# Patient Record
Sex: FEMALE | Race: WHITE | Employment: OTHER | URBAN - METROPOLITAN AREA
[De-identification: names, ages, dates, MRNs, and addresses within clinical notes are randomized per-mention and may not be internally consistent; named-entity substitution may affect disease eponyms.]

---

## 2017-03-30 ENCOUNTER — HOSPITAL ENCOUNTER (OUTPATIENT)
Dept: CT IMAGING | Age: 77
Discharge: HOME | End: 2017-03-30
Attending: INTERNAL MEDICINE
Payer: SELF-PAY

## 2017-03-30 DIAGNOSIS — Z13.6 ENCOUNTER FOR SCREENING FOR CARDIOVASCULAR DISORDERS: ICD-10-CM

## 2017-03-30 PROCEDURE — 75571 CT HRT W/O DYE W/CA TEST: CPT

## 2017-04-10 ENCOUNTER — TELEPHONE (OUTPATIENT)
Dept: CARDIAC REHAB | Age: 77
End: 2017-04-10

## 2017-04-10 NOTE — TELEPHONE ENCOUNTER
Called patient to review her CT scan results. Patient was unavailable. Left a voice mail message for patient to return my call. Return phone number given in message. Awaiting her return call.

## 2017-04-18 ENCOUNTER — TELEPHONE (OUTPATIENT)
Dept: CARDIAC REHAB | Age: 77
End: 2017-04-18

## 2017-06-26 ENCOUNTER — APPOINTMENT (OUTPATIENT)
Dept: GENERAL RADIOLOGY | Age: 77
End: 2017-06-26
Attending: EMERGENCY MEDICINE
Payer: MEDICARE

## 2017-06-26 ENCOUNTER — HOSPITAL ENCOUNTER (EMERGENCY)
Age: 77
Discharge: HOME | End: 2017-06-26
Attending: EMERGENCY MEDICINE
Payer: MEDICARE

## 2017-06-26 VITALS
OXYGEN SATURATION: 98 % | HEART RATE: 64 BPM | DIASTOLIC BLOOD PRESSURE: 78 MMHG | SYSTOLIC BLOOD PRESSURE: 141 MMHG | RESPIRATION RATE: 20 BRPM | HEIGHT: 61 IN | WEIGHT: 164 LBS | TEMPERATURE: 98.7 F | BODY MASS INDEX: 30.96 KG/M2

## 2017-06-26 DIAGNOSIS — R07.9 ACUTE CHEST PAIN: Primary | ICD-10-CM

## 2017-06-26 LAB
ALBUMIN SERPL BCP-MCNC: 3.6 G/DL (ref 3.4–5)
ALBUMIN/GLOB SERPL: 1.1 {RATIO} (ref 0.8–1.7)
ALP SERPL-CCNC: 81 U/L (ref 45–117)
ALT SERPL-CCNC: 21 U/L (ref 13–56)
ANION GAP BLD CALC-SCNC: 8 MMOL/L (ref 3–18)
APTT PPP: 35.3 SEC (ref 23–36.4)
AST SERPL W P-5'-P-CCNC: 18 U/L (ref 15–37)
BASOPHILS # BLD AUTO: 0 K/UL (ref 0–0.06)
BASOPHILS # BLD: 0 % (ref 0–2)
BILIRUB SERPL-MCNC: 0.4 MG/DL (ref 0.2–1)
BUN SERPL-MCNC: 15 MG/DL (ref 7–18)
BUN/CREAT SERPL: 13 (ref 12–20)
CALCIUM SERPL-MCNC: 8.7 MG/DL (ref 8.5–10.1)
CHLORIDE SERPL-SCNC: 103 MMOL/L (ref 100–108)
CK MB CFR SERPL CALC: NORMAL % (ref 0–4)
CK MB SERPL-MCNC: <1 NG/ML (ref 5–25)
CK SERPL-CCNC: 79 U/L (ref 26–192)
CO2 SERPL-SCNC: 26 MMOL/L (ref 21–32)
CREAT SERPL-MCNC: 1.13 MG/DL (ref 0.6–1.3)
D DIMER PPP FEU-MCNC: 0.57 UG/ML(FEU)
DIFFERENTIAL METHOD BLD: ABNORMAL
EOSINOPHIL # BLD: 0.1 K/UL (ref 0–0.4)
EOSINOPHIL NFR BLD: 2 % (ref 0–5)
ERYTHROCYTE [DISTWIDTH] IN BLOOD BY AUTOMATED COUNT: 14.5 % (ref 11.6–14.5)
GLOBULIN SER CALC-MCNC: 3.2 G/DL (ref 2–4)
GLUCOSE SERPL-MCNC: 179 MG/DL (ref 74–99)
HCT VFR BLD AUTO: 35.8 % (ref 35–45)
HGB BLD-MCNC: 11.4 G/DL (ref 12–16)
INR PPP: 1.1 (ref 0.8–1.2)
LYMPHOCYTES # BLD AUTO: 28 % (ref 21–52)
LYMPHOCYTES # BLD: 1.2 K/UL (ref 0.9–3.6)
MCH RBC QN AUTO: 29.3 PG (ref 24–34)
MCHC RBC AUTO-ENTMCNC: 31.8 G/DL (ref 31–37)
MCV RBC AUTO: 92 FL (ref 74–97)
MONOCYTES # BLD: 0.5 K/UL (ref 0.05–1.2)
MONOCYTES NFR BLD AUTO: 11 % (ref 3–10)
NEUTS SEG # BLD: 2.5 K/UL (ref 1.8–8)
NEUTS SEG NFR BLD AUTO: 59 % (ref 40–73)
PLATELET # BLD AUTO: 158 K/UL (ref 135–420)
PMV BLD AUTO: 9.8 FL (ref 9.2–11.8)
POTASSIUM SERPL-SCNC: 4.3 MMOL/L (ref 3.5–5.5)
PROT SERPL-MCNC: 6.8 G/DL (ref 6.4–8.2)
PROTHROMBIN TIME: 13.7 SEC (ref 11.5–15.2)
RBC # BLD AUTO: 3.89 M/UL (ref 4.2–5.3)
SODIUM SERPL-SCNC: 137 MMOL/L (ref 136–145)
TROPONIN I BLD-MCNC: <0.04 NG/ML (ref 0–0.08)
TROPONIN I SERPL-MCNC: <0.02 NG/ML (ref 0–0.04)
WBC # BLD AUTO: 4.3 K/UL (ref 4.6–13.2)

## 2017-06-26 PROCEDURE — 85379 FIBRIN DEGRADATION QUANT: CPT | Performed by: EMERGENCY MEDICINE

## 2017-06-26 PROCEDURE — 84484 ASSAY OF TROPONIN QUANT: CPT | Performed by: EMERGENCY MEDICINE

## 2017-06-26 PROCEDURE — 93005 ELECTROCARDIOGRAM TRACING: CPT

## 2017-06-26 PROCEDURE — 74011250637 HC RX REV CODE- 250/637: Performed by: EMERGENCY MEDICINE

## 2017-06-26 PROCEDURE — 82550 ASSAY OF CK (CPK): CPT | Performed by: EMERGENCY MEDICINE

## 2017-06-26 PROCEDURE — 80053 COMPREHEN METABOLIC PANEL: CPT | Performed by: EMERGENCY MEDICINE

## 2017-06-26 PROCEDURE — 85610 PROTHROMBIN TIME: CPT | Performed by: EMERGENCY MEDICINE

## 2017-06-26 PROCEDURE — 71020 XR CHEST PA LAT: CPT

## 2017-06-26 PROCEDURE — 85730 THROMBOPLASTIN TIME PARTIAL: CPT | Performed by: EMERGENCY MEDICINE

## 2017-06-26 PROCEDURE — 85025 COMPLETE CBC W/AUTO DIFF WBC: CPT | Performed by: EMERGENCY MEDICINE

## 2017-06-26 PROCEDURE — 99285 EMERGENCY DEPT VISIT HI MDM: CPT

## 2017-06-26 RX ORDER — ACETAMINOPHEN 500 MG
1000 TABLET ORAL
Qty: 40 TAB | Refills: 0 | Status: SHIPPED | OUTPATIENT
Start: 2017-06-26

## 2017-06-26 RX ORDER — ACETAMINOPHEN 500 MG
1000 TABLET ORAL
Status: COMPLETED | OUTPATIENT
Start: 2017-06-26 | End: 2017-06-26

## 2017-06-26 RX ORDER — IBUPROFEN 400 MG/1
400 TABLET ORAL
Qty: 20 TAB | Refills: 0 | Status: SHIPPED | OUTPATIENT
Start: 2017-06-26 | End: 2020-10-12

## 2017-06-26 RX ORDER — IBUPROFEN 400 MG/1
400 TABLET ORAL
Status: COMPLETED | OUTPATIENT
Start: 2017-06-26 | End: 2017-06-26

## 2017-06-26 RX ADMIN — IBUPROFEN 400 MG: 400 TABLET ORAL at 20:32

## 2017-06-26 RX ADMIN — PHENOBARBITAL 35 ML: 16.2; .1037; .0065; .0194 ELIXIR ORAL at 20:32

## 2017-06-26 RX ADMIN — ACETAMINOPHEN 1000 MG: 500 TABLET ORAL at 20:32

## 2017-06-26 NOTE — ED PROVIDER NOTES
HPI Comments: Eryn Pool is a 78 y.o. Female with c/o substernal pressure that woke her up about 2am, then was able to go to sleep, and woke back up about 9am with cont pain. nonradiating with no sob, vomiting, sob, sweats. Now has come and gone throughout the day, better when she lays on right side, worse with left side. Nonexertional with no recent hwang, exertional pain, fcs, edema, abd pain. Remote h/o stress in 2008. H/o htn, dm, chol, smoking in the past. No active cancer, recent immobilization, h/o vte. Took aspirin earlier without relief      The history is provided by the patient. Past Medical History:   Diagnosis Date    Breast cancer ([facility] [address] 2002    Stage 1, right    Depression     Diabetes ([facility] [address]     non insulin dependent    Hypercholesterolemia     Hypertension     Hypothyroidism     Lyme disease        Past Surgical History:   Procedure Laterality Date    HX APPENDECTOMY      HX BREAST LUMPECTOMY  2002    RIGHT BREAST    HX HYSTERECTOMY  2008    PRECANCEROUS CELLS ON RIGHT OVARY    HX TONSILLECTOMY  AGE 15         Family History:   Problem Relation Age of Onset    Heart Disease Mother     Heart Disease Father     Hypertension Sister     Heart Disease Brother        Social History     Social History    Marital status: SINGLE     Spouse name: N/A    Number of children: N/A    Years of education: N/A     Occupational History    Not on file. Social History Main Topics    Smoking status: Never Smoker    Smokeless tobacco: Never Used    Alcohol use No    Drug use: No    Sexual activity: Not on file     Other Topics Concern    Not on file     Social History Narrative         ALLERGIES: Codeine and Pcn [penicillins]    Review of Systems   Constitutional: Negative for fever. HENT: Negative for sore throat. Eyes: Negative for visual disturbance. Respiratory: Negative for cough and shortness of breath. Cardiovascular: Positive for chest pain.  Negative for leg swelling. Gastrointestinal: Negative for abdominal pain and blood in stool. Endocrine: Negative for polyuria. Genitourinary: Negative for difficulty urinating. Musculoskeletal: Negative for gait problem. Skin: Negative for rash. Neurological: Negative for speech difficulty. Hematological: Does not bruise/bleed easily. Psychiatric/Behavioral: Positive for sleep disturbance. Vitals:    06/26/17 1744 06/26/17 2042   BP: 151/82 141/78   Pulse: 75 64   Resp: 14 20   Temp: 98.7 °F (37.1 °C)    SpO2: 99% 98%   Weight: 74.4 kg (164 lb)    Height: 5' 1\" (1.549 m)             Physical Exam   Constitutional: She is oriented to person, place, and time. She appears well-developed and well-nourished. No distress. HENT:   Head: Normocephalic and atraumatic. Right Ear: External ear normal.   Left Ear: External ear normal.   Nose: Nose normal.   Mouth/Throat: Uvula is midline, oropharynx is clear and moist and mucous membranes are normal.   Eyes: Conjunctivae are normal. No scleral icterus. Neck: Neck supple. Cardiovascular: Normal rate, regular rhythm, normal heart sounds and intact distal pulses. Pulmonary/Chest: Effort normal and breath sounds normal. She exhibits tenderness and bony tenderness. She exhibits no edema, no deformity and no swelling. Abdominal: Soft. There is no tenderness. Musculoskeletal: She exhibits no edema. Neurological: She is alert and oriented to person, place, and time. Gait normal.   Skin: Skin is warm and dry. She is not diaphoretic. Psychiatric: Her behavior is normal.   Nursing note and vitals reviewed.        [facility]  ED Course       Procedures    Vitals:  Patient Vitals for the past 12 hrs:   Temp Pulse Resp BP SpO2   06/26/17 2042 - 64 20 141/78 98 %   06/26/17 1744 98.7 °F (37.1 °C) 75 14 151/82 99 %         Medications ordered:   Medications   mylanta/donnatal(GI COCKTAIL) (35 mL Oral Given 6/26/17 2032)   acetaminophen (TYLENOL) tablet 1,000 mg (1,000 mg Oral Given 6/26/17 2032)   ibuprofen (MOTRIN) tablet 400 mg (400 mg Oral Given 6/26/17 2032)         Lab findings:  Recent Results (from the past 12 hour(s))   EKG, 12 LEAD, INITIAL    Collection Time: 06/26/17  5:26 PM   Result Value Ref Range    Ventricular Rate 75 BPM    Atrial Rate 75 BPM    P-R Interval 194 ms    QRS Duration 74 ms    Q-T Interval 412 ms    QTC Calculation (Bezet) 460 ms    Calculated P Axis 21 degrees    Calculated R Axis 21 degrees    Calculated T Axis 53 degrees    Diagnosis       Normal sinus rhythm  Normal ECG  When compared with ECG of 14-MAY-2015 12:41,  No significant change was found     CBC WITH AUTOMATED DIFF    Collection Time: 06/26/17  5:35 PM   Result Value Ref Range    WBC 4.3 (L) 4.6 - 13.2 K/uL    RBC 3.89 (L) 4.20 - 5.30 M/uL    HGB 11.4 (L) 12.0 - 16.0 g/dL    HCT 35.8 35.0 - 45.0 %    MCV 92.0 74.0 - 97.0 FL    MCH 29.3 24.0 - 34.0 PG    MCHC 31.8 31.0 - 37.0 g/dL    RDW 14.5 11.6 - 14.5 %    PLATELET 658 652 - 508 K/uL    MPV 9.8 9.2 - 11.8 FL    NEUTROPHILS 59 40 - 73 %    LYMPHOCYTES 28 21 - 52 %    MONOCYTES 11 (H) 3 - 10 %    EOSINOPHILS 2 0 - 5 %    BASOPHILS 0 0 - 2 %    ABS. NEUTROPHILS 2.5 1.8 - 8.0 K/UL    ABS. LYMPHOCYTES 1.2 0.9 - 3.6 K/UL    ABS. MONOCYTES 0.5 0.05 - 1.2 K/UL    ABS. EOSINOPHILS 0.1 0.0 - 0.4 K/UL    ABS. BASOPHILS 0.0 0.0 - 0.06 K/UL    DF AUTOMATED     METABOLIC PANEL, COMPREHENSIVE    Collection Time: 06/26/17  5:35 PM   Result Value Ref Range    Sodium 137 136 - 145 mmol/L    Potassium 4.3 3.5 - 5.5 mmol/L    Chloride 103 100 - 108 mmol/L    CO2 26 21 - 32 mmol/L    Anion gap 8 3.0 - 18 mmol/L    Glucose 179 (H) 74 - 99 mg/dL    BUN 15 7.0 - 18 MG/DL    Creatinine 1.13 0.6 - 1.3 MG/DL    BUN/Creatinine ratio 13 12 - 20      GFR est AA 57 (L) >60 ml/min/1.73m2    GFR est non-AA 47 (L) >60 ml/min/1.73m2    Calcium 8.7 8.5 - 10.1 MG/DL    Bilirubin, total 0.4 0.2 - 1.0 MG/DL    ALT (SGPT) 21 13 - 56 U/L    AST (SGOT) 18 15 - 37 U/L    Alk. phosphatase 81 45 - 117 U/L    Protein, total 6.8 6.4 - 8.2 g/dL    Albumin 3.6 3.4 - 5.0 g/dL    Globulin 3.2 2.0 - 4.0 g/dL    A-G Ratio 1.1 0.8 - 1.7     PTT    Collection Time: 06/26/17  5:35 PM   Result Value Ref Range    aPTT 35.3 23.0 - 36.4 SEC   PROTHROMBIN TIME + INR    Collection Time: 06/26/17  5:35 PM   Result Value Ref Range    Prothrombin time 13.7 11.5 - 15.2 sec    INR 1.1 0.8 - 1.2     CARDIAC PANEL,(CK, CKMB & TROPONIN)    Collection Time: 06/26/17  5:35 PM   Result Value Ref Range    CK 79 26 - 192 U/L    CK - MB <1.0 <3.6 ng/ml    CK-MB Index Cannot be calulated 0.0 - 4.0 %    Troponin-I, Qt. <0.02 0.0 - 0.045 NG/ML   D DIMER    Collection Time: 06/26/17  5:35 PM   Result Value Ref Range    D DIMER 0.57 (H) <0.46 ug/ml(FEU)   EKG, 12 LEAD, SUBSEQUENT    Collection Time: 06/26/17  8:27 PM   Result Value Ref Range    Ventricular Rate 64 BPM    Atrial Rate 64 BPM    P-R Interval 192 ms    QRS Duration 78 ms    Q-T Interval 442 ms    QTC Calculation (Bezet) 455 ms    Calculated P Axis 49 degrees    Calculated R Axis 26 degrees    Calculated T Axis 52 degrees    Diagnosis       Normal sinus rhythm  Normal ECG  When compared with ECG of 26-JUN-2017 17:26,  No significant change was found         EKG interpretation by ED Physician:  raet 75, qtc 460  nsr with no acute st tw changes  No change from previous    Repeat rate 64, qtc 455  nsr with no acute st tw changes  Nl ecg. No changes    X-Ray, CT or other radiology findings or impressions:  XR CHEST PA LAT    (Results Pending)     Nl appearance to me  Progress notes, Consult notes or additional Procedure notes:   Have applied age adjusted dimer rule and pt is very low risk for vte per my assessment  Repeat trop 0.00. Doubt cardiac related, pe, other vascular issue.  Suspect more muscular related  I have discussed with patient and/or family/sig other the results, interpretation of any imaging if performed, suspected diagnosis and treatment plan to include instructions regarding the diagnoses listed to which understanding was expressed with all questions answered    Reevaluation of patient:   Stable for dc    Disposition:  Diagnosis:   1. Acute chest pain        Disposition: home      Follow-up Information     Follow up With Details Comments Contact Kenzie Pedroza MD Schedule an appointment as soon as possible for a visit  [facility] [address] EMERGENCY DEPT  If symptoms worsen [address] [facility] [address].  [phone]            Patient's Medications   Start Taking    ACETAMINOPHEN (TYLENOL EXTRA STRENGTH) 500 MG TABLET    Take 2 Tabs by mouth every six (6) hours as needed for Pain. IBUPROFEN (MOTRIN) 400 MG TABLET    Take 1 Tab by mouth every six (6) hours as needed for Pain. Continue Taking    AMLODIPINE (NORVASC) 2.5 MG TABLET    Take  by mouth daily. ASCORBIC ACID (VITAMIN C) 250 MG TABLET    Take  by mouth. ASPIRIN 81 MG CHEWABLE TABLET    Take 81 mg by mouth daily. ATENOLOL (TENORMIN) 25 MG TABLET    Take 25 mg by mouth daily. CALCIUM CARBONATE-VIT D3- MG CALCIUM- 400 UNIT TAB    Take  by mouth. CHOLECALCIFEROL, VITAMIN D3, (VITAMIN D3) 1,000 UNIT CAP    Take  by mouth. CRANBERRY EXTRACT 450 MG TAB    Take  by mouth. CYANOCOBALAMIN (VITAMIN B12) 500 MCG TABLET    Take 500 mcg by mouth daily. GABAPENTIN (NEURONTIN) 300 MG TABLET    Take  by mouth two (2) times a day. LEVOTHYROXINE (SYNTHROID) 50 MCG TABLET    Take  by mouth Daily (before breakfast). METFORMIN (GLUCOPHAGE) 500 MG TABLET    Take  by mouth daily. MULTIVITAMIN (ONE A DAY) TABLET    Take 1 Tab by mouth daily. OLANZAPINE (ZYPREXA) 7.5 MG TABLET    Take 7.5 mg by mouth nightly. OMEGA-3 FATTY ACIDS-VITAMIN E 1,000 MG CAP    Take 1 Cap by mouth. OMEPRAZOLE (PRILOSEC) 40 MG CAPSULE    Take 40 mg by mouth daily.       OXYBUTYNIN (DITROPAN) 5 MG TABLET    Take 5 mg by mouth three (3) times daily. SIMVASTATIN (ZOCOR) 5 MG TABLET    Take  by mouth nightly. SITAGLIPTIN-METFORMIN (JANUMET)  MG PER TABLET    Take 1 Tab by mouth two (2) times daily (with meals). VENLAFAXINE-SR (EFFEXOR-XR) 150 MG CAPSULE    Take  by mouth two (2) times a day. These Medications have changed    No medications on file   Stop Taking    METHOCARBAMOL (ROBAXIN) 500 MG TABLET    Take 1 Tab by mouth three (3) times daily.

## 2017-06-27 LAB
ATRIAL RATE: 64 BPM
ATRIAL RATE: 75 BPM
CALCULATED P AXIS, ECG09: 21 DEGREES
CALCULATED P AXIS, ECG09: 49 DEGREES
CALCULATED R AXIS, ECG10: 21 DEGREES
CALCULATED R AXIS, ECG10: 26 DEGREES
CALCULATED T AXIS, ECG11: 52 DEGREES
CALCULATED T AXIS, ECG11: 53 DEGREES
DIAGNOSIS, 93000: NORMAL
DIAGNOSIS, 93000: NORMAL
P-R INTERVAL, ECG05: 192 MS
P-R INTERVAL, ECG05: 194 MS
Q-T INTERVAL, ECG07: 412 MS
Q-T INTERVAL, ECG07: 442 MS
QRS DURATION, ECG06: 74 MS
QRS DURATION, ECG06: 78 MS
QTC CALCULATION (BEZET), ECG08: 455 MS
QTC CALCULATION (BEZET), ECG08: 460 MS
VENTRICULAR RATE, ECG03: 64 BPM
VENTRICULAR RATE, ECG03: 75 BPM

## 2018-01-24 ENCOUNTER — HOSPITAL ENCOUNTER (OUTPATIENT)
Dept: MRI IMAGING | Age: 78
Discharge: HOME | End: 2018-01-24
Attending: INTERNAL MEDICINE
Payer: MEDICARE

## 2018-01-24 DIAGNOSIS — M54.50 LUMBAGO: ICD-10-CM

## 2018-01-24 PROCEDURE — 72148 MRI LUMBAR SPINE W/O DYE: CPT

## 2018-08-07 ENCOUNTER — HOSPITAL ENCOUNTER (OUTPATIENT)
Dept: GENERAL RADIOLOGY | Age: 78
Discharge: HOME | End: 2018-08-07
Payer: MEDICARE

## 2018-08-07 DIAGNOSIS — M19.90 OSTEOARTHRITIS: ICD-10-CM

## 2018-08-07 PROCEDURE — 73130 X-RAY EXAM OF HAND: CPT

## 2018-08-29 ENCOUNTER — HOSPITAL ENCOUNTER (OUTPATIENT)
Dept: GENERAL RADIOLOGY | Age: 78
Discharge: HOME | End: 2018-08-29
Attending: INTERNAL MEDICINE
Payer: MEDICARE

## 2018-08-29 DIAGNOSIS — Z78.0 POSTMENOPAUSAL: ICD-10-CM

## 2018-08-29 PROCEDURE — 77080 DXA BONE DENSITY AXIAL: CPT

## 2019-04-22 ENCOUNTER — HOSPITAL ENCOUNTER (OUTPATIENT)
Dept: MRI IMAGING | Age: 79
Discharge: HOME | End: 2019-04-22
Attending: INTERNAL MEDICINE
Payer: MEDICARE

## 2019-04-22 DIAGNOSIS — R15.9 FECAL INCONTINENCE: ICD-10-CM

## 2019-04-22 PROCEDURE — 72148 MRI LUMBAR SPINE W/O DYE: CPT

## 2020-10-05 ENCOUNTER — VIRTUAL VISIT (OUTPATIENT)
Dept: FAMILY MEDICINE CLINIC | Age: 80
End: 2020-10-05
Payer: MEDICARE

## 2020-10-05 DIAGNOSIS — E03.9 ACQUIRED HYPOTHYROIDISM: ICD-10-CM

## 2020-10-05 DIAGNOSIS — F32.A ANXIETY AND DEPRESSION: ICD-10-CM

## 2020-10-05 DIAGNOSIS — E11.9 TYPE 2 DIABETES MELLITUS WITHOUT COMPLICATION, WITHOUT LONG-TERM CURRENT USE OF INSULIN (HCC): ICD-10-CM

## 2020-10-05 DIAGNOSIS — I10 ESSENTIAL HYPERTENSION: ICD-10-CM

## 2020-10-05 DIAGNOSIS — E78.00 HYPERCHOLESTEROLEMIA: ICD-10-CM

## 2020-10-05 DIAGNOSIS — R22.9 LUMP OF SKIN: Primary | ICD-10-CM

## 2020-10-05 DIAGNOSIS — F41.9 ANXIETY AND DEPRESSION: ICD-10-CM

## 2020-10-05 PROCEDURE — G8427 DOCREV CUR MEDS BY ELIG CLIN: HCPCS | Performed by: INTERNAL MEDICINE

## 2020-10-05 PROCEDURE — 1101F PT FALLS ASSESS-DOCD LE1/YR: CPT | Performed by: INTERNAL MEDICINE

## 2020-10-05 PROCEDURE — G8421 BMI NOT CALCULATED: HCPCS | Performed by: INTERNAL MEDICINE

## 2020-10-05 PROCEDURE — 1090F PRES/ABSN URINE INCON ASSESS: CPT | Performed by: INTERNAL MEDICINE

## 2020-10-05 PROCEDURE — 99203 OFFICE O/P NEW LOW 30 MIN: CPT | Performed by: INTERNAL MEDICINE

## 2020-10-05 PROCEDURE — G8399 PT W/DXA RESULTS DOCUMENT: HCPCS | Performed by: INTERNAL MEDICINE

## 2020-10-05 PROCEDURE — G8536 NO DOC ELDER MAL SCRN: HCPCS | Performed by: INTERNAL MEDICINE

## 2020-10-05 PROCEDURE — G8432 DEP SCR NOT DOC, RNG: HCPCS | Performed by: INTERNAL MEDICINE

## 2020-10-05 PROCEDURE — G8756 NO BP MEASURE DOC: HCPCS | Performed by: INTERNAL MEDICINE

## 2020-10-05 RX ORDER — OSPEMIFENE 60 MG/1
TABLET, FILM COATED ORAL
COMMUNITY
Start: 2020-09-21

## 2020-10-05 RX ORDER — LOSARTAN POTASSIUM 100 MG/1
TABLET ORAL
COMMUNITY
Start: 2020-08-25 | End: 2021-01-22 | Stop reason: SDUPTHER

## 2020-10-05 RX ORDER — SIMVASTATIN 10 MG/1
TABLET, FILM COATED ORAL
COMMUNITY
Start: 2020-08-25 | End: 2021-01-22 | Stop reason: SDUPTHER

## 2020-10-05 RX ORDER — METHYLPHENIDATE HYDROCHLORIDE 10 MG/1
10 TABLET ORAL
COMMUNITY
End: 2021-05-03 | Stop reason: DRUGHIGH

## 2020-10-05 RX ORDER — OLANZAPINE 5 MG/1
TABLET ORAL
COMMUNITY
Start: 2020-09-23

## 2020-10-05 RX ORDER — CALCIUM CARBONATE/VITAMIN D3 600MG-5MCG
2 TABLET ORAL 2 TIMES DAILY
COMMUNITY

## 2020-10-05 RX ORDER — CALCIUM CITRATE/VITAMIN D3 200MG-6.25
TABLET ORAL
COMMUNITY
Start: 2020-09-16 | End: 2022-04-19 | Stop reason: SDUPTHER

## 2020-10-05 RX ORDER — UREA 10 %
0.8 LOTION (ML) TOPICAL DAILY
COMMUNITY

## 2020-10-05 RX ORDER — GLUCOSAM/CHON-MSM1/C/MANG/BOSW 500-416.6
TABLET ORAL
COMMUNITY
Start: 2020-09-16 | End: 2022-07-27 | Stop reason: SDUPTHER

## 2020-10-05 RX ORDER — ALENDRONATE SODIUM 70 MG/1
TABLET ORAL
COMMUNITY
Start: 2020-06-30 | End: 2021-05-03 | Stop reason: SDUPTHER

## 2020-10-05 RX ORDER — METFORMIN HYDROCHLORIDE 500 MG/1
500 TABLET ORAL 2 TIMES DAILY WITH MEALS
COMMUNITY
End: 2021-06-16 | Stop reason: SDUPTHER

## 2020-10-05 RX ORDER — ASCORBIC ACID 500 MG
500 TABLET ORAL DAILY
COMMUNITY

## 2020-10-05 RX ORDER — CLONAZEPAM 0.5 MG/1
TABLET ORAL
COMMUNITY
Start: 2020-09-17

## 2020-10-05 RX ORDER — PANTOPRAZOLE SODIUM 40 MG/1
40 TABLET, DELAYED RELEASE ORAL DAILY
COMMUNITY
End: 2021-05-07 | Stop reason: SDUPTHER

## 2020-10-05 RX ORDER — SITAGLIPTIN AND METFORMIN HYDROCHLORIDE 500; 50 MG/1; MG/1
50-500 TABLET, FILM COATED ORAL DAILY
COMMUNITY
Start: 2018-10-21 | End: 2021-05-17 | Stop reason: SDUPTHER

## 2020-10-05 RX ORDER — OXYBUTYNIN CHLORIDE 10 MG/1
TABLET, EXTENDED RELEASE ORAL
COMMUNITY
Start: 2020-09-22

## 2020-10-05 RX ORDER — ATENOLOL 50 MG/1
TABLET ORAL
COMMUNITY
Start: 2020-09-15 | End: 2021-03-26 | Stop reason: SDUPTHER

## 2020-10-05 NOTE — PROGRESS NOTES
Ramón Collazo is a 70 y.o. female who was seen by synchronous (real-time) audio-video technology using doxy. me on 10/5/2020. Location of the patient: Home    Location of the provider: [facility]    Consent:  She and/or health care decision maker is aware that that she may receive a bill for this telehealth service, depending on her insurance coverage, and has provided verbal consent to proceed: Yes    Subjective:   Ramón Collazo is a 70 y.o. female who presents today for management of    Chief Complaint   Patient presents with    Establish Care    Hypertension    Cholesterol Problem    Diabetes       Patient is here to establish care. Previous PCP: Bill Sickle    Patient complains of a bulge on her abdomen. Onset was 2 weeks ago. The lump is about 1 inch in diameter. No associated pain or skin changes. Diabetes Mellitus:  The patient has diabetes, hypertension and hyperlipidemia. Diabetic ROS - medication compliance: compliant all of the time, diabetic diet compliance: compliant most of the time, further diabetic ROS: no polyuria or polydipsia, no chest pain, dyspnea or TIA's, no numbness, tingling or pain in extremities. Lab review: orders written for new lab studies as appropriate; see orders. Cardiovascular Review:  Diet and Lifestyle: generally follows a low fat low cholesterol diet, generally follows a low sodium diet, sedentary, nonsmoker  Home BP Monitoring: is not measured at home. Pertinent ROS: taking medications as instructed, no medication side effects noted, no TIA's, no chest pain on exertion, no dyspnea on exertion, no swelling of ankles.      Past Medical History  Past Medical History:   Diagnosis Date    Breast cancer ([facility] [address] 2002    Stage 1, right    Depression     Diabetes ([facility] [address]     non insulin dependent    Hypercholesterolemia     Hypertension     Hypothyroidism     Lyme disease         Surgical History  Past Surgical History:   Procedure Laterality Date    HX APPENDECTOMY      HX BREAST LUMPECTOMY  2002    RIGHT BREAST    HX HYSTERECTOMY  2008    PRECANCEROUS CELLS ON RIGHT OVARY    HX TONSILLECTOMY  AGE 15        Current Medications  Current Outpatient Medications   Medication Sig    pantoprazole (PROTONIX) 40 mg tablet Take 40 mg by mouth daily.  methylphenidate HCl (Ritalin) 10 mg tablet Take 10 mg by mouth.  SITagliptin-metFORMIN (Janumet)  mg per tablet  mg daily.  venlafaxine-SR (EFFEXOR-XR) 150 mg capsule Take  by mouth two (2) times a day.  levothyroxine (SYNTHROID) 50 mcg tablet Take  by mouth Daily (before breakfast).  atenoloL (TENORMIN) 50 mg tablet     simvastatin (ZOCOR) 10 mg tablet     ascorbic acid, vitamin C, (VITAMIN C) 500 mg tablet Take 500 mg by mouth daily.  oxybutynin chloride XL (DITROPAN XL) 10 mg CR tablet     alendronate (FOSAMAX) 70 mg tablet     True Metrix Glucose Test Strip strip     Osphena 60 mg tab tablet TAKE ONE TABLET BY MOUTH EVERY DAY WITH FOOD    metFORMIN (GLUCOPHAGE) 500 mg tablet Take 500 mg by mouth two (2) times daily (with meals).  calcium-vitamin D 600 mg(1,500mg) -200 unit tab Take 2 Tabs by mouth two (2) times a day.  TRUEplus Lancets 28 gauge misc     losartan (COZAAR) 100 mg tablet     OLANZapine (ZyPREXA) 5 mg tablet     folic acid (FOLVITE) 185 mcg tablet Take 0.8 mg by mouth daily.  clonazePAM (KlonoPIN) 0.5 mg tablet     ibuprofen (MOTRIN) 400 mg tablet Take 1 Tab by mouth every six (6) hours as needed for Pain.  acetaminophen (TYLENOL EXTRA STRENGTH) 500 mg tablet Take 2 Tabs by mouth every six (6) hours as needed for Pain.  omega-3 fatty acids-vitamin e 1,000 mg cap Take 1 Cap by mouth.  amLODIPine (NORVASC) 2.5 mg tablet Take  by mouth daily.  multivitamin (ONE A DAY) tablet Take 1 Tab by mouth daily.  gabapentin (NEURONTIN) 300 mg tablet Take  by mouth two (2) times a day.       aspirin 81 mg chewable tablet Take 81 mg by mouth daily.      cranberry extract 450 mg Tab Take  by mouth.  cyanocobalamin (VITAMIN B12) 500 mcg tablet Take 500 mcg by mouth daily. No current facility-administered medications for this visit. Allergies/Drug Reactions  Allergies   Allergen Reactions    Codeine Itching    Pcn [Penicillins] Hives and Swelling    Meperidine Itching        Social History  Social History     Tobacco Use    Smoking status: Never Smoker    Smokeless tobacco: Never Used   Substance Use Topics    Alcohol use: No    Drug use: No        Review of Systems  Review of Systems   Constitutional: Negative for chills, fever, malaise/fatigue and weight loss. Respiratory: Negative for cough, shortness of breath and wheezing. Cardiovascular: Negative for chest pain, palpitations and leg swelling. Gastrointestinal: Negative. Genitourinary: Negative. Musculoskeletal: Negative. Neurological: Negative for dizziness and headaches. Psychiatric/Behavioral: Negative for depression and memory loss. The patient is not nervous/anxious and does not have insomnia. Objective:     General: alert, cooperative, no distress   Mental  status: mental status: alert, oriented to person, place, and time, normal mood, behavior, speech, dress, motor activity, and thought processes   Resp: resp: normal effort and no respiratory distress   Neuro: neuro: no gross deficits   Skin: skin: no discoloration or lesions of concern on visible areas     Due to this being a TeleHealth evaluation, many elements of the physical examination are unable to be assessed. Assessment & Plan:   1. Lump of skin  - scheduled for a face-to-face visit next week    2. Type 2 diabetes mellitus without complication, without long-term current use of insulin ([address]  - control uncertain  - CBC WITH AUTOMATED DIFF; Future  - HEMOGLOBIN A1C WITH EAG; Future  - MICROALBUMIN, UR, RAND W/ MICROALB/CREAT RATIO; Future    3.  Essential hypertension  - control uncertain  - METABOLIC PANEL, COMPREHENSIVE; Future  - URINALYSIS W/ RFLX MICROSCOPIC; Future    4. Hypercholesterolemia  - control uncertain  - LIPID PANEL; Future  - METABOLIC PANEL, COMPREHENSIVE; Future    5. Acquired hypothyroidism  - TSH 3RD GENERATION; Future  - T4, FREE; Future    6. Anxiety and depression  - defer to psychiatry      Follow-up and Dispositions    · Return in about 1 week (around 10/12/2020). We discussed the expected course, resolution and complications of the diagnosis(es) in detail. Medication risks, benefits, costs, interactions, and alternatives were discussed as indicated. I advised her to contact the office if her condition worsens, changes or fails to improve as anticipated. She expressed understanding with the diagnosis(es) and plan. Pursuant to the emergency declaration under the [facility], [facility] waiver authority and the [employer] Act, this Virtual  Visit was conducted, with patient's consent, to reduce the patient's risk of exposure to COVID-19 and provide continuity of care for an established patient. Services were provided through a video synchronous discussion virtually to substitute for in-person clinic visit.     Blair Cm MD

## 2020-10-12 ENCOUNTER — HOSPITAL ENCOUNTER (OUTPATIENT)
Dept: LAB | Age: 80
Discharge: HOME | End: 2020-10-12
Payer: MEDICARE

## 2020-10-12 ENCOUNTER — OFFICE VISIT (OUTPATIENT)
Dept: FAMILY MEDICINE CLINIC | Age: 80
End: 2020-10-12
Payer: MEDICARE

## 2020-10-12 VITALS
SYSTOLIC BLOOD PRESSURE: 134 MMHG | DIASTOLIC BLOOD PRESSURE: 69 MMHG | HEART RATE: 72 BPM | OXYGEN SATURATION: 99 % | RESPIRATION RATE: 15 BRPM | WEIGHT: 160 LBS | TEMPERATURE: 97.7 F | BODY MASS INDEX: 30.21 KG/M2 | HEIGHT: 61 IN

## 2020-10-12 DIAGNOSIS — R19.00 MASS OF SOFT TISSUE OF ABDOMEN: ICD-10-CM

## 2020-10-12 DIAGNOSIS — M81.0 AGE-RELATED OSTEOPOROSIS WITHOUT CURRENT PATHOLOGICAL FRACTURE: ICD-10-CM

## 2020-10-12 DIAGNOSIS — Z13.39 SCREENING FOR ALCOHOLISM: ICD-10-CM

## 2020-10-12 DIAGNOSIS — Z78.0 POSTMENOPAUSAL: ICD-10-CM

## 2020-10-12 DIAGNOSIS — F41.9 ANXIETY AND DEPRESSION: ICD-10-CM

## 2020-10-12 DIAGNOSIS — F32.A ANXIETY AND DEPRESSION: ICD-10-CM

## 2020-10-12 DIAGNOSIS — M54.41 CHRONIC RIGHT-SIDED LOW BACK PAIN WITH RIGHT-SIDED SCIATICA: ICD-10-CM

## 2020-10-12 DIAGNOSIS — K21.9 GASTROESOPHAGEAL REFLUX DISEASE, UNSPECIFIED WHETHER ESOPHAGITIS PRESENT: ICD-10-CM

## 2020-10-12 DIAGNOSIS — I10 ESSENTIAL HYPERTENSION: ICD-10-CM

## 2020-10-12 DIAGNOSIS — G89.29 CHRONIC RIGHT-SIDED LOW BACK PAIN WITH RIGHT-SIDED SCIATICA: ICD-10-CM

## 2020-10-12 DIAGNOSIS — Z74.09 IMPAIRED FUNCTIONAL MOBILITY, BALANCE, GAIT, AND ENDURANCE: ICD-10-CM

## 2020-10-12 DIAGNOSIS — E78.00 HYPERCHOLESTEROLEMIA: ICD-10-CM

## 2020-10-12 DIAGNOSIS — E03.9 ACQUIRED HYPOTHYROIDISM: ICD-10-CM

## 2020-10-12 DIAGNOSIS — E11.42 TYPE 2 DIABETES MELLITUS WITH DIABETIC POLYNEUROPATHY, WITHOUT LONG-TERM CURRENT USE OF INSULIN (HCC): Primary | ICD-10-CM

## 2020-10-12 DIAGNOSIS — E11.9 TYPE 2 DIABETES MELLITUS WITHOUT COMPLICATION, WITHOUT LONG-TERM CURRENT USE OF INSULIN (HCC): ICD-10-CM

## 2020-10-12 DIAGNOSIS — Z00.00 MEDICARE ANNUAL WELLNESS VISIT, SUBSEQUENT: ICD-10-CM

## 2020-10-12 LAB
25(OH)D3 SERPL-MCNC: 32.5 NG/ML (ref 30–100)
ALBUMIN SERPL-MCNC: 4.2 G/DL (ref 3.4–5)
ALBUMIN/GLOB SERPL: 1.4 {RATIO} (ref 0.8–1.7)
ALP SERPL-CCNC: 69 U/L (ref 45–117)
ALT SERPL-CCNC: 26 U/L (ref 13–56)
ANION GAP SERPL CALC-SCNC: 5 MMOL/L (ref 3–18)
APPEARANCE UR: CLEAR
AST SERPL-CCNC: 21 U/L (ref 10–38)
BACTERIA URNS QL MICRO: NEGATIVE /HPF
BASOPHILS # BLD: 0 K/UL (ref 0–0.1)
BASOPHILS NFR BLD: 0 % (ref 0–2)
BILIRUB SERPL-MCNC: 0.3 MG/DL (ref 0.2–1)
BILIRUB UR QL: NEGATIVE
BUN SERPL-MCNC: 9 MG/DL (ref 7–18)
BUN/CREAT SERPL: 10 (ref 12–20)
CALCIUM SERPL-MCNC: 9.2 MG/DL (ref 8.5–10.1)
CHLORIDE SERPL-SCNC: 98 MMOL/L (ref 100–111)
CHOLEST SERPL-MCNC: 139 MG/DL
CO2 SERPL-SCNC: 30 MMOL/L (ref 21–32)
COLOR UR: YELLOW
CREAT SERPL-MCNC: 0.86 MG/DL (ref 0.6–1.3)
DIFFERENTIAL METHOD BLD: ABNORMAL
EOSINOPHIL # BLD: 0 K/UL (ref 0–0.4)
EOSINOPHIL NFR BLD: 1 % (ref 0–5)
EPITH CASTS URNS QL MICRO: NORMAL /LPF (ref 0–5)
ERYTHROCYTE [DISTWIDTH] IN BLOOD BY AUTOMATED COUNT: 14.1 % (ref 11.6–14.5)
EST. AVERAGE GLUCOSE BLD GHB EST-MCNC: 134 MG/DL
GLOBULIN SER CALC-MCNC: 3 G/DL (ref 2–4)
GLUCOSE SERPL-MCNC: 99 MG/DL (ref 74–99)
GLUCOSE UR STRIP.AUTO-MCNC: NEGATIVE MG/DL
HBA1C MFR BLD: 6.3 % (ref 4.2–5.6)
HCT VFR BLD AUTO: 36.5 % (ref 35–45)
HDLC SERPL-MCNC: 63 MG/DL (ref 40–60)
HDLC SERPL: 2.2 {RATIO} (ref 0–5)
HGB BLD-MCNC: 11.8 G/DL (ref 12–16)
HGB UR QL STRIP: NEGATIVE
KETONES UR QL STRIP.AUTO: NEGATIVE MG/DL
LDLC SERPL CALC-MCNC: 44.6 MG/DL (ref 0–100)
LEUKOCYTE ESTERASE UR QL STRIP.AUTO: ABNORMAL
LIPID PROFILE,FLP: ABNORMAL
LYMPHOCYTES # BLD: 1.3 K/UL (ref 0.9–3.6)
LYMPHOCYTES NFR BLD: 27 % (ref 21–52)
MCH RBC QN AUTO: 29.3 PG (ref 24–34)
MCHC RBC AUTO-ENTMCNC: 32.3 G/DL (ref 31–37)
MCV RBC AUTO: 90.6 FL (ref 74–97)
MONOCYTES # BLD: 0.5 K/UL (ref 0.05–1.2)
MONOCYTES NFR BLD: 11 % (ref 3–10)
NEUTS SEG # BLD: 3 K/UL (ref 1.8–8)
NEUTS SEG NFR BLD: 61 % (ref 40–73)
NITRITE UR QL STRIP.AUTO: NEGATIVE
PH UR STRIP: 8.5 [PH] (ref 5–8)
PLATELET # BLD AUTO: 209 K/UL (ref 135–420)
PMV BLD AUTO: 10 FL (ref 9.2–11.8)
POTASSIUM SERPL-SCNC: 4.8 MMOL/L (ref 3.5–5.5)
PROT SERPL-MCNC: 7.2 G/DL (ref 6.4–8.2)
PROT UR STRIP-MCNC: NEGATIVE MG/DL
RBC # BLD AUTO: 4.03 M/UL (ref 4.2–5.3)
RBC #/AREA URNS HPF: NEGATIVE /HPF (ref 0–5)
SODIUM SERPL-SCNC: 133 MMOL/L (ref 136–145)
SP GR UR REFRACTOMETRY: 1 (ref 1–1.03)
T4 FREE SERPL-MCNC: 1 NG/DL (ref 0.7–1.5)
TRIGL SERPL-MCNC: 157 MG/DL (ref ?–150)
TSH SERPL DL<=0.05 MIU/L-ACNC: 1.76 UIU/ML (ref 0.36–3.74)
UROBILINOGEN UR QL STRIP.AUTO: 0.2 EU/DL (ref 0.2–1)
VLDLC SERPL CALC-MCNC: 31.4 MG/DL
WBC # BLD AUTO: 4.8 K/UL (ref 4.6–13.2)
WBC URNS QL MICRO: NORMAL /HPF (ref 0–4)

## 2020-10-12 PROCEDURE — 81001 URINALYSIS AUTO W/SCOPE: CPT

## 2020-10-12 PROCEDURE — G8752 SYS BP LESS 140: HCPCS | Performed by: INTERNAL MEDICINE

## 2020-10-12 PROCEDURE — 84443 ASSAY THYROID STIM HORMONE: CPT

## 2020-10-12 PROCEDURE — 36415 COLL VENOUS BLD VENIPUNCTURE: CPT

## 2020-10-12 PROCEDURE — 3288F FALL RISK ASSESSMENT DOCD: CPT | Performed by: INTERNAL MEDICINE

## 2020-10-12 PROCEDURE — 82306 VITAMIN D 25 HYDROXY: CPT

## 2020-10-12 PROCEDURE — 80053 COMPREHEN METABOLIC PANEL: CPT

## 2020-10-12 PROCEDURE — G0439 PPPS, SUBSEQ VISIT: HCPCS | Performed by: INTERNAL MEDICINE

## 2020-10-12 PROCEDURE — 85025 COMPLETE CBC W/AUTO DIFF WBC: CPT

## 2020-10-12 PROCEDURE — 1100F PTFALLS ASSESS-DOCD GE2>/YR: CPT | Performed by: INTERNAL MEDICINE

## 2020-10-12 PROCEDURE — 80061 LIPID PANEL: CPT

## 2020-10-12 PROCEDURE — 84439 ASSAY OF FREE THYROXINE: CPT

## 2020-10-12 PROCEDURE — 82043 UR ALBUMIN QUANTITATIVE: CPT

## 2020-10-12 PROCEDURE — G9717 DOC PT DX DEP/BP F/U NT REQ: HCPCS | Performed by: INTERNAL MEDICINE

## 2020-10-12 PROCEDURE — G8754 DIAS BP LESS 90: HCPCS | Performed by: INTERNAL MEDICINE

## 2020-10-12 PROCEDURE — G8427 DOCREV CUR MEDS BY ELIG CLIN: HCPCS | Performed by: INTERNAL MEDICINE

## 2020-10-12 PROCEDURE — G8536 NO DOC ELDER MAL SCRN: HCPCS | Performed by: INTERNAL MEDICINE

## 2020-10-12 PROCEDURE — G8417 CALC BMI ABV UP PARAM F/U: HCPCS | Performed by: INTERNAL MEDICINE

## 2020-10-12 PROCEDURE — 1090F PRES/ABSN URINE INCON ASSESS: CPT | Performed by: INTERNAL MEDICINE

## 2020-10-12 PROCEDURE — 99214 OFFICE O/P EST MOD 30 MIN: CPT | Performed by: INTERNAL MEDICINE

## 2020-10-12 PROCEDURE — 83036 HEMOGLOBIN GLYCOSYLATED A1C: CPT

## 2020-10-12 RX ORDER — PREGABALIN 75 MG/1
CAPSULE ORAL
COMMUNITY
End: 2021-05-03 | Stop reason: SDUPTHER

## 2020-10-12 NOTE — PROGRESS NOTES
History of Present Illness  Jaz Pham is a 70 y.o. female who presents today for management of    Chief Complaint   Patient presents with    Mass     above groin area for about 2 weeks       Groin Mass  Patient comes into the office today for evaluation of a mass on left groin. she initially identified the mass approximately 1 week ago. Since that time it has increased in size. The mass is described as: size 1.5cmcm, consistency soft. she does deny skin changes or other systemic symptoms. Back Pain  Patient presents for presents evaluation of low back problems. Symptoms have been present for a few years and include pain in lower back and radiates to the right posterior leg. Previous lower back problems: yes. Previous workup: MRI: lumbar disc bulges. Previous treatments: none. Diabetes Mellitus:  The patient has diabetes, hypertension and hyperlipidemia. Diabetic ROS - medication compliance: compliant all of the time, diabetic diet compliance: compliant most of the time, further diabetic ROS: no polyuria or polydipsia, no chest pain, dyspnea or TIA's, no numbness, tingling or pain in extremities. Lab review: orders written for new lab studies as appropriate; see orders. Cardiovascular Review:  Diet and Lifestyle: generally follows a low fat low cholesterol diet, generally follows a low sodium diet, sedentary, nonsmoker  Home BP Monitoring: is not measured at home. Pertinent ROS: taking medications as instructed, no medication side effects noted, no TIA's, no chest pain on exertion, no dyspnea on exertion, no swelling of ankles.      Problem List  Patient Active Problem List    Diagnosis Date Noted    Type 2 diabetes mellitus with diabetic polyneuropathy, without long-term current use of insulin ([address] 10/05/2020    Acquired hypothyroidism 10/05/2020    Anxiety and depression 10/05/2020    Hypercholesterolemia     Hypertension 07/31/2012       Current Medications  Current Outpatient Medications   Medication Sig    pregabalin (Lyrica) 75 mg capsule Take  by mouth.  Walker (Ultra-Light Rollator) misc Use as needed    pantoprazole (PROTONIX) 40 mg tablet Take 40 mg by mouth daily.  methylphenidate HCl (Ritalin) 10 mg tablet Take 10 mg by mouth.  atenoloL (TENORMIN) 50 mg tablet     simvastatin (ZOCOR) 10 mg tablet     SITagliptin-metFORMIN (Janumet)  mg per tablet  mg daily.  ascorbic acid, vitamin C, (VITAMIN C) 500 mg tablet Take 500 mg by mouth daily.  oxybutynin chloride XL (DITROPAN XL) 10 mg CR tablet     alendronate (FOSAMAX) 70 mg tablet     True Metrix Glucose Test Strip strip     Osphena 60 mg tab tablet TAKE ONE TABLET BY MOUTH EVERY DAY WITH FOOD    metFORMIN (GLUCOPHAGE) 500 mg tablet Take 500 mg by mouth two (2) times daily (with meals).  calcium-vitamin D 600 mg(1,500mg) -200 unit tab Take 2 Tabs by mouth two (2) times a day.  TRUEplus Lancets 28 gauge misc     losartan (COZAAR) 100 mg tablet     OLANZapine (ZyPREXA) 5 mg tablet     folic acid (FOLVITE) 400 mcg tablet Take 0.8 mg by mouth daily.  clonazePAM (KlonoPIN) 0.5 mg tablet     omega-3 fatty acids-vitamin e 1,000 mg cap Take 1 Cap by mouth.  amLODIPine (NORVASC) 2.5 mg tablet Take  by mouth daily.  multivitamin (ONE A DAY) tablet Take 1 Tab by mouth daily.  venlafaxine-SR (EFFEXOR-XR) 150 mg capsule Take  by mouth two (2) times a day.  levothyroxine (SYNTHROID) 50 mcg tablet Take  by mouth Daily (before breakfast).  aspirin 81 mg chewable tablet Take 81 mg by mouth daily.  cranberry extract 450 mg Tab Take  by mouth.  cyanocobalamin (VITAMIN B12) 500 mcg tablet Take 500 mcg by mouth daily.  acetaminophen (TYLENOL EXTRA STRENGTH) 500 mg tablet Take 2 Tabs by mouth every six (6) hours as needed for Pain. No current facility-administered medications for this visit.         Allergies/Drug Reactions  Allergies   Allergen Reactions    Codeine Itching    Pcn [Penicillins] Hives and Swelling    Meperidine Itching        Review of Systems  Review of Systems   Constitutional: Negative for chills, fever, malaise/fatigue and weight loss. Respiratory: Negative. Cardiovascular: Negative. Gastrointestinal: Negative. Genitourinary: Negative. Musculoskeletal: Negative. Neurological: Positive for tingling. Psychiatric/Behavioral: Positive for depression. Negative for memory loss and substance abuse. The patient is not nervous/anxious and does not have insomnia. Physical Exam  Vital signs:   Vitals:    10/12/20 1405 10/12/20 1428   BP: (!) 141/70 134/69   Pulse: 72    Resp: 15    Temp: 97.7 °F (36.5 °C)    TempSrc: Temporal    SpO2: 99%    Weight: 160 lb (72.6 kg)    Height: 5' 1\" (1.549 m)        General: alert, oriented, not in distress  Head: scalp normal, atraumatic  Eyes: pupils are equal and reactive, full and intact EOM's  Neck: supple, no JVD, no lymphadenopathy, non-palpable thyroid  Chest/Lungs: clear breath sounds, no wheezing or crackles  Heart: normal rate, regular rhythm, no murmur  Abdomen: soft, non-distended, non-tender, normal bowel sounds, no organomegaly, palpable soft mass on the left suprapubic area  Extremities: no focal deformities, no edema  Skin: no active skin lesions    Assessment/Plan:      1. Type 2 diabetes mellitus with diabetic polyneuropathy, without long-term current use of insulin (HCC)  - control uncertain  -check FOX8Q  - continue Janumet and metformin for now    2. Essential hypertension  - controlled    3. Hypercholesterolemia  - control uncertain  - check lipid panel    4. Acquired hypothyroidism  - check TSH    5. Anxiety and depression  - stable  - psych follow-up    6. Age-related osteoporosis without current pathological fracture  - continue Fosamax  - VITAMIN D, 25 HYDROXY; Future    7. Chronic right-sided low back pain with right-sided sciatica  - REFERRAL TO PHYSICAL THERAPY    8.  Impaired functional mobility, balance, gait, and endurance  - Walker (Ultra-Light Rollator) misc; Use as needed  Dispense: 1 Each; Refill: 0    9. Mass of soft tissue of abdomensu  - suspect lipoma or epidermal cyst  - [facility]; Future      Follow-up and Dispositions    · Return in about 3 months (around 1/12/2021) for HTN, HLD (DOXY), EOV. I have discussed the diagnosis with the patient and the intended plan as seen in the above orders. The patient has received an after-visit summary and questions were answered concerning future plans. I have discussed medication side effects and warnings with the patient as well. I have reviewed the plan of care with the patient, accepted their input and they are in agreement with the treatment goals. Ramón Rodrigez MD  October 12, 2020       This is the Subsequent Medicare Annual Wellness Exam, performed 12 months or more after the Initial AWV or the last Subsequent AWV    I have reviewed the patient's medical history in detail and updated the computerized patient record. History     Patient Active Problem List   Diagnosis Code    Hypertension I10    Hypercholesterolemia E78.00    Type 2 diabetes mellitus with diabetic polyneuropathy, without long-term current use of insulin (HCC) E11.42    Acquired hypothyroidism E03.9    Anxiety and depression F41.9, F32.9     Past Medical History:   Diagnosis Date    Breast cancer ([facility] 2002    Stage 1, right    Depression     Diabetes ([facility]     non insulin dependent    Hypercholesterolemia     Hypertension     Hypothyroidism     Lyme disease       Past Surgical History:   Procedure Laterality Date    HX APPENDECTOMY      HX BREAST LUMPECTOMY  2002    RIGHT BREAST    HX HYSTERECTOMY  2008    PRECANCEROUS CELLS ON RIGHT OVARY    HX SHOULDER ARTHROSCOPY      HX TONSILLECTOMY  AGE 15     Current Outpatient Medications   Medication Sig Dispense Refill    pregabalin (Lyrica) 75 mg capsule Take  by mouth.       Walker (Ultra-Light Rollator) misc Use as needed 1 Each 0    pantoprazole (PROTONIX) 40 mg tablet Take 40 mg by mouth daily.  methylphenidate HCl (Ritalin) 10 mg tablet Take 10 mg by mouth.  atenoloL (TENORMIN) 50 mg tablet       simvastatin (ZOCOR) 10 mg tablet       SITagliptin-metFORMIN (Janumet)  mg per tablet  mg daily.  ascorbic acid, vitamin C, (VITAMIN C) 500 mg tablet Take 500 mg by mouth daily.  oxybutynin chloride XL (DITROPAN XL) 10 mg CR tablet       alendronate (FOSAMAX) 70 mg tablet       True Metrix Glucose Test Strip strip       Osphena 60 mg tab tablet TAKE ONE TABLET BY MOUTH EVERY DAY WITH FOOD      metFORMIN (GLUCOPHAGE) 500 mg tablet Take 500 mg by mouth two (2) times daily (with meals).  calcium-vitamin D 600 mg(1,500mg) -200 unit tab Take 2 Tabs by mouth two (2) times a day.  TRUEplus Lancets 28 gauge misc       losartan (COZAAR) 100 mg tablet       OLANZapine (ZyPREXA) 5 mg tablet       folic acid (FOLVITE) 092 mcg tablet Take 0.8 mg by mouth daily.  clonazePAM (KlonoPIN) 0.5 mg tablet       omega-3 fatty acids-vitamin e 1,000 mg cap Take 1 Cap by mouth.  amLODIPine (NORVASC) 2.5 mg tablet Take  by mouth daily.  multivitamin (ONE A DAY) tablet Take 1 Tab by mouth daily.  venlafaxine-SR (EFFEXOR-XR) 150 mg capsule Take  by mouth two (2) times a day.  levothyroxine (SYNTHROID) 50 mcg tablet Take  by mouth Daily (before breakfast).  aspirin 81 mg chewable tablet Take 81 mg by mouth daily.  cranberry extract 450 mg Tab Take  by mouth.  cyanocobalamin (VITAMIN B12) 500 mcg tablet Take 500 mcg by mouth daily.  acetaminophen (TYLENOL EXTRA STRENGTH) 500 mg tablet Take 2 Tabs by mouth every six (6) hours as needed for Pain.  40 Tab 0     Allergies   Allergen Reactions    Codeine Itching    Pcn [Penicillins] Hives and Swelling    Meperidine Itching       Family History   Problem Relation Age of Onset    Heart Disease Mother     Heart Disease Father     Hypertension Sister     Heart Disease Brother      Social History     Tobacco Use    Smoking status: Never Smoker    Smokeless tobacco: Never Used   Substance Use Topics    Alcohol use: No       Depression Risk Factor Screening:     3 most recent PHQ Screens 10/5/2020   Little interest or pleasure in doing things Several days   Feeling down, depressed, irritable, or hopeless Several days   Total Score PHQ 2 2       Alcohol Risk Screen   Do you average more than 1 drink per night or more than 7 drinks a week:  No    On any one occasion in the past three months have you have had more than 3 drinks containing alcohol:  No    Functional Ability and Level of Safety:   Hearing: Hearing is good. Activities of Daily Living: The home contains: no safety equipment. Patient does total self care     Ambulation: with mild difficulty     Fall Risk:  Fall Risk Assessment, last 12 mths 10/12/2020   Able to walk? Yes   Fall in past 12 months? Yes   Fall with injury? No   Number of falls in past 12 months 2   Fall Risk Score 2     Abuse Screen:  Patient is not abused       Cognitive Screening   Has your family/caregiver stated any concerns about your memory: no     Cognitive Screening: Normal - Verbal Fluency Test    Patient Care Team   Patient Care Team:  Alexey Son MD as PCP - General (Internal Medicine)  Alexey Son MD as PCP - [address] [place] [address] Dr Aguila Provider  Brisa Archer MD (General Surgery)  Nicolette Leiva MD (Cardiology)  Lanna Felty, MD (Psychiatry)    Assessment/Plan   Education and counseling provided:  Are appropriate based on today's review and evaluation    Diagnoses and all orders for this visit:    1. Type 2 diabetes mellitus with diabetic polyneuropathy, without long-term current use of insulin ([address]    2. Essential hypertension    3. Hypercholesterolemia    4. Acquired hypothyroidism    5. Anxiety and depression    6. Age-related osteoporosis without current pathological fracture  -     VITAMIN D, 25 HYDROXY; Future    7. Chronic right-sided low back pain with right-sided sciatica  -     REFERRAL TO PHYSICAL THERAPY    8. Impaired functional mobility, balance, gait, and endurance  -     Walker (Ultra-Light Rollator) misc; Use as needed    9. Mass of soft tissue of abdomen  -      ABD LTD; Future    10. Gastroesophageal reflux disease, unspecified whether esophagitis present    11. Medicare annual wellness visit, subsequent    12. Screening for alcoholism  -     ID ANNUAL ALCOHOL SCREEN 15 MIN    13. Postmenopausal  -     DEXA BONE DENSITY STUDY AXIAL;  Future        Health Maintenance Due   Topic Date Due    Foot Exam Q1  11/07/1952    A1C test (Diabetic or Prediabetic)  11/07/1952    MICROALBUMIN Q1  11/07/1952    Eye Exam Retinal or Dilated  11/07/1952    Lipid Screen  11/07/1952    DTaP/Tdap/Td series (1 - Tdap) 11/07/1963    Shingrix Vaccine Age 50> (1 of 2) 11/07/1992    GLAUCOMA SCREENING Q2Y  11/07/2007    Pneumococcal 65+ years (1 of 1 - PPSV23) 11/07/2007    Medicare Yearly Exam  03/20/2018

## 2020-10-12 NOTE — PATIENT INSTRUCTIONS
Medicare Wellness Visit, Female The best way to live healthy is to have a lifestyle where you eat a well-balanced diet, exercise regularly, limit alcohol use, and quit all forms of tobacco/nicotine, if applicable. Regular preventive services are another way to keep healthy. Preventive services (vaccines, screening tests, monitoring & exams) can help personalize your care plan, which helps you manage your own care. Screening tests can find health problems at the earliest stages, when they are easiest to treat. [facility] follows the current, evidence-based guidelines published by the [facility] Rousseau ([facility]) when recommending preventive services for our patients. Because we follow these guidelines, sometimes recommendations change over time as research supports it. (For example, mammograms used to be recommended annually. Even though Medicare will still pay for an annual mammogram, the newer guidelines recommend a mammogram every two years for women of average risk). Of course, you and your doctor may decide to screen more often for some diseases, based on your risk and your co-morbidities (chronic disease you are already diagnosed with). Preventive services for you include: - Medicare offers their members a free annual wellness visit, which is time for you and your primary care provider to discuss and plan for your preventive service needs. Take advantage of this benefit every year! 
-All adults over the age of 72 should receive the recommended pneumonia vaccines. Current USPSTF guidelines recommend a series of two vaccines for the best pneumonia protection.  
-All adults should have a flu vaccine yearly and a tetanus vaccine every 10 years.  
-All adults age 48 and older should receive the shingles vaccines (series of two vaccines). -All adults age 38-68 who are overweight should have a diabetes screening test once every three years. -All adults born between [birth year] and 1965 should be screened once for Hepatitis C. 
-Other screening tests and preventive services for persons with diabetes include: an eye exam to screen for diabetic retinopathy, a kidney function test, a foot exam, and stricter control over your cholesterol.  
-Cardiovascular screening for adults with routine risk involves an electrocardiogram (ECG) at intervals determined by your doctor.  
-Colorectal cancer screenings should be done for adults age 54-65 with no increased risk factors for colorectal cancer. There are a number of acceptable methods of screening for this type of cancer. Each test has its own benefits and drawbacks. Discuss with your doctor what is most appropriate for you during your annual wellness visit. The different tests include: colonoscopy (considered the best screening method), a fecal occult blood test, a fecal DNA test, and sigmoidoscopy. 
 
-A bone mass density test is recommended when a woman turns 65 to screen for osteoporosis. This test is only recommended one time, as a screening. Some providers will use this same test as a disease monitoring tool if you already have osteoporosis. -Breast cancer screenings are recommended every other year for women of normal risk, age 54-69. 
-Cervical cancer screenings for women over age 72 are only recommended with certain risk factors. Here is a list of your current Health Maintenance items (your personalized list of preventive services) with a due date: 
Health Maintenance Due Topic Date Due  
 Diabetic Foot Care  11/07/1952  Hemoglobin A1C    11/07/1952  Albumin Urine Test  11/07/1952 [employer] Eye Exam  11/07/1952  Cholesterol Test   11/07/1952  DTaP/Tdap/Td  (1 - Tdap) 11/07/1963  Shingles Vaccine (1 of 2) 11/07/1992  Glaucoma Screening   11/07/2007  Pneumococcal Vaccine (1 of 1 - PPSV23) 11/07/2007 [employer] Annual Well Visit  03/20/2018

## 2020-10-13 LAB
CREAT UR-MCNC: 17 MG/DL (ref 30–125)
MICROALBUMIN UR-MCNC: <0.5 MG/DL (ref 0–3)
MICROALBUMIN/CREAT UR-RTO: ABNORMAL MG/G (ref 0–30)

## 2020-10-26 ENCOUNTER — APPOINTMENT (OUTPATIENT)
Dept: PHYSICAL THERAPY | Age: 80
End: 2020-10-26

## 2020-11-06 ENCOUNTER — APPOINTMENT (OUTPATIENT)
Dept: PHYSICAL THERAPY | Age: 80
End: 2020-11-06

## 2020-11-11 ENCOUNTER — TELEPHONE (OUTPATIENT)
Dept: FAMILY MEDICINE CLINIC | Age: 80
End: 2020-11-11

## 2020-11-11 NOTE — TELEPHONE ENCOUNTER
Called patient to go over her list of concerns but no answer. LVM. Will attempt to reach again tomorrow.

## 2020-11-13 NOTE — TELEPHONE ENCOUNTER
Spoke to patient and went through her list of concerns. 1. D/c vitamin B12 - not a current medication  2. Patient will call [facility] to schedule bone density scan and ultrasound  3. Discussed lab results  4.  Wrote Rx for regular walker with front wheels

## 2020-11-19 ENCOUNTER — APPOINTMENT (OUTPATIENT)
Dept: PHYSICAL THERAPY | Age: 80
End: 2020-11-19

## 2020-12-01 ENCOUNTER — HOSPITAL ENCOUNTER (OUTPATIENT)
Dept: GENERAL RADIOLOGY | Age: 80
Discharge: HOME | End: 2020-12-01
Attending: INTERNAL MEDICINE
Payer: MEDICARE

## 2020-12-01 ENCOUNTER — HOSPITAL ENCOUNTER (OUTPATIENT)
Dept: ULTRASOUND IMAGING | Age: 80
Discharge: HOME | End: 2020-12-01
Attending: INTERNAL MEDICINE
Payer: MEDICARE

## 2020-12-01 PROCEDURE — 76705 ECHO EXAM OF ABDOMEN: CPT

## 2020-12-01 PROCEDURE — 77080 DXA BONE DENSITY AXIAL: CPT

## 2020-12-03 ENCOUNTER — TELEPHONE (OUTPATIENT)
Dept: FAMILY MEDICINE CLINIC | Age: 80
End: 2020-12-03

## 2020-12-03 NOTE — TELEPHONE ENCOUNTER
----- Message from Ely De MD sent at 12/2/2020 12:10 PM EST -----  Please advise patient that bone density scan showed osteoporosis of the left wrist and normal on both hips. Continue fosamax. Encourage patient to take calcium and vitamin D daily. Soft tissue ultrasound di not reveal any abnormality. I believe the palpable mass is a collection of fatty tissue. No intervention needed at this time.

## 2020-12-09 NOTE — TELEPHONE ENCOUNTER
Called and spoke with patient and informed her about bone denisty scan and to continue fosamax. And she will continue to take her calcium and vit d.  Also informed her that mass may be just fatty tissue- she verbalized understanding

## 2020-12-21 ENCOUNTER — HOSPITAL ENCOUNTER (OUTPATIENT)
Dept: PHYSICAL THERAPY | Age: 80
Discharge: HOME | End: 2020-12-21
Payer: MEDICARE

## 2020-12-21 PROCEDURE — 97530 THERAPEUTIC ACTIVITIES: CPT

## 2020-12-21 PROCEDURE — 97162 PT EVAL MOD COMPLEX 30 MIN: CPT

## 2020-12-21 PROCEDURE — 97112 NEUROMUSCULAR REEDUCATION: CPT

## 2020-12-21 NOTE — PROGRESS NOTES
[facility] PHYSICAL THERAPY  [address] [facility] [place], [address] - Phone: [phone]  Fax: [phone] / [address] [facility]  Patient Name: Eduard Moctezuma : 1940   Medical   Diagnosis: Low back pain [M54.5] Treatment Diagnosis: LBP with right LE sciatica   Onset Date: Chronic     Referral Source: Eunice Turner MD Start of Care [facility]): 2020   Prior Hospitalization: See medical history Provider #: [id number]   Prior Level of Function: Chronic pain, independent with ADL's   Comorbidities: DM, Osteoporosis, Depression, OA, Thyroid, HTN, CA, Scoliosis   Medications: Verified on Patient Summary List   The Plan of Care and following information is based on the information from the initial evaluation.   ===========================================================================================  Assessment / key information: Patient is a 68 y.o. female presenting to clinic with CC chronic LBP x 25 years, progressively worsened over the past 5 years. She reports symptoms as 7/10 on VAS with BLE sensations radiating through her buttocks down to her posterior thigh. Patient reports intolerance towards prolonged static sitting, standing, and walking. She finds it difficult to perform ADL's. No apparent loss in dermatomes, myotomes, or reflex. MDT suggestive of flexion based protocol. The patient will benefit from physical therapist management to address her impairments (listed below),  educate her, and improve her level of function.  Thanks for your referral.   ===========================================================================================  Eval Complexity: History: HIGH Complexity :3+ comorbidities / personal factors will impact the outcome/ POC Exam:HIGH Complexity : 4+ Standardized tests and measures addressing body structure, function, activity limitation and / or participation in recreation  Presentation: MEDIUM Complexity : Evolving with changing characteristics  Clinical Decision Making:MEDIUM Complexity : FOTO score of 26-74Overall Complexity:MEDIUM  Problem List: pain affecting function, decrease ROM, decrease strength, impaired gait/ balance, decrease ADL/ functional abilitiies, decrease activity tolerance, decrease flexibility/ joint mobility and decrease transfer abilities  FOTO score: 43 indicating 57% functional disability   Treatment Plan may include any combination of the following: Therapeutic exercise, Therapeutic activities, Neuromuscular re-education, Physical agent/modality, Gait/balance training, Manual therapy, Patient education, Self Care training, Functional mobility training, Home safety training and Stair training  Patient / Family readiness to learn indicated by: asking questions, trying to perform skills and interest  Persons(s) to be included in education: patient (P)  Barriers to Learning/Limitations: yes;  financial  Measures taken: Establish independence in HEP   Patient Goal (s): \"Manage the pain better\"   Patient self reported health status: fair  Rehabilitation Potential: good   Short Term Goals: To be accomplished in  2  weeks:  1. Patient to be adherent to HEP to facilitate pain control with ADL's  2. Patient to centralize right LE sx to level of L/S to demonstrate effectiveness of directional preference exercises and decrease risk of LE dysfunction  3. Patient to improve 5 times sit to stand score to < 16 seconds to indicate reduced risk for falls.  Long Term Goals: To be accomplished in  4  weeks:  1. Patient to be Safe and Independent with HEP to self-manage/prevent symptoms after DC. 2. Patient to increase FS FOTO score to > 55 to indicate increased functional independence. 3. Patient to demonstrate safe stair negotiation with minimal complaints of LBP.    Frequency / Duration:   Patient to be seen  2  times per week for 4  weeks:  Patient / Caregiver education and instruction: activity modification and exercises. We reviewed our facility's Patient Personal Responsibilities (PPR) form, particularly in regards to compliance towards her appointment time, our attendance policy, and her home exercise program. Patient was informed of possible discharge for non-compliance to our attendance policy per PPR form. We also discussed her POC as deemed appropriate by the treating therapist and physician. Patient verbalized understanding that she must show objective and functional improvement in an appropriate time frame. Patient verbalized understanding that should progress or compliance be lacking, we will contact the referring physician for further consultation to address and attempt to establish alternate treatment strategies as necessary and/or possibly discharge. Therapist Signature: Valeria Thompson \"BJ\" Abdulaziz Michael DPT, Cert. MDT, CertBar DN, Cert. SMT, Dip. Osteopractic Date: 03/96/0486   Certification Period: 12/21/2020-2/20/21 Time: 3:44 PM   ===========================================================================================  I certify that the above Physical Therapy Services are being furnished while the patient is under my care. I agree with the treatment plan and certify that this therapy is necessary. Physician Signature:        Date:       Time:     Please sign and return to [facility] or you may fax the signed copy to [phone]. Thank you.
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
## 2020-12-22 ENCOUNTER — VIRTUAL VISIT (OUTPATIENT)
Dept: FAMILY MEDICINE CLINIC | Age: 80
End: 2020-12-22
Payer: MEDICARE

## 2020-12-22 DIAGNOSIS — M81.0 SENILE OSTEOPOROSIS: Primary | ICD-10-CM

## 2020-12-22 DIAGNOSIS — E11.42 TYPE 2 DIABETES MELLITUS WITH DIABETIC POLYNEUROPATHY, WITHOUT LONG-TERM CURRENT USE OF INSULIN (HCC): ICD-10-CM

## 2020-12-22 DIAGNOSIS — K59.04 CHRONIC IDIOPATHIC CONSTIPATION: ICD-10-CM

## 2020-12-22 DIAGNOSIS — R07.89 CHEST WALL PAIN: ICD-10-CM

## 2020-12-22 PROBLEM — I51.89 GRADE I DIASTOLIC DYSFUNCTION: Status: ACTIVE | Noted: 2020-12-22

## 2020-12-22 PROCEDURE — G8427 DOCREV CUR MEDS BY ELIG CLIN: HCPCS | Performed by: INTERNAL MEDICINE

## 2020-12-22 PROCEDURE — G8536 NO DOC ELDER MAL SCRN: HCPCS | Performed by: INTERNAL MEDICINE

## 2020-12-22 PROCEDURE — 1101F PT FALLS ASSESS-DOCD LE1/YR: CPT | Performed by: INTERNAL MEDICINE

## 2020-12-22 PROCEDURE — G9717 DOC PT DX DEP/BP F/U NT REQ: HCPCS | Performed by: INTERNAL MEDICINE

## 2020-12-22 PROCEDURE — 1090F PRES/ABSN URINE INCON ASSESS: CPT | Performed by: INTERNAL MEDICINE

## 2020-12-22 PROCEDURE — G8417 CALC BMI ABV UP PARAM F/U: HCPCS | Performed by: INTERNAL MEDICINE

## 2020-12-22 PROCEDURE — 99214 OFFICE O/P EST MOD 30 MIN: CPT | Performed by: INTERNAL MEDICINE

## 2020-12-22 PROCEDURE — G8756 NO BP MEASURE DOC: HCPCS | Performed by: INTERNAL MEDICINE

## 2020-12-22 NOTE — PROGRESS NOTES
Shira Willis is a 68 y.o. female who was seen by synchronous (real-time) audio-video technology using doxy. me on 12/22/2020. Location of the patient: Home    Location of the provider: [facility]    Consent:  She and/or health care decision maker is aware that that she may receive a bill for this telehealth service, depending on her insurance coverage, and has provided verbal consent to proceed: Yes    Subjective:   Shira Willis is a 68 y.o. female who presents today for management of    Chief Complaint   Patient presents with    GERD       Gas  Patient complains of gastroesophageal reflux with abdominal bloating and belching. Symptoms have been present for 3 weeks. She denies dysphagia. She  has not lost weight. She denies melena, hematochezia, hematemesis, and coffee ground emesis. She also has constipation. She denies chest pain, heartburn, midepigastric pain and nausea. Medical therapy in the past has included antacids. Osteoporosis / Osteopenia  She is an 68 y.o. female seen for Osteoporosis. She has not had history of fracture. Bone density is up to date and showed osteoporosis. Patient does not exercise daily. Patient does not smoke. Previous treatment calcium with vitamin D 1200mg daily and fosamax 70 mg weekly. Chest Wall Pain  Patient presents for presents evaluation of chest wall pain. Onset was 2 weeks ago. Pain description: character of chest pain: sharp  location: right costal margin  severity = mild  denies shortness of breath, hemoptysis and anginal type chest pain. Mechanism of injury: none known. Previous visits for this problem: none.   Evaluation to date: none  Treatment to date: none    Problem List  Patient Active Problem List    Diagnosis Date Noted    Senile osteoporosis 12/22/2020    Grade I diastolic dysfunction 69/57/1524    Type 2 diabetes mellitus with diabetic polyneuropathy, without long-term current use of insulin ([facility] [address] 10/05/2020    Acquired hypothyroidism 10/05/2020    Anxiety and depression 10/05/2020    Hypercholesterolemia     Hypertension 07/31/2012       Current Medications  Current Outpatient Medications   Medication Sig    pregabalin (Lyrica) 75 mg capsule Take  by mouth.  Walker (Ultra-Light Rollator) misc Use as needed    pantoprazole (PROTONIX) 40 mg tablet Take 40 mg by mouth daily.  methylphenidate HCl (Ritalin) 10 mg tablet Take 10 mg by mouth.  atenoloL (TENORMIN) 50 mg tablet     simvastatin (ZOCOR) 10 mg tablet     SITagliptin-metFORMIN (Janumet)  mg per tablet  mg daily.  ascorbic acid, vitamin C, (VITAMIN C) 500 mg tablet Take 500 mg by mouth daily.  oxybutynin chloride XL (DITROPAN XL) 10 mg CR tablet     alendronate (FOSAMAX) 70 mg tablet     True Metrix Glucose Test Strip strip     Osphena 60 mg tab tablet TAKE ONE TABLET BY MOUTH EVERY DAY WITH FOOD    metFORMIN (GLUCOPHAGE) 500 mg tablet Take 500 mg by mouth two (2) times daily (with meals).  calcium-vitamin D 600 mg(1,500mg) -200 unit tab Take 2 Tabs by mouth two (2) times a day.  TRUEplus Lancets 28 gauge misc     losartan (COZAAR) 100 mg tablet     OLANZapine (ZyPREXA) 5 mg tablet     folic acid (FOLVITE) 619 mcg tablet Take 0.8 mg by mouth daily.  clonazePAM (KlonoPIN) 0.5 mg tablet     acetaminophen (TYLENOL EXTRA STRENGTH) 500 mg tablet Take 2 Tabs by mouth every six (6) hours as needed for Pain.  omega-3 fatty acids-vitamin e 1,000 mg cap Take 1 Cap by mouth.  amLODIPine (NORVASC) 2.5 mg tablet Take  by mouth daily.  multivitamin (ONE A DAY) tablet Take 1 Tab by mouth daily.  venlafaxine-SR (EFFEXOR-XR) 150 mg capsule Take  by mouth two (2) times a day.  levothyroxine (SYNTHROID) 50 mcg tablet Take  by mouth Daily (before breakfast).  aspirin 81 mg chewable tablet Take 81 mg by mouth daily.  cranberry extract 450 mg Tab Take  by mouth.        No current facility-administered medications for this visit. Allergies/Drug Reactions  Allergies   Allergen Reactions    Codeine Itching    Pcn [Penicillins] Hives and Swelling    Meperidine Itching        Social History  Social History     Tobacco Use    Smoking status: Never Smoker    Smokeless tobacco: Never Used   Substance Use Topics    Alcohol use: No    Drug use: No        Review of Systems  Review of Systems   Constitutional: Negative for chills, fever, malaise/fatigue and weight loss. Respiratory: Negative. Cardiovascular: Negative for chest pain and palpitations. Gastrointestinal: Positive for constipation. Negative for abdominal pain, heartburn, nausea and vomiting. Neurological: Negative for dizziness and headaches. Objective:     General: alert, cooperative, no distress   Mental  status: mental status: alert, oriented to person, place, and time, normal mood, behavior, speech, dress, motor activity, and thought processes   Resp: resp: normal effort and no respiratory distress   Neuro: neuro: no gross deficits   Skin: skin: no discoloration or lesions of concern on visible areas     Due to this being a TeleHealth evaluation, many elements of the physical examination are unable to be assessed. Assessment & Plan:   1. Senile osteoporosis  - continue Fosamax, calcium and vitamin D    2. Chronic Constipation  - advised to increase activity level  - increase fiber intake  - limit gas-producing foods    3. Chest wall pain  - suspect right-sided costochondritis  - NSAID as needed    4. Chronic low back pain  - Patient will start physical therapy. 5. Diabetes mellitus  - REFERRAL TO NUTRITION    Follow-up and Dispositions    · Return in about 1 month (around 1/22/2021) for ROV DOXY. We discussed the expected course, resolution and complications of the diagnosis(es) in detail. Medication risks, benefits, costs, interactions, and alternatives were discussed as indicated.   I advised her to contact the office if her condition worsens, changes or fails to improve as anticipated. She expressed understanding with the diagnosis(es) and plan. Pursuant to the emergency declaration under the [facility], [facility] waiver authority and the [employer] Act, this Virtual  Visit was conducted, with patient's consent, to reduce the patient's risk of exposure to COVID-19 and provide continuity of care for an established patient. Services were provided through a video synchronous discussion virtually to substitute for in-person clinic visit.     Dee Galeana MD

## 2020-12-29 ENCOUNTER — HOSPITAL ENCOUNTER (OUTPATIENT)
Dept: PHYSICAL THERAPY | Age: 80
Discharge: HOME | End: 2020-12-29
Payer: MEDICARE

## 2020-12-29 PROCEDURE — 97530 THERAPEUTIC ACTIVITIES: CPT

## 2020-12-29 PROCEDURE — 97110 THERAPEUTIC EXERCISES: CPT

## 2020-12-29 NOTE — PROGRESS NOTES
PHYSICAL THERAPY - DAILY TREATMENT NOTE    Patient Name: Benita Ly        Date: 2020  : 1940   yes Patient  Verified  Visit #:   2   of   8  Insurance: Payor: Miri Shelton / Plan: [address] [place] [address] HMO / Product Type: Managed Care Medicare /      In time: 245 Out time: 340   Total Treatment Time: 55     Medicare/BCBS Time Tracking (below)   Total Timed Codes (min):  45 1:1 Treatment Time:  45     TREATMENT AREA =  Low back pain [M54.5]    SUBJECTIVE  Pain Level (on 0 to 10 scale):  5  / 10   Medication Changes/New allergies or changes in medical history, any new surgeries or procedures?    no  If yes, update Summary List   Subjective Functional Status/Changes:  []  No changes reported     \"I am weak in the legs.  I get tired quickly on this (nu-step)\"         OBJECTIVE  Modalities Rationale:     decrease pain to improve patient's ability to safely perform ADLs/ bending/stooping/ lifting/prolong sitting, stding and amb/ stairs with minimal to no pain     min [] Estim, type/location:                                      []  att     []  unatt     []  w/US     []  w/ice    []  w/heat    min []  Mechanical Traction: type/lbs                   []  pro   []  sup   []  int   []  cont    []  before manual    []  after manual    min []  Ultrasound, settings/location:      min []  Iontophoresis w/ dexamethasone, location:                                               []  take home patch       []  in clinic   10 min []  Ice     [x]  Heat    location/position: Supine with wedge under B LEs    min []  Vasopneumatic Device, press/temp:     min []  Other:    [] Skin assessment post-treatment (if applicable):    []  intact    []  redness- no adverse reaction     []redness  adverse reaction:        35 min Therapeutic Exercise:  [x]  See flow sheet   Rationale:      increase ROM, increase strength and improve coordination to improve the patients ability to safely perform ADLs, bending/stooping/ lifting; prolong sitting, standing and ambulation; and negotiate stairs with minimal to no pain     10 min Therapeutic Activity: [x]  See flow sheet  postural/bed mobility training   Rationale:    increase ROM, increase strength and improve coordination to improve the patients ability to safely perform ADLs, bending/stooping/ lifting;prolong sitting, standing and ambulation; and negotiate stairs with minimal to no pain      Billed With/As:   [x] TE   [x] TA   [] Neuro   [] Self Care Patient Education: [x] Review HEP    [] Progressed/Changed HEP based on:   [x] positioning   [x] body mechanics   [x] transfers   [x] heat/ice application    [x] other: Pt ed on importance and benefits of compliance with HEP, core strength/stability and proper posture; pt verbalized understanding  issued OTB     Other Objective/Functional Measures:    VCs + demo to perform proper technique for TE  Initiated TE per flowsheet without c/o p!  c/o fatigue on Nu-step and pulling in L/s with sitting upright for seated TE    Reviewed proper bed mobility, sleeping positions, lifting techniques and importance and benefits of a neutral spine       Post Treatment Pain Level (on 0 to 10) scale:   5  / 10     ASSESSMENT  Assessment/Changes in Function:     Progressed there-ex without c/o increase p!  demos proper bed mobility with instruction   demos decrease core/L/s strength in sitting    []  See Progress Note/Recertification   Patient will continue to benefit from skilled PT services to modify and progress therapeutic interventions, address functional mobility deficits, address ROM deficits, address strength deficits, analyze and address soft tissue restrictions, analyze and cue movement patterns, analyze and modify body mechanics/ergonomics, assess and modify postural abnormalities and instruct in home and community integration to attain remaining goals.    Progress toward goals / Updated goals:  Pt's first visit since IE, no noted progress PLAN  [x]  Upgrade activities as tolerated yes Continue plan of care   []  Discharge due to :    []  Other:      Therapist: Joel Gross PTA    Date: 12/29/2020 Time: 3:24 PM     Future Appointments   Date Time Provider Alex Monte   1/5/2021  2:45 PM Jared Ashby, PT [facility]   1/7/2021  2:45 PM Jared Ashby, PT [facility]   1/12/2021  2:45 PM Jared Ashby, PT [facility]   1/14/2021  2:45 PM Jared Ashby, PT [facility]   1/19/2021  2:45 PM Randytimothy Woodard [facility]   1/21/2021  2:45 PM Jared Ashby PT [facility]   1/22/2021  1:00 PM Andreia Kraft MD [facility] AM[facility]   1/26/2021  2:45 PM Randy Traceya [facility]   1/28/2021  2:45 PM Jared Ashby PT [facility]

## 2021-01-05 ENCOUNTER — HOSPITAL ENCOUNTER (OUTPATIENT)
Dept: PHYSICAL THERAPY | Age: 81
Discharge: HOME | End: 2021-01-05
Payer: MEDICARE

## 2021-01-05 PROCEDURE — 97110 THERAPEUTIC EXERCISES: CPT

## 2021-01-05 NOTE — PROGRESS NOTES
PHYSICAL THERAPY - DAILY TREATMENT NOTE    Patient Name: Roger Strong        Date: 2021  : 1940   YES Patient  Verified  Visit #:   3     Insurance: Payor: Shreyasniurka Gee / Plan: [address] [place] [address] HMO / Product Type: Managed Care Medicare /      In time: 2:38 Out time: 3:25   Total Treatment Time: 38     Medicare/BCBS Time Tracking (below)   Total Timed Codes (min):  38 1:1 Treatment Time:  38     TREATMENT AREA = Low back pain [M54.5]    SUBJECTIVE    Pain Level (on 0 to 10 scale):  5  / 10   Medication Changes/New allergies or changes in medical history, any new surgeries or procedures? NO    If yes, update Summary List   Subjective Functional Status/Changes:  []  No changes reported     \"I was a bit sore after last time. \"          OBJECTIVE     Therapeutic Procedures:  Min Procedure Specifics + Rationale   n/a [x]  Patient Education (performed throughout session) [x] Review HEP    [] Progressed/Changed HEP based on:   [] proper performance and advancement of Therex/TA   [] reduction in pain level    [] increased functional capacity       [] change in directional preference   38(38) [x] Therapeutic Exercise   [x]  See Flowsheet   Rationale: increase ROM and increase strength to improve the patients ability to participate in ADL's      Modality rationale: decrease inflammation, decrease pain, increase tissue extensibility and increase muscle contraction/control to improve the patients ability to perform ADL's with greater ease     Min Type Additional Details   10 [x]  Heat         [] pre-KEO      [x] post-KEO Location:L/S    [x] supine              [] prone     [x] legs elevated    [] legs flat   [] sitting   [x] Skin assessment post-treatment:  [x]intact [x]redness- no adverse reaction       []redness  adverse reaction:     Other Objective/Functional Measures:    Increased reps/sets/resistance per flow sheet.        Post Treatment Pain Level (on 0 to 10) scale:   0  / 10 ASSESSMENT    Assessment/Changes in Function:       Responded well to current POC as patient reports reduced symptoms at end of treatment pre-modality. Patient will continue to benefit from skilled PT services to modify and progress therapeutic interventions, address functional mobility deficits, address ROM deficits, address strength deficits, analyze and address soft tissue restrictions, analyze and cue movement patterns, analyze and modify body mechanics/ergonomics and instruct in home and community integration  to attain remaining goals   Progress toward goals / Updated goals:    Compliant towards HEP     PLAN    [x]  Upgrade activities as tolerated  [x]  Update interventions per flow sheet YES Continue plan of care   []  Discharge due to :    []  Other:      Therapist: Dorothy Resendiz \"BJ\" Luisito, DPT, Cert. MDT, Cert. DN, Cert. SMT, Dip.  Osteopractic    Date: 1/5/2021 Time: 2:42 PM     Future Appointments   Date Time Provider Alex Monte   1/5/2021  2:45 PM Amira Terry PT [facility]   1/7/2021  2:45 PM Amira Terry PT [facility]   1/12/2021  2:45 PM Amira Terry PT [facility]   1/14/2021  2:45 PM Amira Terry PT [facility]   1/19/2021  2:45 PM Lawanda Russara [facility]   1/21/2021  2:45 PM Amira Terry PT [facility]   1/22/2021  1:00 PM Sara Nichols MD [facility]   1/26/2021  2:45 PM Lawanda Russara [facility]   1/28/2021  2:45 PM Amira Terry PT [facility]

## 2021-01-07 ENCOUNTER — HOSPITAL ENCOUNTER (OUTPATIENT)
Dept: PHYSICAL THERAPY | Age: 81
Discharge: HOME | End: 2021-01-07
Payer: MEDICARE

## 2021-01-07 PROCEDURE — 97112 NEUROMUSCULAR REEDUCATION: CPT

## 2021-01-07 PROCEDURE — 97110 THERAPEUTIC EXERCISES: CPT

## 2021-01-07 NOTE — PROGRESS NOTES
PHYSICAL THERAPY - DAILY TREATMENT NOTE    Patient Name: Doroteo Cox        Date: 2021  : 1940   YES Patient  Verified  Visit #:   4     Insurance: Payor: Yulia Clemente / Plan: [address] [place] [address] HMO / Product Type: Managed Care Medicare /      In time: 2:35 Out time: 3:13   Total Treatment Time: 38     Medicare/BCBS Time Tracking (below)   Total Timed Codes (min):  38 1:1 Treatment Time:  38     TREATMENT AREA = Low back pain [M54.5]    SUBJECTIVE    Pain Level (on 0 to 10 scale):  4   10   Medication Changes/New allergies or changes in medical history, any new surgeries or procedures? NO    If yes, update Summary List   Subjective Functional Status/Changes:  []  No changes reported     \"Can you print me the new exercises? Can we also do some balance. I'm really afraid of falling. \"          OBJECTIVE     Therapeutic Procedures:  Min Procedure Specifics + Rationale   n/a [x]  Patient Education (performed throughout session) [x] Review HEP    [] Progressed/Changed HEP based on:   [] proper performance and advancement of Therex/TA   [] reduction in pain level    [] increased functional capacity       [] change in directional preference   30) [x] Therapeutic Exercise   [x]  See Flowsheet   Rationale: increase ROM and increase strength to improve the patients ability to participate in ADL's    8(8) [x] Neuromuscular Re-ed   [x]  See Flowsheet  MSR EO/EC  Rationale: increase ROM, increase strength, improve coordination, improve balance and increase proprioception  to improve the patients ability to safely participate in ADL's         Other Objective/Functional Measures:    Significant difficulty performing MSR with left lead     Post Treatment Pain Level (on 0 to 10) scale:   0  / 10     ASSESSMENT    Assessment/Changes in Function:     Notable lack in balance on left.           Patient will continue to benefit from skilled PT services to modify and progress therapeutic interventions, address functional mobility deficits, address ROM deficits, address strength deficits, analyze and address soft tissue restrictions, analyze and cue movement patterns, analyze and modify body mechanics/ergonomics and instruct in home and community integration  to attain remaining goals   Progress toward goals / Updated goals:    Progressing in ADL's     PLAN    [x]  Upgrade activities as tolerated  [x]  Update interventions per flow sheet YES Continue plan of care   []  Discharge due to :    []  Other:      Therapist: Ruben Spruce \"BJ\" Luisito, EUFEMIAT, Cert. MDT, Cert. DN, Cert. SMT, Dip.  Osteopractic    Date: 1/7/2021 Time: 2:38 PM     Future Appointments   Date Time Provider Alex Monte   1/7/2021  2:45 PM Inell Pac, PT [facility]   1/12/2021  2:45 PM Inell Pac, PT [facility]   1/14/2021  2:45 PM Inell Pac, PT [facility]   1/19/2021  2:45 PM Creasie Memphisas [facility]   1/21/2021  2:45 PM Inell Pac, PT [facility]   1/22/2021  1:00 PM Cristian Funes MD [facility] AMB   1/26/2021  2:45 PM Creasie Nicoleas [facility]   1/28/2021  2:45 PM Inell Pac, PT [facility]

## 2021-01-12 ENCOUNTER — HOSPITAL ENCOUNTER (OUTPATIENT)
Dept: PHYSICAL THERAPY | Age: 81
Discharge: HOME | End: 2021-01-12
Payer: MEDICARE

## 2021-01-12 PROCEDURE — 97110 THERAPEUTIC EXERCISES: CPT

## 2021-01-12 PROCEDURE — 97112 NEUROMUSCULAR REEDUCATION: CPT

## 2021-01-12 NOTE — PROGRESS NOTES
PHYSICAL THERAPY - DAILY TREATMENT NOTE    Patient Name: Stefan Pina        Date: 2021  : 1940   YES Patient  Verified  Visit #:   5     Insurance: Payor: Leann Coy / Plan: [address] [place] [address] HMO / Product Type: Managed Care Medicare /      In time: 2:35early Out time: 3:40   Total Treatment Time: 65     Medicare/BCBS Time Tracking (below)   Total Timed Codes (min):  65 1:1 Treatment Time:  55     TREATMENT AREA = Low back pain [M54.5]    SUBJECTIVE    Pain Level (on 0 to 10 scale):  3  / 10   Medication Changes/New allergies or changes in medical history, any new surgeries or procedures?     NO    If yes, update Summary List   Subjective Functional Status/Changes:  []  No changes reported     \"I'm still getting better \"          OBJECTIVE     Therapeutic Procedures:  Min Procedure Specifics + Rationale   n/a [x]  Patient Education (performed throughout session) [x] Review HEP    [] Progressed/Changed HEP based on:   [] proper performance and advancement of Therex/TA   [] reduction in pain level    [] increased functional capacity       [] change in directional preference   45(45) [x] Therapeutic Exercise   [x]  See Flowsheet   Rationale: increase ROM and increase strength to improve the patients ability to participate in ADL's    10(10) [x] Neuromuscular Re-ed   [x]  See Flowsheet  Repeated movements   Rationale: increase ROM, increase strength, improve coordination, improve balance and increase proprioception  to improve the patients ability to safely participate in ADL's     Modality rationale: decrease inflammation, decrease pain, increase tissue extensibility and increase muscle contraction/control to improve the patients ability to perform ADL's with greater ease     Min Type Additional Details   10 [x]  Heat         [] pre-KEO      [x] post-KEO Location:L/S    [x] supine              [] prone     [x] legs elevated    [] legs flat   [] sitting   [x] Skin assessment post-treatment:  [x]intact [x]redness- no adverse reaction       []redness  adverse reaction:     Other Objective/Functional Measures: Added new therex per flow sheet. Post Treatment Pain Level (on 0 to 10) scale:   0  / 10     ASSESSMENT    Assessment/Changes in Function:       Improving in mobility and overall reduction in pain         Patient will continue to benefit from skilled PT services to modify and progress therapeutic interventions, address functional mobility deficits, address ROM deficits, address strength deficits, analyze and address soft tissue restrictions, analyze and cue movement patterns, analyze and modify body mechanics/ergonomics and instruct in home and community integration  to attain remaining goals   Progress toward goals / Updated goals:    Progressing in relief     PLAN    [x]  Upgrade activities as tolerated  [x]  Update interventions per flow sheet YES Continue plan of care   []  Discharge due to :    []  Other:      Therapist: Danielle Varma \"BJ\" Eduard Lim, DPT, Cert. MDT, Cert. DN, Cert. SMT, Dip.  Osteopractic    Date: 1/12/2021 Time: 2:48 PM     Future Appointments   Date Time Provider Alex Monte   1/14/2021  2:45 PM Wilner Karimi PT [facility]   1/19/2021  2:45 PM Morelia Tourempf [facility]   1/21/2021  2:45 PM Wilner Karimi PT [facility]   1/22/2021  1:00 PM Alaina Walsh MD [facility] BS AMB   1/26/2021  2:45 PM Morelia Hernandez [facility]   1/28/2021  2:45 PM Wilner Karimi PT [facility]

## 2021-01-14 ENCOUNTER — HOSPITAL ENCOUNTER (OUTPATIENT)
Dept: PHYSICAL THERAPY | Age: 81
Discharge: HOME | End: 2021-01-14
Payer: MEDICARE

## 2021-01-14 PROCEDURE — 97110 THERAPEUTIC EXERCISES: CPT

## 2021-01-14 PROCEDURE — 97112 NEUROMUSCULAR REEDUCATION: CPT

## 2021-01-14 NOTE — PROGRESS NOTES
PHYSICAL THERAPY - DAILY TREATMENT NOTE    Patient Name: Dee Mclean        Date: 2021  : 1940   YES Patient  Verified  Visit #:     Insurance: Payor: Alejandra Del Cid / Plan: [address] [place] [address] HMO / Product Type: Managed Care Medicare /      In time: 2:35 Out time: 3:40   Total Treatment Time: 65     Medicare/BCBS Time Tracking (below)   Total Timed Codes (min):  65 1:1 Treatment Time:  55     TREATMENT AREA = Low back pain [M54.5]    SUBJECTIVE    Pain Level (on 0 to 10 scale):  0  / 10   Medication Changes/New allergies or changes in medical history, any new surgeries or procedures? NO    If yes, update Summary List   Subjective Functional Status/Changes:  []  No changes reported     \"I'm actually doing pretty good except for my balance.  \"          OBJECTIVE     Therapeutic Procedures:  Min Procedure Specifics + Rationale   n/a [x]  Patient Education (performed throughout session) [x] Review HEP    [] Progressed/Changed HEP based on:   [] proper performance and advancement of Therex/TA   [] reduction in pain level    [] increased functional capacity       [] change in directional preference   45(45) [x] Therapeutic Exercise   [x]  See Flowsheet   Rationale: increase ROM and increase strength to improve the patients ability to participate in ADL's    10) [x] Neuromuscular Re-ed   [x]  See Flowsheet  RFISitting  Rationale: increase ROM, increase strength, improve coordination, improve balance and increase proprioception  to improve the patients ability to safely participate in ADL's     Modality rationale: decrease inflammation, decrease pain, increase tissue extensibility and increase muscle contraction/control to improve the patients ability to perform ADL's with greater ease     Min Type Additional Details   10 [x]  Heat         [] pre-KEO      [x] post-KEO Location:Ls    [] supine              [] prone     [] legs elevated    [] legs flat   [] sitting   [x] Skin assessment post-treatment:  [x]intact [x]redness- no adverse reaction       []redness  adverse reaction:     Other Objective/Functional Measures:    Increased reps/sets/resistance per flow sheet. Post Treatment Pain Level (on 0 to 10) scale:   0  / 10     ASSESSMENT    Assessment/Changes in Function:       Continues to respond well towards flexion, improved uninterrupted standing tolerance         Patient will continue to benefit from skilled PT services to modify and progress therapeutic interventions, address functional mobility deficits, address ROM deficits, address strength deficits, analyze and address soft tissue restrictions, analyze and cue movement patterns, assess and modify postural abnormalities, address imbalance/dizziness and instruct in home and community integration  to attain remaining goals   Progress toward goals / Updated goals:    Progressing well in standing tolerance     PLAN    [x]  Upgrade activities as tolerated  [x]  Update interventions per flow sheet YES Continue plan of care   []  Discharge due to :    []  Other:      Therapist: Felipe Darling \"BJ\" Cong Bland, DPT, Cert. MDT, Cert. DN, Cert. SMT, Dip.  Osteopractic    Date: 1/14/2021 Time: 3:07 PM     Future Appointments   Date Time Provider Alex Monte   1/19/2021  2:45 PM Chao Pipe [facility]   1/21/2021  2:45 PM Divya Chen PT [facility]   1/22/2021  1:00 PM Santos Reynolds MD [facility] AMB   1/26/2021  2:45 PM Chao Pipe [facility]   1/28/2021  2:45 PM Divya Chen PT [facility]

## 2021-01-19 ENCOUNTER — APPOINTMENT (OUTPATIENT)
Dept: PHYSICAL THERAPY | Age: 81
End: 2021-01-19
Payer: MEDICARE

## 2021-01-21 ENCOUNTER — APPOINTMENT (OUTPATIENT)
Dept: PHYSICAL THERAPY | Age: 81
End: 2021-01-21
Payer: MEDICARE

## 2021-01-22 ENCOUNTER — VIRTUAL VISIT (OUTPATIENT)
Dept: FAMILY MEDICINE CLINIC | Age: 81
End: 2021-01-22
Payer: MEDICARE

## 2021-01-22 DIAGNOSIS — B00.9 RECURRENT HERPES SIMPLEX: ICD-10-CM

## 2021-01-22 DIAGNOSIS — E03.9 ACQUIRED HYPOTHYROIDISM: ICD-10-CM

## 2021-01-22 DIAGNOSIS — I10 ESSENTIAL HYPERTENSION: ICD-10-CM

## 2021-01-22 DIAGNOSIS — F41.9 ANXIETY AND DEPRESSION: ICD-10-CM

## 2021-01-22 DIAGNOSIS — E11.42 TYPE 2 DIABETES MELLITUS WITH DIABETIC POLYNEUROPATHY, WITHOUT LONG-TERM CURRENT USE OF INSULIN (HCC): Primary | ICD-10-CM

## 2021-01-22 DIAGNOSIS — M81.0 SENILE OSTEOPOROSIS: ICD-10-CM

## 2021-01-22 DIAGNOSIS — E78.00 HYPERCHOLESTEROLEMIA: ICD-10-CM

## 2021-01-22 DIAGNOSIS — F32.A ANXIETY AND DEPRESSION: ICD-10-CM

## 2021-01-22 PROCEDURE — G8756 NO BP MEASURE DOC: HCPCS | Performed by: INTERNAL MEDICINE

## 2021-01-22 PROCEDURE — 99214 OFFICE O/P EST MOD 30 MIN: CPT | Performed by: INTERNAL MEDICINE

## 2021-01-22 PROCEDURE — G8427 DOCREV CUR MEDS BY ELIG CLIN: HCPCS | Performed by: INTERNAL MEDICINE

## 2021-01-22 PROCEDURE — 1090F PRES/ABSN URINE INCON ASSESS: CPT | Performed by: INTERNAL MEDICINE

## 2021-01-22 PROCEDURE — G9717 DOC PT DX DEP/BP F/U NT REQ: HCPCS | Performed by: INTERNAL MEDICINE

## 2021-01-22 PROCEDURE — 1101F PT FALLS ASSESS-DOCD LE1/YR: CPT | Performed by: INTERNAL MEDICINE

## 2021-01-22 RX ORDER — VALACYCLOVIR HYDROCHLORIDE 500 MG/1
500 TABLET, FILM COATED ORAL 2 TIMES DAILY
Qty: 6 TAB | Refills: 0 | Status: SHIPPED | OUTPATIENT
Start: 2021-01-22 | End: 2021-01-25

## 2021-01-22 RX ORDER — SIMVASTATIN 10 MG/1
10 TABLET, FILM COATED ORAL
Qty: 90 TAB | Refills: 3 | Status: SHIPPED | OUTPATIENT
Start: 2021-01-22 | End: 2021-09-24 | Stop reason: SDUPTHER

## 2021-01-22 RX ORDER — LOSARTAN POTASSIUM 100 MG/1
100 TABLET ORAL DAILY
Qty: 90 TAB | Refills: 3 | Status: SHIPPED | OUTPATIENT
Start: 2021-01-22 | End: 2021-09-24 | Stop reason: SDUPTHER

## 2021-01-22 NOTE — PROGRESS NOTES
Franky Navarro is a 68 y.o. female who was seen by synchronous (real-time) audio-video technology using doxy. me on 1/22/2021. Location of the patient: Home    Location of the provider: [facility]    Consent:  She and/or health care decision maker is aware that that she may receive a bill for this telehealth service, depending on her insurance coverage, and has provided verbal consent to proceed: Yes    Subjective:   Franky Navarro is a 68 y.o. female who presents today for management of    Chief Complaint   Patient presents with    Diabetes    Cholesterol Problem    Hypertension       Patient complains of blisters on her right medial thigh. She reports of having recurrent lesions for many years. Initial infection was in her vulvar area. She used to take Valtrex during flare-ups. Diabetes Mellitus:  She has diabetes mellitus, and  hypertension and hyperlipidemia. Diabetic ROS - medication compliance: compliant all of the time, diabetic diet compliance: compliant most of the time, home glucose monitoring: values are usually normal.   Lab review: labs are reviewed, up to date and normal.     Problem List  Patient Active Problem List    Diagnosis Date Noted    Senile osteoporosis 12/22/2020    Grade I diastolic dysfunction 18/51/3137    Type 2 diabetes mellitus with diabetic polyneuropathy, without long-term current use of insulin ([facility] [address] 10/05/2020    Acquired hypothyroidism 10/05/2020    Anxiety and depression 10/05/2020    Hypercholesterolemia     Hypertension 07/31/2012       Current Medications  Current Outpatient Medications   Medication Sig    simvastatin (ZOCOR) 10 mg tablet Take 1 Tab by mouth nightly.  losartan (COZAAR) 100 mg tablet Take 1 Tab by mouth daily.  valACYclovir (VALTREX) 500 mg tablet Take 1 Tab by mouth two (2) times a day for 3 days.  pregabalin (Lyrica) 75 mg capsule Take  by mouth.    Jamesetta Medal Walker (Ultra-Light Rollator) misc Use as needed   Jamesetta Medal pantoprazole (PROTONIX) 40 mg tablet Take 40 mg by mouth daily.  methylphenidate HCl (Ritalin) 10 mg tablet Take 10 mg by mouth.  atenoloL (TENORMIN) 50 mg tablet     SITagliptin-metFORMIN (Janumet)  mg per tablet  mg daily.  ascorbic acid, vitamin C, (VITAMIN C) 500 mg tablet Take 500 mg by mouth daily.  oxybutynin chloride XL (DITROPAN XL) 10 mg CR tablet     alendronate (FOSAMAX) 70 mg tablet     True Metrix Glucose Test Strip strip     Osphena 60 mg tab tablet TAKE ONE TABLET BY MOUTH EVERY DAY WITH FOOD    metFORMIN (GLUCOPHAGE) 500 mg tablet Take 500 mg by mouth two (2) times daily (with meals).  calcium-vitamin D 600 mg(1,500mg) -200 unit tab Take 2 Tabs by mouth two (2) times a day.  TRUEplus Lancets 28 gauge misc     OLANZapine (ZyPREXA) 5 mg tablet     folic acid (FOLVITE) 826 mcg tablet Take 0.8 mg by mouth daily.  clonazePAM (KlonoPIN) 0.5 mg tablet     acetaminophen (TYLENOL EXTRA STRENGTH) 500 mg tablet Take 2 Tabs by mouth every six (6) hours as needed for Pain.  omega-3 fatty acids-vitamin e 1,000 mg cap Take 1 Cap by mouth.  amLODIPine (NORVASC) 2.5 mg tablet Take  by mouth daily.  multivitamin (ONE A DAY) tablet Take 1 Tab by mouth daily.  venlafaxine-SR (EFFEXOR-XR) 150 mg capsule Take  by mouth two (2) times a day.  levothyroxine (SYNTHROID) 50 mcg tablet Take  by mouth Daily (before breakfast).  aspirin 81 mg chewable tablet Take 81 mg by mouth daily.  cranberry extract 450 mg Tab Take  by mouth. No current facility-administered medications for this visit.         Allergies/Drug Reactions  Allergies   Allergen Reactions    Codeine Itching    Pcn [Penicillins] Hives and Swelling    Meperidine Itching        Social History  Social History     Tobacco Use    Smoking status: Never Smoker    Smokeless tobacco: Never Used   Substance Use Topics    Alcohol use: No    Drug use: No        Review of Systems  Review of Systems Constitutional: Negative for chills, fever, malaise/fatigue and weight loss. Respiratory: Negative. Cardiovascular: Negative for chest pain, palpitations and leg swelling. Gastrointestinal: Positive for constipation. Negative for abdominal pain, heartburn, nausea and vomiting. Musculoskeletal: Positive for back pain. Skin: Positive for itching and rash. Neurological: Negative for dizziness and headaches. Psychiatric/Behavioral: Negative for depression. The patient is nervous/anxious. Objective:     General: alert, cooperative, no distress   Mental  status: mental status: alert, oriented to person, place, and time, normal mood, behavior, speech, dress, motor activity, and thought processes   Resp: resp: normal effort and no respiratory distress   Neuro: neuro: no gross deficits   Skin: skin: no discoloration or lesions of concern on visible areas     Due to this being a TeleHealth evaluation, many elements of the physical examination are unable to be assessed. Assessment & Plan:   1. Type 2 diabetes mellitus with diabetic polyneuropathy, without long-term current use of insulin ([address]  - controlled  - continue Janumet once a day and metformin 500mg BID  - HEMOGLOBIN A1C WITH EAG; Future  - METABOLIC PANEL, BASIC; Future    2. Essential hypertension  - controlled  - METABOLIC PANEL, BASIC; Future  - simvastatin (ZOCOR) 10 mg tablet; Take 1 Tab by mouth nightly. Dispense: 90 Tab; Refill: 3  - losartan (COZAAR) 100 mg tablet; Take 1 Tab by mouth daily. Dispense: 90 Tab; Refill: 3    3. Hypercholesterolemia  - controlled    4. Anxiety and depression  - stable  - psych follow-up    5. Acquired hypothyroidism  - stable    6. Senile osteoporosis  - continue Fosamax and calcium-vit D    7. 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 Dispense: 6 Tab;  Refill: 0      Follow-up and Dispositions    · Return in about 3 months (around 4/22/2021) for ROV, in-person. We discussed the expected course, resolution and complications of the diagnosis(es) in detail. Medication risks, benefits, costs, interactions, and alternatives were discussed as indicated. I advised her to contact the office if her condition worsens, changes or fails to improve as anticipated. She expressed understanding with the diagnosis(es) and plan. Pursuant to the emergency declaration under the [address] [place] [address], [facility] waiver authority and the [employer] and [employer] Act, this Virtual  Visit was conducted, with patient's consent, to reduce the patient's risk of exposure to COVID-19 and provide continuity of care for an established patient. Services were provided through a video synchronous discussion virtually to substitute for in-person clinic visit.     Viola Croft MD

## 2021-01-25 ENCOUNTER — IMPORTED ENCOUNTER (OUTPATIENT)
Dept: URBAN - METROPOLITAN AREA CLINIC 1 | Facility: CLINIC | Age: 81
End: 2021-01-25

## 2021-01-25 ENCOUNTER — TELEPHONE (OUTPATIENT)
Dept: FAMILY MEDICINE CLINIC | Age: 81
End: 2021-01-25

## 2021-01-25 PROBLEM — Z79.84: Noted: 2021-01-25

## 2021-01-25 PROBLEM — H16.143: Noted: 2021-01-25

## 2021-01-25 PROBLEM — Z96.1: Noted: 2021-01-25

## 2021-01-25 PROBLEM — E11.9: Noted: 2021-01-25

## 2021-01-25 PROBLEM — H40.023: Noted: 2021-01-25

## 2021-01-25 PROBLEM — H04.123: Noted: 2021-01-25

## 2021-01-25 PROCEDURE — 92015 DETERMINE REFRACTIVE STATE: CPT

## 2021-01-25 PROCEDURE — 92250 FUNDUS PHOTOGRAPHY W/I&R: CPT

## 2021-01-25 PROCEDURE — 92004 COMPRE OPH EXAM NEW PT 1/>: CPT

## 2021-01-25 NOTE — PATIENT DISCUSSION
1.  DM Type II (Oral Med) without sign of diabetic retinopathy and no blot heme on dilated retinal examination today OU No Macular Edema:  Discussed the pathophysiology of diabetes and its effect on the eye and risk of blindness. Stressed the importance of strong glucose control. Advised of importance of at least yearly dilated examinations but to contact us immediately for any problems or concerns. 2. Glaucoma Suspect OU (CD 0.8 OU) IOP 12 OU no gtts. Negative family Hx. Disc photos today showing cupping OU. Patient is considered high risk. Condition was discussed with patient and patient understands. Will continue to monitor patient for any progression in condition. Patient was advised to call us with any problems questions or concerns. 3.  JASMINA w/ PEK OU -- Recommend begin the routine use of ATs TID-QID OU. Sample Refresh Relieva and Lake-3 given. 4. Pseudophakia OU -- Toric OU (PM[facility]). Finalized glasses Mrx today. Return for an appointment in 6 months for a 10/OCT/HVF (and k check) with Dr. Messi Adams.

## 2021-01-26 ENCOUNTER — HOSPITAL ENCOUNTER (OUTPATIENT)
Dept: PHYSICAL THERAPY | Age: 81
Discharge: HOME | End: 2021-01-26
Payer: MEDICARE

## 2021-01-26 PROCEDURE — 97530 THERAPEUTIC ACTIVITIES: CPT

## 2021-01-26 PROCEDURE — 97110 THERAPEUTIC EXERCISES: CPT

## 2021-01-26 NOTE — PROGRESS NOTES
PHYSICAL THERAPY - DAILY TREATMENT NOTE    Patient Name: Aaliyah Chun        Date: 2021  : 1940   yes Patient  Verified  Visit #:      8+  Insurance: Payor: Luciano Barnett / Plan: [address] [place] [address] HMO / Product Type: Managed Care Medicare /      In time: 246 Out time: 336   Total Treatment Time: 50     Medicare/BCBS Time Tracking (below)   Total Timed Codes (min):   40 1:1 Treatment Time:  40     TREATMENT AREA =  Low back pain [M54.5]    SUBJECTIVE  Pain Level (on 0 to 10 scale):  5  / 10   Medication Changes/New allergies or changes in medical history, any new surgeries or procedures?    no  If yes, update Summary List   Subjective Functional Status/Changes:  []  No changes reported     \"I reached for something on the floor in a weird position and it made my upper back hurt. Its a 6/10 there.  I am usually a 5/10 in the lower back\"    50% better overall since SOC    3 days out of the week radic sxs are abolished     OBJECTIVE  Modalities Rationale:     decrease pain to improve patient's ability to safely perform ADLs/ bending/stooping/ lifting/prolong sitting, stding and amb/ stairs with minimal to no pain     min [] Estim, type/location:                                      []  att     []  unatt     []  w/US     []  w/ice    []  w/heat    min []  Mechanical Traction: type/lbs                   []  pro   []  sup   []  int   []  cont    []  before manual    []  after manual    min []  Ultrasound, settings/location:      min []  Iontophoresis w/ dexamethasone, location:                                               []  take home patch       []  in clinic   10 min []  Ice     [x]  Heat    location/position: Supine with wedge under B LEs    min []  Vasopneumatic Device, press/temp:     min []  Other:    [] Skin assessment post-treatment (if applicable):    []  intact    []  redness- no adverse reaction     []redness  adverse reaction:         30 min Therapeutic Exercise:  [x]  See flow sheet   Rationale:      increase ROM, increase strength and improve coordination to improve the patients ability to safely perform ADLs, bending/stooping/ lifting; prolong sitting, standing and ambulation; and negotiate stairs with minimal to no pain     10 min Therapeutic Activity: [x]  See flow sheet  FOTO assessment  stair negotiation  sit<>stand test   Rationale:    increase ROM, increase strength and improve coordination to improve the patients ability to safely perform ADLs, bending/stooping/ lifting;prolong sitting, standing and ambulation; and negotiate stairs with minimal to no pain      Billed With/As:   [x] TE   [x] TA   [] Neuro   [] Self Care Patient Education: [x] Review HEP    [] Progressed/Changed HEP based on:   [x] positioning   [x] body mechanics   [x] transfers   [x] heat/ice application    [x] other: Pt ed on importance and benefits of compliance with HEP, core strength/stability and proper posture; pt verbalized understanding  See updated HEP in chart       Other Objective/Functional Measures:    VCs + demo to perform proper technique for TE  Initiated stair neg without c/o p!  (I) negotiating up and down 3 steps x 4  with B HandRails safely with reciprocal pattern  5x sit<>stand test : 11'', 14'', 9''= 11.3\"   Post Treatment Pain Level (on 0 to 10) scale:   5/ 10     ASSESSMENT  Assessment/Changes in Function:   See PN     []  See Progress Note/Recertification   Patient will continue to benefit from skilled PT services to modify and progress therapeutic interventions, address functional mobility deficits, address ROM deficits, address strength deficits, analyze and address soft tissue restrictions, analyze and cue movement patterns, analyze and modify body mechanics/ergonomics, assess and modify postural abnormalities and instruct in home and community integration to attain remaining goals.    Progress toward goals / Updated goals:  See PN       PLAN  [x]  Upgrade activities as tolerated yes Continue plan of care   []  Discharge due to :    []  Other: Pt will benefit from further skilled PT services 2-3x wk x 4 wks to increase strength/stability and decrease sxs to promote functional mobility.      Therapist: Jordy Leon PTA    Date: 1/26/2021 Time: 2:57 PM     Future Appointments   Date Time Provider Alex Monte   1/28/2021  2:45 PM Amira Terry, PT [facility]   4/22/2021 11:00 AM DMA, LAB [facility] BS AMB   4/26/2021 11:30 AM Rivas Barajas MD [facility] BS AMB

## 2021-01-26 NOTE — PROGRESS NOTES
[facility] PHYSICAL THERAPY  [address] [facility] [place], [address] - Phone: [phone]  Fax: [phone]  Amy          Patient Name: Debbie Funes : 1940   Treatment/Medical Diagnosis: Low back pain [M54.5]   Onset Date: chronic    Referral Source: Lin Roman MD Start of Care [facility]): 1940   Prior Hospitalization: See Medical History Provider #: [id number]   Prior Level of Function: Chronic pain, independent with ADL's   Comorbidities: DM, Osteoporosis, Depression, OA, Thyroid, HTN, CA, Scoliosis   Medications: Verified on Patient Summary List   Visits from [facility]: 2020 Missed Visits: 1     Goal/Measure of Progress Goal Met? 1. Patient to be Safe and Independent with HEP to self-manage/prevent symptoms after DC. Status at last Eval: Established HEP Current Status: compliant with HEP progressing   2. Patient to increase FS FOTO score to > 55 to indicate increased functional independence   Status at last Eval: 43 Current Status: 67 yes   3. Patient to demonstrate safe stair negotiation with minimal complaints of LBP   Status at last Eval: unable  Current Status: safe with no p! yes     Key Functional Changes/Progress: Jeanelle Goodell  is progressing towards goals in PT for LBP as evidence by  progressing towards (I) with HEP, and improving functional mobility. Kate Ask reports compliance with HEP, and requires moderate  instructions to perform TE in clinic properly. Pt reports max p! 6/10, min p! 0/10. Pt reports she is limited to carrying \"~ 10# of groceries\". (I) negotiating up and down 3 steps x 4  with [employer] safely with reciprocal pattern; however, reports she feels unstable. pt reports she is limited to 10 mins of walking and 15 mins of standing. Pt reports 50% overall improvement since SOC, and maybe 3 days a week with intermittent Right LE radic sxs.  Pt is (I) managing sxs with repeated/prolong L/s movement (MDT/Belia Method). Pt performed 5 x sit<>stand test at 11.3 secs, indicating decrease fall risk. Pt increased FOTO from 43 to 79;  indicating improved functional mobility  Problem List: pain affecting function, decrease ROM, decrease strength, edema affecting function, impaired gait/ balance, decrease ADL/ functional abilitiies, decrease activity tolerance, decrease flexibility/ joint mobility and decrease transfer abilities   Treatment Plan may include any combination of the following: Therapeutic exercise, Therapeutic activities, Neuromuscular re-education, Physical agent/modality, Gait/balance training, Manual therapy, Patient education, Self Care training, Functional mobility training, Home safety training and Stair training  Patient Goal(s) has been updated and includes:      Goals for this certification period include and are to be achieved in   4  weeks:  1. Patient to be Safe and Independent with HEP to self-manage/prevent symptoms after DC. 2. Patient to demonstrate safe stair negotiation with 1 UE assist.   3. Pt to demo proper lifting tech with no increase in pain to allow pt to carry groceries safely. Frequency / Duration:   Patient to be seen  2-3   times per week for   4    weeks:    Assessments/Recommendations: Pt will benefit from further skilled PT services to increase strength/stability and decrease sxs to promote functional mobility. If you have any questions/comments please contact us directly at [phone]. Thank you for allowing us to assist in the care of your patient. Therapist Signature: RADHA Wakefield \"BJ\" Konrad Templeton, DPCHRISTY, Cert. MDT, Cert. DN, Cert. SMT, Dip. Osteopractic Date: 7/01/1501   Certification Period:  Reporting Period: 12/21/2020-3/20/21  12/21/2020-1/26/21 Time: 2:57 PM   NOTE TO PHYSICIAN:  PLEASE COMPLETE THE ORDERS BELOW AND FAX TO   [facility] Physical Therapy: [phone].   If you are unable to process this request in 24 hours please contact our office: [phone].    ___ I have read the above report and request that my patient continue as recommended.   ___ I have read the above report and request that my patient continue therapy with the following changes/special instructions: ________________________________________________   ___ I have read the above report and request that my patient be discharged from therapy.     Physician Signature:        Date:       Time:

## 2021-01-28 ENCOUNTER — HOSPITAL ENCOUNTER (OUTPATIENT)
Dept: PHYSICAL THERAPY | Age: 81
Discharge: HOME | End: 2021-01-28
Payer: MEDICARE

## 2021-01-28 PROCEDURE — 97140 MANUAL THERAPY 1/> REGIONS: CPT

## 2021-01-28 PROCEDURE — 97110 THERAPEUTIC EXERCISES: CPT

## 2021-01-28 NOTE — PROGRESS NOTES
PHYSICAL THERAPY - DAILY TREATMENT NOTE    Patient Name: Tracy Oconnor        Date: 2021  : 1940   YES Patient  Verified  Visit #:   8   of   8  Insurance: Payor: Gaudencio Guevara / Plan: [address] [place] [address] HMO / Product Type: Managed Care Medicare /      In time: 2:45 Out time: 3:45   Total Treatment Time: 60     Medicare/BCBS [place] Time Tracking (below)   Total Timed Codes (min):  50 1:1 Treatment Time:  50     TREATMENT AREA =  Low back     SUBJECTIVE    Pain Level (on 0 to 10 scale):  4   10   Medication Changes/New allergies or changes in medical history, any new surgeries or procedures? NO    If yes, update Summary List   Subjective Functional Status/Changes:  []  No changes reported     \"I feel good today. I was able to scrape the snow off my car this morning. Lower back pain continues to extend to back of my left leg\".           OBJECTIVE    Modalities Rationale:    decrease pain and increase tissue extensibility to improve patient's ability to decrease pain to improve patient's ability to safely perform ADLs/ bending/stooping/ lifting/prolong sitting, stding and amb/ stairs with minimal to no pain   min [] Estim, type/location:                                      []  att     []  unatt     []  w/US     []  w/ice    []  w/heat    min []  Mechanical Traction: type/lbs                   []  pro   []  sup   []  int   []  cont    []  before manual    []  after manual    min []  Ultrasound, settings/location:      min []  Iontophoresis w/ dexamethasone, location:                                               []  take home patch-6hour remove at        []  in clinic   10 min []  Ice     [x]  Heat    location/position: l spine post tx supine    min []  Vasopneumatic Device, press/temp:     min []  Other:    [x] Skin assessment post-treatment (if applicable):    [x]  intact    []  redness- no adverse reaction     []redness  adverse reaction:      30 min Therapeutic Exercise:  [x]  See flow sheet   Rationale:      increase ROM, increase strength and improve balance improve the patients ability to safely perform ADLs, bending/stooping/ lifting; prolong sitting, standing and ambulation; and negotiate stairs with minimal to no pain      10 min Therapeutic Activity: Common lunge matrix, sit to stands, toe taps on stair   Rationale:   increase ROM, increase strength and improve coordination to improve the patients ability to safely perform ADLs, bending/stooping/ lifting;prolong sitting, standing and ambulation; and negotiate stairs with minimal to no pain  10 min Manual Therapy: Astym to thoracic and lumbar spine to increase blood flow aide in tissue mobility and reduce pt pain levels   Rationale:      decrease pain to improve patient's ability to perform ADLs with less pain. The manual therapy interventions were performed at a separate and distinct time from the therapeutic activities interventions     min Patient Education:  YES  Reviewed HEP   []  Progressed/Changed HEP based on: Other Objective/Functional Measures:    Included common lunge matrix to mimic daily activities and to challenge patient's balance. Tolerated addition of Astym treatment well without c/o     Post Treatment Pain Level (on 0 to 10) scale:   3  / 10     ASSESSMENT    Assessment/Changes in Function:     Pt appears to demonstrate good progress towards goals at this time. Plan to cont with POC      []  See Progress Note/Recertification   Patient will continue to benefit from skilled PT services to analyze, cue, progress, modify,, demonstrate, instruct, and address, movement patterns, therapeutic interventions, postural abnormalities, soft tissue restrictions, ROM, strength, functional mobility, body mechanics/ergonomics, and home and community integration, to attain remaining goals.    Progress toward goals / Updated goals:  Standing tolerance improving      PLAN    []  Upgrade activities as tolerated YES Continue plan of care   []  Discharge due to :    []  Other:      Therapist: Amrit Crocker, PT, DPT    Date: 1/28/2021 Time: 3:14 PM     Future Appointments   Date Time Provider Alex Monte   2/2/2021  2:45 PM Kelly Dennison PTA [facility]   2/4/2021  2:45 PM Paulina Flores, PT [facility]   2/9/2021  2:45 PM Kelly Dennison PTA [facility]   2/11/2021  2:45 PM Paulina Flores, PT [facility]   2/16/2021  2:45 PM Pradeep Oregon [facility]   2/18/2021  2:45 PM Paulina Flores, PT [facility]   2/23/2021  2:45 PM Kelly Dennison PTA [facility]   2/25/2021  2:45 PM Paulina Flores, PT [facility]   4/22/2021 11:00 AM DMA, LAB [facility] BS AMB   4/26/2021 11:30 AM MD SHELLIE Trujillo AMB

## 2021-02-02 ENCOUNTER — HOSPITAL ENCOUNTER (OUTPATIENT)
Dept: PHYSICAL THERAPY | Age: 81
Discharge: HOME | End: 2021-02-02
Payer: MEDICARE

## 2021-02-02 PROCEDURE — 97110 THERAPEUTIC EXERCISES: CPT

## 2021-02-02 PROCEDURE — 97530 THERAPEUTIC ACTIVITIES: CPT

## 2021-02-02 NOTE — PROGRESS NOTES
PHYSICAL THERAPY - DAILY TREATMENT NOTE    Patient Name: Nikki Rodriguez        Date: 2021  : 1940   yes Patient  Verified  Visit #:     Insurance: Payor: Rivas Crocker / Plan: [address] [place] [address] HMO / Product Type: Managed Care Medicare /      In time: 669 Out time: 346   Total Treatment Time: 59     Medicare/BCBS Time Tracking (below)   Total Timed Codes (min):  49 1:1 Treatment Time:  49     TREATMENT AREA =  Low back pain [M54.5]    SUBJECTIVE  Pain Level (on 0 to 10 scale): 4 / 10   Medication Changes/New allergies or changes in medical history, any new surgeries or procedures?    no  If yes, update Summary List   Subjective Functional Status/Changes:  []  No changes reported     \"Its hard tapping up without UE\"     OBJECTIVE  Modalities Rationale:     decrease pain to improve patient's ability to safely perform ADLs/ bending/stooping/ lifting/prolong sitting, stding and amb/ stairs with minimal to no pain     min [] Estim, type/location:                                      []  att     []  unatt     []  w/US     []  w/ice    []  w/heat    min []  Mechanical Traction: type/lbs                   []  pro   []  sup   []  int   []  cont    []  before manual    []  after manual    min []  Ultrasound, settings/location:      min []  Iontophoresis w/ dexamethasone, location:                                               []  take home patch       []  in clinic   10 min []  Ice     [x]  Heat    location/position: Supine with wedge under B LEs    min []  Vasopneumatic Device, press/temp:     min []  Other:    [] Skin assessment post-treatment (if applicable):    []  intact    []  redness- no adverse reaction     []redness  adverse reaction:        34 min Therapeutic Exercise:  [x]  See flow sheet   Rationale:      increase ROM, increase strength and improve coordination to improve the patients ability to safely perform ADLs, bending/stooping/ lifting; prolong sitting, standing and ambulation; and negotiate stairs with minimal to no pain     15 min Therapeutic Activity: [x]  See flow sheet  tap ups  step ups  sit<>stand   lunge   Rationale:    increase ROM, increase strength and improve coordination to improve the patients ability to safely perform ADLs, bending/stooping/ lifting;prolong sitting, standing and ambulation; and negotiate stairs with minimal to no pain      Billed With/As:   [x] TE   [x] TA   [] Neuro   [] Self Care Patient Education: [x] Review HEP    [] Progressed/Changed HEP based on:   [x] positioning   [x] body mechanics   [x] transfers   [x] heat/ice application    [x] other: Pt ed on importance and benefits of compliance with HEP, core strength/stability and proper posture; pt verbalized understanding  See updated HEP in chart       Other Objective/Functional Measures:    VCs + demo to perform proper technique for TE    Initiated step ups without c/o p!     tap ups without UE assist, 1 LOB on SL LLE with (I) to recover     Post Treatment Pain Level (on 0 to 10) scale:   3 / 10     ASSESSMENT  Assessment/Changes in Function:   Progressed there-ex without c/o increase p!  requires 1 UE for lunge   1 UE assist with step ups; progressing towards LTG #2   []  See Progress Note/Recertification   Patient will continue to benefit from skilled PT services to modify and progress therapeutic interventions, address functional mobility deficits, address ROM deficits, address strength deficits, analyze and address soft tissue restrictions, analyze and cue movement patterns, analyze and modify body mechanics/ergonomics, assess and modify postural abnormalities and instruct in home and community integration to attain remaining goals. Progress toward goals / Updated goals:  · Goals for this certification period include and are to be achieved in   4  weeks from 1/28/21:  1. Patient to be Safe and Independent with HEP to self-manage/prevent symptoms after DC.   2. Patient to demonstrate safe stair negotiation with 1 UE assist. progressing performed 6'' step ups with 1 UE assist    3. Pt to demo proper lifting tech with no increase in pain to allow pt to carry groceries safely.        PLAN  [x]  Upgrade activities as tolerated yes Continue plan of care   []  Discharge due to :    []  Other:      Therapist: Alba Hodgson PTA    Date: 2/2/2021 Time: 2:57 PM     Future Appointments   Date Time Provider Alex Monte   2/4/2021  2:45 PM Aidenrolo Jameson, PT [facility]   2/9/2021  2:45 PM Rasheed Osgood, PTA [facility]   2/11/2021  2:45 PM Aiden Gisell, PT [facility]   2/16/2021  2:45 PM Rasheed Osgood, PTA [facility]   2/18/2021  2:45 PM Aiden Gisell, PT [facility]   2/23/2021  2:45 PM Rasheed Osgood, PTA [facility]   2/25/2021  2:45 PM Aiden Gisell, PT [facility]   4/22/2021 11:00 AM DMA, LAB [facility] BS AMB   4/26/2021 11:30 AM MD SHELLIE Lindsey BS AMB

## 2021-02-04 ENCOUNTER — HOSPITAL ENCOUNTER (OUTPATIENT)
Dept: PHYSICAL THERAPY | Age: 81
Discharge: HOME | End: 2021-02-04
Payer: MEDICARE

## 2021-02-04 PROCEDURE — 97110 THERAPEUTIC EXERCISES: CPT

## 2021-02-09 ENCOUNTER — HOSPITAL ENCOUNTER (OUTPATIENT)
Dept: PHYSICAL THERAPY | Age: 81
Discharge: HOME | End: 2021-02-09
Payer: MEDICARE

## 2021-02-09 PROCEDURE — 97110 THERAPEUTIC EXERCISES: CPT

## 2021-02-09 PROCEDURE — 97530 THERAPEUTIC ACTIVITIES: CPT

## 2021-02-09 NOTE — PROGRESS NOTES
PHYSICAL THERAPY - DAILY TREATMENT NOTE    Patient Name: Katlyn Gillespie        Date: 2021  : 1940   yes Patient  Verified  Visit #:     Insurance: Payor: Saud Armas / Plan: [address] [place] [address] HMO / Product Type: Managed Care Medicare /      In time: 073 Out time: 345   Total Treatment Time: 58     Medicare/BCBS Time Tracking (below)   Total Timed Codes (min):  48 1:1 Treatment Time:  48     TREATMENT AREA =  Low back pain [M54.5]    SUBJECTIVE  Pain Level (on 0 to 10 scale): 4 / 10   Medication Changes/New allergies or changes in medical history, any new surgeries or procedures?    no  If yes, update Summary List   Subjective Functional Status/Changes:  []  No changes reported     \"I am ok, I am working on my exercises at home\"     OBJECTIVE  Modalities Rationale:     decrease pain to improve patient's ability to safely perform ADLs/ bending/stooping/ lifting/prolong sitting, stding and amb/ stairs with minimal to no pain     min [] Estim, type/location:                                      []  att     []  unatt     []  w/US     []  w/ice    []  w/heat    min []  Mechanical Traction: type/lbs                   []  pro   []  sup   []  int   []  cont    []  before manual    []  after manual    min []  Ultrasound, settings/location:      min []  Iontophoresis w/ dexamethasone, location:                                               []  take home patch       []  in clinic   10 min []  Ice     [x]  Heat    location/position: Supine with wedge under B LEs    min []  Vasopneumatic Device, press/temp:     min []  Other:    [x] Skin assessment post-treatment (if applicable):    [x]  intact    []  redness- no adverse reaction     []redness  adverse reaction:         22 min Therapeutic Exercise:  [x]  See flow sheet   Rationale:      increase ROM, increase strength and improve coordination to improve the patients ability to safely perform ADLs, bending/stooping/ lifting; prolong sitting, standing and ambulation; and negotiate stairs with minimal to no pain     26 min Therapeutic Activity: [x]  See flow sheet  tap ups  step ups  sit<>stand   seated piriformis str for donning/doffing shoes and socks with ease  bridges for bed mobility   Rationale:    increase ROM, increase strength and improve coordination to improve the patients ability to safely perform ADLs, bending/stooping/ lifting;prolong sitting, standing and ambulation; and negotiate stairs with minimal to no pain      Billed With/As:   [x] TE   [x] TA   [] Neuro   [] Self Care Patient Education: [x] Review HEP    [] Progressed/Changed HEP based on:   [x] positioning   [x] body mechanics   [x] transfers   [x] heat/ice application    [x] other: Pt ed on importance and benefits of compliance with HEP, core strength/stability and proper posture; pt verbalized understanding     Other Objective/Functional Measures:    VCs + demo to perform proper technique for TE    Initiated bridges, and AE to hip 3 way without c/o p!    demos fair bridge form with min discomfort  no LOB with tap ups on 6''   Post Treatment Pain Level (on 0 to 10) scale:   3 / 10     ASSESSMENT  Assessment/Changes in Function:   Progressed there-ex without c/o increase p!  1 UE assist with hip 3 way on AE  demos good quad control with step ups   []  See Progress Note/Recertification   Patient will continue to benefit from skilled PT services to modify and progress therapeutic interventions, address functional mobility deficits, address ROM deficits, address strength deficits, analyze and address soft tissue restrictions, analyze and cue movement patterns, analyze and modify body mechanics/ergonomics, assess and modify postural abnormalities and instruct in home and community integration to attain remaining goals. Progress toward goals / Updated goals:  · Goals for this certification period include and are to be achieved in   4  weeks from 1/28/21:  1.  Patient to be Safe and Independent with HEP to self-manage/prevent symptoms after DC. 2. Patient to demonstrate safe stair negotiation with 1 UE assist. progressing performed 6'' step ups with 1 UE assist    3. Pt to demo proper lifting tech with no increase in pain to allow pt to carry groceries safely.        PLAN  [x]  Upgrade activities as tolerated yes Continue plan of care   []  Discharge due to :    []  Other:      Therapist: Thai Minor PTA    Date: 2/9/2021 Time: 2:57 PM     Future Appointments   Date Time Provider Alex Monte   2/11/2021  2:45 PM Gagan Gutierrez, PT [facility]   2/16/2021  2:45 PM Thelma Urbano PTA [facility]   2/18/2021  2:45 PM Gagan Gutierrez, PT [facility]   2/23/2021  2:45 PM Thelma Urbano PTA [facility]   2/25/2021  2:45 PM Gagan Gutierrez, PT [facility]   4/22/2021 11:00 AM DMA, LAB [facility] BS AMB   4/26/2021 11:30 AM Jack Barajas MD [facility] BS AMB

## 2021-02-11 ENCOUNTER — HOSPITAL ENCOUNTER (OUTPATIENT)
Dept: PHYSICAL THERAPY | Age: 81
Discharge: HOME | End: 2021-02-11
Payer: MEDICARE

## 2021-02-11 PROCEDURE — 97110 THERAPEUTIC EXERCISES: CPT

## 2021-02-11 NOTE — PROGRESS NOTES
PHYSICAL THERAPY - DAILY TREATMENT NOTE    Patient Name: Shaylee Ray        Date: 2021  : 1940   YES Patient  Verified  Visit #:     Insurance: Payor: Hao Ray / Plan: [address] [place] [address] HMO / Product Type: Managed Care Medicare /      In time: 2:32early Out time: 3:25   Total Treatment Time: 53     Medicare/BCBS Time Tracking (below)   Total Timed Codes (min):  53 1:1 Treatment Time:  43     TREATMENT AREA = Low back pain [M54.5]    SUBJECTIVE    Pain Level (on 0 to 10 scale):  4  / 10   Medication Changes/New allergies or changes in medical history, any new surgeries or procedures? NO    If yes, update Summary List   Subjective Functional Status/Changes:  []  No changes reported     \"I'm doing better with my moving around. \"          OBJECTIVE     Therapeutic Procedures:  Min Procedure Specifics + Rationale   n/a [x]  Patient Education (performed throughout session) [x] Review HEP    [] Progressed/Changed HEP based on:   [] proper performance and advancement of Therex/TA   [] reduction in pain level    [] increased functional capacity       [] change in directional preference   43 [x] Therapeutic Exercise   [x]  See Flowsheet   Rationale: increase ROM and increase strength to improve the patients ability to participate in ADL's      Modality rationale: decrease inflammation, decrease pain, increase tissue extensibility and increase muscle contraction/control to improve the patients ability to perform ADL's with greater ease     Min Type Additional Details   10 [x]  Heat         [] pre-KEO      [x] post-KEO Location:L/S    [] supine              [] prone     [] legs elevated    [] legs flat   [] sitting   [x] Skin assessment post-treatment:  [x]intact [x]redness- no adverse reaction       []redness  adverse reaction:     Other Objective/Functional Measures:    Increased reps/sets/resistance per flow sheet.        Post Treatment Pain Level (on 0 to 10) scale:   0 / 10     ASSESSMENT    Assessment/Changes in Function:       Tolerated treatment well without complaints of progression, indicating improved functional capacity for ADL's. Patient will continue to benefit from skilled PT services to modify and progress therapeutic interventions, address functional mobility deficits, address ROM deficits, address strength deficits, analyze and address soft tissue restrictions, analyze and cue movement patterns, analyze and modify body mechanics/ergonomics and instruct in home and community integration  to attain remaining goals   Progress toward goals / Updated goals:    Progressing in activity tolerance towards ADL's     PLAN    [x]  Upgrade activities as tolerated  [x]  Update interventions per flow sheet YES Continue plan of care   []  Discharge due to :    []  Other:      Therapist: Nelda Landon \"BJ\" Luisito, KIMBERLY, Cert. MDT, Cert. DN, Cert. SMT, Dip.  Osteopractic    Date: 2/11/2021 Time: 2:24 PM     Future Appointments   Date Time Provider Alex Monte   2/11/2021  2:45 PM Anna Hyman, PT [facility]   2/16/2021  2:45 PM Shadi Sanchez PTA [facility]   2/18/2021  2:45 PM Anna Hyman, PT [facility]   2/23/2021  2:45 PM Shadi Sanchez PTA [facility]   2/25/2021  2:45 PM Anna Hyman, PT [facility]   4/22/2021 11:00 AM DMA, LAB [facility] BS AMB   4/26/2021 11:30 AM Vineet Barajas MD [facility] BS AMB

## 2021-02-16 ENCOUNTER — HOSPITAL ENCOUNTER (OUTPATIENT)
Dept: PHYSICAL THERAPY | Age: 81
Discharge: HOME | End: 2021-02-16
Payer: MEDICARE

## 2021-02-16 PROCEDURE — 97110 THERAPEUTIC EXERCISES: CPT

## 2021-02-16 PROCEDURE — 97530 THERAPEUTIC ACTIVITIES: CPT

## 2021-02-16 NOTE — PROGRESS NOTES
PHYSICAL THERAPY - DAILY TREATMENT NOTE    Patient Name: Lynn Ji        Date: 2021  : 1940   yes Patient  Verified  Visit #:   15   of   19  Insurance: Payor: Cassandra Catad / Plan: [address] [place] [address] HMO / Product Type: Managed Care Medicare /      In time: 074 Out time: 357   Total Treatment Time: 72     Medicare/BCBS Time Tracking (below)   Total Timed Codes (min):   62 1:1 Treatment Time:  52     TREATMENT AREA =  Low back pain [M54.5]    SUBJECTIVE  Pain Level (on 0 to 10 scale): 5/ 10   Medication Changes/New allergies or changes in medical history, any new surgeries or procedures?    no  If yes, update Summary List   Subjective Functional Status/Changes:  []  No changes reported     \"I woke up with more pain on the left side, up some from the L/s.  I had to reach further the night before\"     OBJECTIVE  Modalities Rationale:     decrease pain to improve patient's ability to safely perform ADLs/ bending/stooping/ lifting/prolong sitting, stding and amb/ stairs with minimal to no pain     min [] Estim, type/location:                                      []  att     []  unatt     []  w/US     []  w/ice    []  w/heat    min []  Mechanical Traction: type/lbs                   []  pro   []  sup   []  int   []  cont    []  before manual    []  after manual    min []  Ultrasound, settings/location:      min []  Iontophoresis w/ dexamethasone, location:                                               []  take home patch       []  in clinic   10 min []  Ice     [x]  Heat    location/position: Supine with wedge under B LEs    min []  Vasopneumatic Device, press/temp:     min []  Other:    [x] Skin assessment post-treatment (if applicable):    [x]  intact    []  redness- no adverse reaction     []redness  adverse reaction:        32 min Therapeutic Exercise:  [x]  See flow sheet   Rationale:      increase ROM, increase strength and improve coordination to improve the patients ability to safely perform ADLs, bending/stooping/ lifting; prolong sitting, standing and ambulation; and negotiate stairs with minimal to no pain     30 min Therapeutic Activity: [x]  See flow sheet  cone tap   star taps  step ups  sit<>stand   seated piriformis str for donning/doffing shoes and socks with ease  bridges for bed mobility   Rationale:    increase ROM, increase strength and improve coordination to improve the patients ability to safely perform ADLs, bending/stooping/ lifting;prolong sitting, standing and ambulation; and negotiate stairs with minimal to no pain      Billed With/As:   [x] TE   [x] TA   [] Neuro   [] Self Care Patient Education: [x] Review HEP    [] Progressed/Changed HEP based on:   [x] positioning   [x] body mechanics   [x] transfers   [x] heat/ice application    [x] other: Pt ed on importance and benefits of compliance with HEP, core strength/stability and proper posture; pt verbalized understanding     Other Objective/Functional Measures:  5/10 p! in.  Instructed to perform RFISitting - B/B reduced pain to 3/10     VCs + demo to perform proper technique for TE    Initiated cone taps without 1 UE assist without LOB, pt able to  cone she knocked over (I) without safety issues    (I) with farmers carry x 60' ea UE with 5# KB/2 #KB   Post Treatment Pain Level (on 0 to 10) scale:   \"sore\" 0 / 10     ASSESSMENT  Assessment/Changes in Function:   Progressed there-ex without c/o increase p!  added 2# to farmers carry with no difficulty   demos trunk ext with bands, TCs for neutral spine   []  See Progress Note/Recertification   Patient will continue to benefit from skilled PT services to modify and progress therapeutic interventions, address functional mobility deficits, address ROM deficits, address strength deficits, analyze and address soft tissue restrictions, analyze and cue movement patterns, analyze and modify body mechanics/ergonomics, assess and modify postural abnormalities and instruct in home and community integration to attain remaining goals. Progress toward goals / Updated goals:  · Goals for this certification period include and are to be achieved in   4  weeks from 1/28/21:  1. Patient to be Safe and Independent with HEP to self-manage/prevent symptoms after DC. 2. Patient to demonstrate safe stair negotiation with 1 UE assist. progressing performed 6'' step ups with 1 UE assist    3. Pt to demo proper lifting tech with no increase in pain to allow pt to carry groceries safely.        PLAN  [x]  Upgrade activities as tolerated yes Continue plan of care   []  Discharge due to :    []  Other:      Therapist: Jordy Leon PTA    Date: 2/16/2021 Time: 12:57 PM     Future Appointments   Date Time Provider Alex Monte   2/16/2021  2:45 PM Lawanda Turcios [facility]   2/18/2021  2:45 PM Amira Terry PT [facility]   2/23/2021  2:45 PM Chris Scott PTA [facility]   2/25/2021  2:45 PM Amira Terry PT [facility]   4/22/2021 11:00 AM DMA, LAB [facility] BS AMB   4/26/2021 11:30 AM Rivas Barajas MD [facility] AMB

## 2021-02-22 ENCOUNTER — TELEPHONE (OUTPATIENT)
Dept: FAMILY MEDICINE CLINIC | Age: 81
End: 2021-02-22

## 2021-02-22 NOTE — TELEPHONE ENCOUNTER
Nurse called back for further clarification.  Calcium Heart Deposit of calcium in the arteries, somewhat knew test. Calcium Scoring test, Cardiac CT calcium scoring test.

## 2021-02-23 ENCOUNTER — APPOINTMENT (OUTPATIENT)
Dept: PHYSICAL THERAPY | Age: 81
End: 2021-02-23
Payer: MEDICARE

## 2021-02-25 ENCOUNTER — HOSPITAL ENCOUNTER (OUTPATIENT)
Dept: PHYSICAL THERAPY | Age: 81
Discharge: HOME | End: 2021-02-25
Payer: MEDICARE

## 2021-02-25 PROCEDURE — 97110 THERAPEUTIC EXERCISES: CPT

## 2021-02-25 PROCEDURE — 97530 THERAPEUTIC ACTIVITIES: CPT

## 2021-02-25 NOTE — PROGRESS NOTES
PHYSICAL THERAPY - DAILY TREATMENT NOTE    Patient Name: Aaliyah Chun        Date: 2021  : 1940   YES Patient  Verified  Visit #:   15   of   19  Insurance: Payor: Luciano Anibal / Plan: [address] [place] [address] HMO / Product Type: Managed Care Medicare /      In time: 2:35 Out time: 3:40   Total Treatment Time: 65     Medicare/BCBS Time Tracking (below)   Total Timed Codes (min):  65 1:1 Treatment Time:  55     TREATMENT AREA = Low back pain [M54.5]    SUBJECTIVE    Pain Level (on 0 to 10 scale):  4  / 10   Medication Changes/New allergies or changes in medical history, any new surgeries or procedures? NO    If yes, update Summary List   Subjective Functional Status/Changes:  []  No changes reported     \"It's been ok the past week. Been about a 4 or 5. \"          OBJECTIVE     Therapeutic Procedures:  Min Procedure Specifics + Rationale   n/a [x]  Patient Education (performed throughout session) [x] Review HEP    [] Progressed/Changed HEP based on:   [] proper performance and advancement of Therex/TA   [] reduction in pain level    [] increased functional capacity       [] change in directional preference   45(43) [x] Therapeutic Exercise   [x]  See Flowsheet   Rationale: increase ROM and increase strength to improve the patients ability to participate in ADL's    10 [x] Therapeutic Activity   [x]  See Flowsheet  Re-assessment  Rationale:  To improve safety, proprioception, coordination, and efficiency with tasks     Modality rationale: decrease inflammation, decrease pain, increase tissue extensibility and increase muscle contraction/control to improve the patients ability to perform ADL's with greater ease     Min Type Additional Details   10 [x]  Heat         [] pre-KEO      [x] post-KEO Location:L/S    [] supine              [] prone     [] legs elevated    [] legs flat   [] sitting   [x] Skin assessment post-treatment:  [x]intact [x]redness- no adverse reaction       []redness  adverse reaction:     Other Objective/Functional Measures:    See DC     Post Treatment Pain Level (on 0 to 10) scale:   0  / 10     ASSESSMENT    Assessment/Changes in Function:       See DC         Patient will continue to benefit from skilled PT services to n/a  to attain remaining goals   Progress toward goals / Updated goals:    See DC     PLAN    [x]  Upgrade activities as tolerated  [x]  Update interventions per flow sheet NO Continue plan of care   [x]  Discharge due to : Pascual Ghosh in treatment   []  Other:      Therapist: Paola Romero \"JAMIE\" KIMBERLY Jorge, Cert. MDT, Cert. DN, Cert. SMT, Dip.  Osteopractic    Date: 2/25/2021 Time: 2:46 PM     Future Appointments   Date Time Provider Alex Monte   3/19/2021  7:45 AM MD SHELLIE Salcedo BS AMB   4/22/2021 11:00 AM DMA, LAB [facility] BS AMB   4/26/2021 11:30 AM [place], Adron Gowers, MD [facility] BS AMB

## 2021-02-25 NOTE — PROGRESS NOTES
[address]  [facility] PHYSICAL THERAPY  [address] [facility] [place], [address] - Phone: [phone]  Fax: [phone] SUMMARY  Patient Name: Dominic Mccain : 1940   Treatment/Medical Diagnosis: Low back pain [M54.5]   Referral Source: Aroldo Botello MD     Date of Initial Visit: 2020 Attended Visits: 14 Missed Visits: 2     SUMMARY OF TREATMENT  Patient's POC has consisted of therex, therapeutic activities, manual therapy prn, modalities prn, pt. education, and a comprehensive HEP. Treatment strategies used to address functional mobility deficits, ROM deficits, strength deficits, analyze and address soft tissue restrictions, analyze and cue movement patterns, analyze and modify body mechanics/ergonomics, assess and modify postural abnormalities and instruct in home and community integration. CURRENT STATUS  Patient has progressed from pain levels being 7/10 on VAS at worst to 4/10 at worst. She is able to alleviate symptoms progressively to 0/10 via her therex/HEP. Patient denies any significant limitations in current ADL/s, and has reached a plateau in what our facility is able to offer. GOALS/MEASURE OF PROGRESS Goal Met? 1. Patient to be Safe and Independent with HEP to self-manage/prevent symptoms after DC. 2. Patient to demonstrate safe stair negotiation with 1 UE assist.   3. Pt to demo proper lifting tech with no increase in pain to allow pt to carry groceries safely MET      MET    MET     RECOMMENDATIONS  Discontinue therapy. Progressing towards or have reached established goals. Patient provided comprehensive HEP for DC     If you have any questions/comments please contact us directly at [phone]. Thank you for allowing us to assist in the care of your patient. Therapist Signature: Mario Dennis \"BJ\" Sylvia Haines DPT, Cert. MDT, Cert. DN, Cert. SMT, Dip.  Osteopractic Date: 2021   Reporting Period: 21-21 Certification Period 12/21/2020-3/20/21 Time: 2:48 PM     NOTE TO PHYSICIAN:  PLEASE COMPLETE THE ORDERS BELOW AND FAX TO   [facility] [place] Physical Therapy: [phone]  If you are unable to process this request in 24 hours please contact our office: [phone]    ___ I have read the above report and request that my patient continue as recommended.   ___ I have read the above report and request that my patient continue therapy with the following changes/special instructions:_________________________________________________________   ___ I have read the above report and request that my patient be discharged from therapy.      Physician Signature:        Date:     Time:

## 2021-02-26 ENCOUNTER — TELEPHONE (OUTPATIENT)
Dept: FAMILY MEDICINE CLINIC | Age: 81
End: 2021-02-26

## 2021-03-19 ENCOUNTER — TELEPHONE (OUTPATIENT)
Dept: FAMILY MEDICINE CLINIC | Age: 81
End: 2021-03-19

## 2021-03-19 DIAGNOSIS — B00.9 HERPES SIMPLEX: Primary | ICD-10-CM

## 2021-03-19 RX ORDER — VALACYCLOVIR HYDROCHLORIDE 500 MG/1
500 TABLET, FILM COATED ORAL 2 TIMES DAILY
Qty: 6 TAB | Refills: 0 | Status: SHIPPED | OUTPATIENT
Start: 2021-03-19 | End: 2021-03-22

## 2021-03-25 DIAGNOSIS — E03.9 ACQUIRED HYPOTHYROIDISM: ICD-10-CM

## 2021-03-25 DIAGNOSIS — I10 ESSENTIAL HYPERTENSION: Primary | ICD-10-CM

## 2021-03-26 RX ORDER — ATENOLOL 50 MG/1
50 TABLET ORAL DAILY
Qty: 90 TAB | Refills: 1 | Status: SHIPPED | OUTPATIENT
Start: 2021-03-26 | End: 2021-05-03 | Stop reason: SDUPTHER

## 2021-03-26 RX ORDER — LEVOTHYROXINE SODIUM 50 UG/1
50 TABLET ORAL
Qty: 90 TAB | Refills: 1 | Status: SHIPPED | OUTPATIENT
Start: 2021-03-26 | End: 2021-08-27

## 2021-03-26 NOTE — TELEPHONE ENCOUNTER
Requested Prescriptions     Pending Prescriptions Disp Refills    levothyroxine (SYNTHROID) 50 mcg tablet       Sig: Take  by mouth Daily (before breakfast).     atenoloL (TENORMIN) 50 mg tablet

## 2021-04-02 DIAGNOSIS — I10 ESSENTIAL HYPERTENSION: Primary | ICD-10-CM

## 2021-04-02 RX ORDER — PANTOPRAZOLE SODIUM 40 MG/1
40 TABLET, DELAYED RELEASE ORAL DAILY
Status: CANCELLED | OUTPATIENT
Start: 2021-04-02

## 2021-04-05 RX ORDER — AMLODIPINE BESYLATE 2.5 MG/1
2.5 TABLET ORAL DAILY
Qty: 90 TAB | Refills: 1 | Status: SHIPPED | OUTPATIENT
Start: 2021-04-05 | End: 2021-08-29

## 2021-04-28 ENCOUNTER — HOSPITAL ENCOUNTER (OUTPATIENT)
Dept: LAB | Age: 81
Discharge: HOME | End: 2021-04-28
Payer: MEDICARE

## 2021-04-28 DIAGNOSIS — I10 ESSENTIAL HYPERTENSION: ICD-10-CM

## 2021-04-28 DIAGNOSIS — E11.42 TYPE 2 DIABETES MELLITUS WITH DIABETIC POLYNEUROPATHY, WITHOUT LONG-TERM CURRENT USE OF INSULIN (HCC): ICD-10-CM

## 2021-04-28 LAB
ANION GAP SERPL CALC-SCNC: 9 MMOL/L (ref 3–18)
BUN SERPL-MCNC: 9 MG/DL (ref 7–18)
BUN/CREAT SERPL: 12 (ref 12–20)
CALCIUM SERPL-MCNC: 8.8 MG/DL (ref 8.5–10.1)
CHLORIDE SERPL-SCNC: 94 MMOL/L (ref 100–111)
CO2 SERPL-SCNC: 25 MMOL/L (ref 21–32)
CREAT SERPL-MCNC: 0.77 MG/DL (ref 0.6–1.3)
EST. AVERAGE GLUCOSE BLD GHB EST-MCNC: 134 MG/DL
GLUCOSE SERPL-MCNC: 131 MG/DL (ref 74–99)
HBA1C MFR BLD: 6.3 % (ref 4.2–5.6)
POTASSIUM SERPL-SCNC: 4.5 MMOL/L (ref 3.5–5.5)
SODIUM SERPL-SCNC: 128 MMOL/L (ref 136–145)

## 2021-04-28 PROCEDURE — 36415 COLL VENOUS BLD VENIPUNCTURE: CPT

## 2021-04-28 PROCEDURE — 83036 HEMOGLOBIN GLYCOSYLATED A1C: CPT

## 2021-04-28 PROCEDURE — 80048 BASIC METABOLIC PNL TOTAL CA: CPT

## 2021-05-03 ENCOUNTER — HOSPITAL ENCOUNTER (OUTPATIENT)
Dept: LAB | Age: 81
Discharge: HOME | End: 2021-05-03
Payer: MEDICARE

## 2021-05-03 ENCOUNTER — OFFICE VISIT (OUTPATIENT)
Dept: FAMILY MEDICINE CLINIC | Age: 81
End: 2021-05-03
Payer: MEDICARE

## 2021-05-03 VITALS
TEMPERATURE: 97.3 F | OXYGEN SATURATION: 98 % | RESPIRATION RATE: 15 BRPM | DIASTOLIC BLOOD PRESSURE: 78 MMHG | HEART RATE: 68 BPM | WEIGHT: 160 LBS | HEIGHT: 61 IN | SYSTOLIC BLOOD PRESSURE: 138 MMHG | BODY MASS INDEX: 30.21 KG/M2

## 2021-05-03 DIAGNOSIS — E87.1 HYPONATREMIA: Primary | ICD-10-CM

## 2021-05-03 DIAGNOSIS — E87.1 HYPONATREMIA: ICD-10-CM

## 2021-05-03 DIAGNOSIS — R14.0 ABDOMINAL BLOATING: ICD-10-CM

## 2021-05-03 DIAGNOSIS — K21.9 GASTROESOPHAGEAL REFLUX DISEASE, UNSPECIFIED WHETHER ESOPHAGITIS PRESENT: ICD-10-CM

## 2021-05-03 DIAGNOSIS — E78.00 HYPERCHOLESTEROLEMIA: ICD-10-CM

## 2021-05-03 DIAGNOSIS — E03.9 ACQUIRED HYPOTHYROIDISM: ICD-10-CM

## 2021-05-03 DIAGNOSIS — R07.9 CHEST PAIN, UNSPECIFIED TYPE: ICD-10-CM

## 2021-05-03 DIAGNOSIS — Z11.59 NEED FOR HEPATITIS C SCREENING TEST: ICD-10-CM

## 2021-05-03 DIAGNOSIS — E11.42 TYPE 2 DIABETES MELLITUS WITH DIABETIC POLYNEUROPATHY, WITHOUT LONG-TERM CURRENT USE OF INSULIN (HCC): ICD-10-CM

## 2021-05-03 DIAGNOSIS — F41.9 ANXIETY AND DEPRESSION: ICD-10-CM

## 2021-05-03 DIAGNOSIS — M81.0 SENILE OSTEOPOROSIS: ICD-10-CM

## 2021-05-03 DIAGNOSIS — F32.A ANXIETY AND DEPRESSION: ICD-10-CM

## 2021-05-03 DIAGNOSIS — I10 ESSENTIAL HYPERTENSION: ICD-10-CM

## 2021-05-03 LAB
ANION GAP SERPL CALC-SCNC: 8 MMOL/L (ref 3–18)
BUN SERPL-MCNC: 11 MG/DL (ref 7–18)
BUN/CREAT SERPL: 12 (ref 12–20)
CALCIUM SERPL-MCNC: 9 MG/DL (ref 8.5–10.1)
CHLORIDE SERPL-SCNC: 96 MMOL/L (ref 100–111)
CO2 SERPL-SCNC: 28 MMOL/L (ref 21–32)
CREAT SERPL-MCNC: 0.92 MG/DL (ref 0.6–1.3)
GLUCOSE SERPL-MCNC: 197 MG/DL (ref 74–99)
POTASSIUM SERPL-SCNC: 4.2 MMOL/L (ref 3.5–5.5)
SODIUM SERPL-SCNC: 132 MMOL/L (ref 136–145)
SODIUM UR-SCNC: 34 MMOL/L (ref 20–110)
TSH SERPL DL<=0.05 MIU/L-ACNC: 2.22 UIU/ML (ref 0.36–3.74)

## 2021-05-03 PROCEDURE — 1090F PRES/ABSN URINE INCON ASSESS: CPT | Performed by: INTERNAL MEDICINE

## 2021-05-03 PROCEDURE — 36415 COLL VENOUS BLD VENIPUNCTURE: CPT

## 2021-05-03 PROCEDURE — G8752 SYS BP LESS 140: HCPCS | Performed by: INTERNAL MEDICINE

## 2021-05-03 PROCEDURE — G8754 DIAS BP LESS 90: HCPCS | Performed by: INTERNAL MEDICINE

## 2021-05-03 PROCEDURE — 86803 HEPATITIS C AB TEST: CPT

## 2021-05-03 PROCEDURE — G8536 NO DOC ELDER MAL SCRN: HCPCS | Performed by: INTERNAL MEDICINE

## 2021-05-03 PROCEDURE — 84443 ASSAY THYROID STIM HORMONE: CPT

## 2021-05-03 PROCEDURE — G9717 DOC PT DX DEP/BP F/U NT REQ: HCPCS | Performed by: INTERNAL MEDICINE

## 2021-05-03 PROCEDURE — 1101F PT FALLS ASSESS-DOCD LE1/YR: CPT | Performed by: INTERNAL MEDICINE

## 2021-05-03 PROCEDURE — 83935 ASSAY OF URINE OSMOLALITY: CPT

## 2021-05-03 PROCEDURE — 84300 ASSAY OF URINE SODIUM: CPT

## 2021-05-03 PROCEDURE — G8417 CALC BMI ABV UP PARAM F/U: HCPCS | Performed by: INTERNAL MEDICINE

## 2021-05-03 PROCEDURE — G8427 DOCREV CUR MEDS BY ELIG CLIN: HCPCS | Performed by: INTERNAL MEDICINE

## 2021-05-03 PROCEDURE — 80048 BASIC METABOLIC PNL TOTAL CA: CPT

## 2021-05-03 PROCEDURE — 99214 OFFICE O/P EST MOD 30 MIN: CPT | Performed by: INTERNAL MEDICINE

## 2021-05-03 RX ORDER — ATENOLOL 50 MG/1
50 TABLET ORAL DAILY
Qty: 90 TAB | Refills: 1 | Status: SHIPPED | OUTPATIENT
Start: 2021-05-03 | End: 2021-06-16 | Stop reason: SDUPTHER

## 2021-05-03 RX ORDER — METHYLPHENIDATE HYDROCHLORIDE 20 MG/1
TABLET ORAL
COMMUNITY
Start: 2021-03-14 | End: 2022-06-07 | Stop reason: ALTCHOICE

## 2021-05-03 RX ORDER — ALENDRONATE SODIUM 70 MG/1
70 TABLET ORAL
Qty: 12 TAB | Refills: 3 | Status: SHIPPED | OUTPATIENT
Start: 2021-05-03 | End: 2022-04-18 | Stop reason: SDUPTHER

## 2021-05-03 RX ORDER — PREGABALIN 75 MG/1
75 CAPSULE ORAL DAILY
Qty: 90 CAP | Refills: 1 | Status: SHIPPED | OUTPATIENT
Start: 2021-05-03 | End: 2021-09-24 | Stop reason: SDUPTHER

## 2021-05-03 NOTE — PROGRESS NOTES
History of Present Illness  Jewel Seals Emiliana is a 68 y.o. female who presents today for management of    Chief Complaint   Patient presents with    Diabetes    Thyroid Problem    Depression       Chest Pain  Patient complains of chest pain. Onset was 1 week ago, with resolved course since that time. The patient admits to chest discomfort, rated as a scale of 5/10 in intensity that is pressure in nature. Associated symptoms are none. Aggravating factors are none. Alleviating factors are: nothing. Patient's cardiac risk factors are family history, diabetes mellitus. Previous cardiac testing includes: Electrocardiogram (EKG). Diabetes Mellitus:  The patient has diabetes, hypertension, hyperlipidemia and obesity. Diabetic ROS - medication compliance: compliant all of the time, diabetic diet compliance: compliant all of the time, further diabetic ROS: no polyuria or polydipsia, has dysesthesias in the feet. Lab review: labs are reviewed, up to date and normal.   Cardiovascular Review:  Diet and Lifestyle: generally follows a low fat low cholesterol diet, generally follows a low sodium diet  Home BP Monitoring: is not measured at home. Pertinent ROS: taking medications as instructed, no medication side effects noted, no TIA's, no chest pain on exertion, no dyspnea on exertion, no swelling of ankles. GERD  Patient complains of gastroesophageal reflux with abdominal bloating. Symptoms have been present for a few months. She denies dysphagia. She  has not lost weight. She denies melena, hematochezia, hematemesis, and coffee ground emesis. This has been associated with heartburn. Medical therapy in the past has included proton pump inhibitors.       Problem List  Patient Active Problem List    Diagnosis Date Noted    Senile osteoporosis 12/22/2020    Grade I diastolic dysfunction 86/44/5373    Type 2 diabetes mellitus with diabetic polyneuropathy, without long-term current use of insulin (HCC) 10/05/2020    Acquired hypothyroidism 10/05/2020    Anxiety and depression 10/05/2020    Hypercholesterolemia     Hypertension 07/31/2012       Current Medications  Current Outpatient Medications   Medication Sig    alendronate (FOSAMAX) 70 mg tablet Take 1 Tab by mouth every seven (7) days.  atenoloL (TENORMIN) 50 mg tablet Take 1 Tab by mouth daily.  pregabalin (Lyrica) 75 mg capsule Take 1 Cap by mouth daily. Max Daily Amount: 75 mg.    methylphenidate HCl (RITALIN) 20 mg tablet     amLODIPine (NORVASC) 2.5 mg tablet Take 1 Tab by mouth daily.  levothyroxine (SYNTHROID) 50 mcg tablet Take 1 Tab by mouth Daily (before breakfast).  simvastatin (ZOCOR) 10 mg tablet Take 1 Tab by mouth nightly.  losartan (COZAAR) 100 mg tablet Take 1 Tab by mouth daily.  Walker (Ultra-Light Rollator) misc Use as needed    pantoprazole (PROTONIX) 40 mg tablet Take 40 mg by mouth daily.  SITagliptin-metFORMIN (Janumet)  mg per tablet  mg daily.  ascorbic acid, vitamin C, (VITAMIN C) 500 mg tablet Take 500 mg by mouth daily.  oxybutynin chloride XL (DITROPAN XL) 10 mg CR tablet     True Metrix Glucose Test Strip strip     Osphena 60 mg tab tablet TAKE ONE TABLET BY MOUTH EVERY DAY WITH FOOD    metFORMIN (GLUCOPHAGE) 500 mg tablet Take 500 mg by mouth two (2) times daily (with meals).  calcium-vitamin D 600 mg(1,500mg) -200 unit tab Take 2 Tabs by mouth two (2) times a day.  TRUEplus Lancets 28 gauge misc     OLANZapine (ZyPREXA) 5 mg tablet     folic acid (FOLVITE) 564 mcg tablet Take 0.8 mg by mouth daily.  clonazePAM (KlonoPIN) 0.5 mg tablet     acetaminophen (TYLENOL EXTRA STRENGTH) 500 mg tablet Take 2 Tabs by mouth every six (6) hours as needed for Pain.  omega-3 fatty acids-vitamin e 1,000 mg cap Take 1 Cap by mouth.  multivitamin (ONE A DAY) tablet Take 1 Tab by mouth daily.  venlafaxine-SR (EFFEXOR-XR) 150 mg capsule Take  by mouth two (2) times a day.       aspirin 81 mg chewable tablet Take 81 mg by mouth daily.  cranberry extract 450 mg Tab Take  by mouth. No current facility-administered medications for this visit. Allergies/Drug Reactions  Allergies   Allergen Reactions    Codeine Itching    Pcn [Penicillins] Hives and Swelling    Meperidine Itching        Review of Systems  Review of Systems   Constitutional: Negative. Respiratory: Negative. Cardiovascular: Positive for chest pain. Negative for palpitations, orthopnea and claudication. Gastrointestinal: Positive for heartburn. Negative for abdominal pain, nausea and vomiting. Musculoskeletal: Negative. Neurological: Negative. Physical Exam  Vital signs:   Vitals:    05/03/21 0816   BP: 138/78   Pulse: 68   Resp: 15   Temp: 97.3 °F (36.3 °C)   TempSrc: Temporal   SpO2: 98%   Weight: 160 lb (72.6 kg)   Height: 5' 1\" (1.549 m)       General: alert, oriented, not in distress  Head: scalp normal, atraumatic  Eyes: pupils are equal and reactive, full and intact EOM's  Neck: supple, no JVD, no lymphadenopathy, non-palpable thyroid  Chest/Lungs: clear breath sounds, no wheezing or crackles  Heart: normal rate, regular rhythm, no murmur  Extremities: no focal deformities, no edema  Skin: no active skin lesions    Laboratory/Tests:  Lab Results   Component Value Date/Time    Hemoglobin A1c 6.3 (H) 04/28/2021 01:24 PM    Hemoglobin A1c 6.3 (H) 10/12/2020 02:56 PM    Glucose 131 (H) 04/28/2021 01:24 PM    Glucose (POC) 102 05/14/2015 02:30 PM    Microalbumin/Creat ratio (mg/g creat)  10/12/2020 02:56 PM     Cannot calculate ratio due to microalbumin result outside reportable range.     Microalbumin,urine random <0.50 10/12/2020 02:56 PM    LDL, calculated 44.6 10/12/2020 02:56 PM    Creatinine, POC 1.5 (H) 01/23/2015 02:46 PM    Creatinine 0.77 04/28/2021 01:24 PM      Lab Results   Component Value Date/Time    TSH 1.76 10/12/2020 02:56 PM    T4, Free 1.0 10/12/2020 02:56 PM      Lab Results   Component Value Date/Time    Sodium 128 (L) 04/28/2021 01:24 PM    Potassium 4.5 04/28/2021 01:24 PM    Chloride 94 (L) 04/28/2021 01:24 PM    CO2 25 04/28/2021 01:24 PM    Anion gap 9 04/28/2021 01:24 PM    Glucose 131 (H) 04/28/2021 01:24 PM    BUN 9 04/28/2021 01:24 PM    Creatinine 0.77 04/28/2021 01:24 PM    BUN/Creatinine ratio 12 04/28/2021 01:24 PM    GFR est AA >60 04/28/2021 01:24 PM    GFR est non-AA >60 04/28/2021 01:24 PM    Calcium 8.8 04/28/2021 01:24 PM         Assessment/Plan:    1. 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 Type 2 diabetes mellitus with diabetic polyneuropathy, without long-term current use of insulin (HCC)  - controlled  - pregabalin (Lyrica) 75 mg capsule; Take 1 Cap by mouth daily. Max Daily Amount: 75 mg. Dispense: 90 Cap; Refill: 1    3. Hypercholesterolemia  - controlled    4. Essential hypertension  - controlled  - atenoloL (TENORMIN) 50 mg tablet; Take 1 Tab by mouth daily. Dispense: 90 Tab; Refill: 1    5. Anxiety and depression  - stable  - defer to psychiatry    6. Acquired hypothyroidism  - TSH 3RD GENERATION; Future    7. Senile osteoporosis  - restart alendronate (FOSAMAX) 70 mg tablet; Take 1 Tab by mouth every seven (7) days. Dispense: 12 Tab; Refill: 3    8. Need for hepatitis C screening test  - HCV AB W/RFLX TO JOHN; Future    9. Chest pain, unspecified type  - REFERRAL TO CARDIOLOGY    10. Abdominal bloating  - REFERRAL TO GASTROENTEROLOGY    11. Gastroesophageal reflux disease, unspecified whether esophagitis present  - REFERRAL TO GASTROENTEROLOGY  - continue PPI    Follow-up and Dispositions    · Return in about 3 months (around 8/3/2021) for ROV, in-person. I have discussed the diagnosis with the patient and the intended plan as seen in the above orders.   The patient has received an after-visit summary and questions were answered concerning future plans. I have discussed medication side effects and warnings with the patient as well. I have reviewed the plan of care with the patient, accepted their input and they are in agreement with the treatment goals.        Samuel Encarnacion MD  May 3, 2021

## 2021-05-03 NOTE — PROGRESS NOTES
Elizabeth Hernandez presents today for htn, depression, dm, thyroid   - Is someone accompanying this pt? no  - Is the patient using any durable medical equipment during office visit? no    Coordination of Care:  1. Have you been to the ER, urgent care clinic since your last visit? Hospitalized since your last visit? no    2. Have you seen or consulted any other health care providers outside of the [address] [place] [address] since your last visit? Include any pap smears or colon screening.  Gynecology

## 2021-05-04 LAB
HCV AB S/CO SERPL IA: 0.1 S/CO RATIO (ref 0–0.9)
HCV AB SERPL QL IA: NORMAL
OSMOLALITY UR: 206 MOSMOL/KG

## 2021-05-07 DIAGNOSIS — K21.9 GASTROESOPHAGEAL REFLUX DISEASE, UNSPECIFIED WHETHER ESOPHAGITIS PRESENT: Primary | ICD-10-CM

## 2021-05-10 RX ORDER — PANTOPRAZOLE SODIUM 40 MG/1
40 TABLET, DELAYED RELEASE ORAL DAILY
Qty: 90 TAB | Refills: 1 | Status: SHIPPED | OUTPATIENT
Start: 2021-05-10 | End: 2021-09-24 | Stop reason: SDUPTHER

## 2021-05-17 DIAGNOSIS — E11.42 TYPE 2 DIABETES MELLITUS WITH DIABETIC POLYNEUROPATHY, WITHOUT LONG-TERM CURRENT USE OF INSULIN (HCC): Primary | ICD-10-CM

## 2021-05-18 ENCOUNTER — OFFICE VISIT (OUTPATIENT)
Dept: CARDIOLOGY CLINIC | Age: 81
End: 2021-05-18
Payer: MEDICARE

## 2021-05-18 VITALS
BODY MASS INDEX: 31.6 KG/M2 | WEIGHT: 167.4 LBS | DIASTOLIC BLOOD PRESSURE: 76 MMHG | TEMPERATURE: 97.4 F | RESPIRATION RATE: 18 BRPM | HEIGHT: 61 IN | HEART RATE: 76 BPM | OXYGEN SATURATION: 98 % | SYSTOLIC BLOOD PRESSURE: 132 MMHG

## 2021-05-18 DIAGNOSIS — I10 ESSENTIAL HYPERTENSION: ICD-10-CM

## 2021-05-18 DIAGNOSIS — R07.9 CHEST PAIN, UNSPECIFIED TYPE: Primary | ICD-10-CM

## 2021-05-18 PROCEDURE — G8752 SYS BP LESS 140: HCPCS | Performed by: INTERNAL MEDICINE

## 2021-05-18 PROCEDURE — G8417 CALC BMI ABV UP PARAM F/U: HCPCS | Performed by: INTERNAL MEDICINE

## 2021-05-18 PROCEDURE — G8754 DIAS BP LESS 90: HCPCS | Performed by: INTERNAL MEDICINE

## 2021-05-18 PROCEDURE — 1090F PRES/ABSN URINE INCON ASSESS: CPT | Performed by: INTERNAL MEDICINE

## 2021-05-18 PROCEDURE — G9717 DOC PT DX DEP/BP F/U NT REQ: HCPCS | Performed by: INTERNAL MEDICINE

## 2021-05-18 PROCEDURE — G8536 NO DOC ELDER MAL SCRN: HCPCS | Performed by: INTERNAL MEDICINE

## 2021-05-18 PROCEDURE — 99204 OFFICE O/P NEW MOD 45 MIN: CPT | Performed by: INTERNAL MEDICINE

## 2021-05-18 PROCEDURE — G8428 CUR MEDS NOT DOCUMENT: HCPCS | Performed by: INTERNAL MEDICINE

## 2021-05-18 PROCEDURE — 1101F PT FALLS ASSESS-DOCD LE1/YR: CPT | Performed by: INTERNAL MEDICINE

## 2021-05-18 PROCEDURE — 93000 ELECTROCARDIOGRAM COMPLETE: CPT | Performed by: INTERNAL MEDICINE

## 2021-05-18 RX ORDER — SITAGLIPTIN AND METFORMIN HYDROCHLORIDE 500; 50 MG/1; MG/1
1 TABLET, FILM COATED ORAL DAILY
Qty: 90 TAB | Refills: 3 | Status: SHIPPED | OUTPATIENT
Start: 2021-05-18 | End: 2022-05-17 | Stop reason: ALTCHOICE

## 2021-05-18 NOTE — PROGRESS NOTES
Rach Long presents today for   Chief Complaint   Patient presents with    New Patient     cp        Rach Long preferred language for health care discussion is english/other. Personal Protective Equipment:   Personal Protective Equipment was used including: mask-surgical and hands-gloves. Patient was placed on no precaution(s). Patient was masked. Precautions:   Patient currently on None  Patient currently roomed with door closed    Is someone accompanying this pt?no    Is the patient using any DME equipment during [address]? no    Depression Screening:  3 most recent PHQ Screens 5/3/2021   Little interest or pleasure in doing things Nearly every day   Feeling down, depressed, irritable, or hopeless Nearly every day   Total Score PHQ 2 6       Learning Assessment:  No flowsheet data found. Abuse Screening:  Abuse Screening Questionnaire 5/3/2021   Do you ever feel afraid of your partner? N   Are you in a relationship with someone who physically or mentally threatens you? N   Is it safe for you to go home? Y       Fall Risk  Fall Risk Assessment, last 12 mths 5/18/2021   Able to walk? Yes   Fall in past 12 months? 0   Do you feel unsteady? 0   Are you worried about falling 0   Number of falls in past 12 months -   Fall with injury? -       Pt currently taking Anticoagulant therapy? no    Coordination of Care:  1. Have you been to the ER, urgent care clinic since your last visit? Hospitalized since your last visit? no    2. Have you seen or consulted any other health care providers outside of the [address] [place] [address] since your last visit? Include any pap smears or colon screening.  no

## 2021-05-18 NOTE — PROGRESS NOTES
[facility]    Ms. Susie Christie is 72-year-old female with history of diabetes, hypertension, hyperlipidemia and hypothyroidism    Patient is here today for initial cardiac evaluation. She denies any prior history of MI or CHF. Patient is here today as patient had an episode of chest discomfort approximately 3 weeks ago. She said that she was sitting and she had some discomfort in her chest which was sharp in nature which lasted no more than 5 seconds. There was no radiation. There was no exertional component. She never had this kind of pain before. She got worried so she wanted to make sure her heart is okay  She is functionally active. She goes to [employer] and grocery shopping and she can push cart without any problem. She has occasional dyspnea but no significant limitation. She has on and off ankle swelling. She uses 12 pillows at night. She denies any palpitation, presyncope or syncope  Denies any nausea, vomiting, abdominal pain, fever, chills, sputum production. No hematuria or other bleeding complaints    Past Medical History:   Diagnosis Date    Breast cancer ([facility] [address] 2002    Stage 1, right    Depression     Diabetes ([facility] [address]     non insulin dependent    Hypercholesterolemia     Hypertension     Hypothyroidism     Lyme disease        Review of Systems:  Cardiac symptoms as noted above in HPI. All others negative. Current Outpatient Medications   Medication Sig    atenoloL (TENORMIN) 50 mg tablet Take 1 Tab by mouth daily.  amLODIPine (NORVASC) 2.5 mg tablet Take 1 Tab by mouth daily.  simvastatin (ZOCOR) 10 mg tablet Take 1 Tab by mouth nightly.  losartan (COZAAR) 100 mg tablet Take 1 Tab by mouth daily.  aspirin 81 mg chewable tablet Take 81 mg by mouth daily.  SITagliptin-metFORMIN (Janumet)  mg per tablet Take 1 Tab by mouth daily.     pantoprazole (PROTONIX) 40 mg tablet Take 1 Tab by mouth daily.    methylphenidate HCl (RITALIN) 20 mg tablet     alendronate (FOSAMAX) 70 mg tablet Take 1 Tab by mouth every seven (7) days.  pregabalin (Lyrica) 75 mg capsule Take 1 Cap by mouth daily. Max Daily Amount: 75 mg.  levothyroxine (SYNTHROID) 50 mcg tablet Take 1 Tab by mouth Daily (before breakfast).  Walker (Ultra-Light Rollator) misc Use as needed    ascorbic acid, vitamin C, (VITAMIN C) 500 mg tablet Take 500 mg by mouth daily.  oxybutynin chloride XL (DITROPAN XL) 10 mg CR tablet     True Metrix Glucose Test Strip strip     Osphena 60 mg tab tablet TAKE ONE TABLET BY MOUTH EVERY DAY WITH FOOD    metFORMIN (GLUCOPHAGE) 500 mg tablet Take 500 mg by mouth two (2) times daily (with meals).  calcium-vitamin D 600 mg(1,500mg) -200 unit tab Take 2 Tabs by mouth two (2) times a day.  TRUEplus Lancets 28 gauge misc     OLANZapine (ZyPREXA) 5 mg tablet     folic acid (FOLVITE) 181 mcg tablet Take 0.8 mg by mouth daily.  clonazePAM (KlonoPIN) 0.5 mg tablet     acetaminophen (TYLENOL EXTRA STRENGTH) 500 mg tablet Take 2 Tabs by mouth every six (6) hours as needed for Pain.  omega-3 fatty acids-vitamin e 1,000 mg cap Take 1 Cap by mouth.  multivitamin (ONE A DAY) tablet Take 1 Tab by mouth daily.  venlafaxine-SR (EFFEXOR-XR) 150 mg capsule Take  by mouth two (2) times a day.  cranberry extract 450 mg Tab Take  by mouth. No current facility-administered medications for this visit.         Past Surgical History:   Procedure Laterality Date    HX APPENDECTOMY      HX BREAST LUMPECTOMY  2002    RIGHT BREAST    HX HYSTERECTOMY  2008    PRECANCEROUS CELLS ON RIGHT OVARY    HX SHOULDER ARTHROSCOPY      HX TONSILLECTOMY  AGE 15       Allergies and Sensitivities:  Allergies   Allergen Reactions    Codeine Itching    Pcn [Penicillins] Hives and Swelling    Meperidine Itching       Family History:  Family History   Problem Relation Age of Onset    Heart Disease Mother    [facility] Heart Disease Father     Hypertension Sister     Heart Disease Brother        Social History:  Social History     Tobacco Use    Smoking status: Never Smoker    Smokeless tobacco: Never Used   Substance Use Topics    Alcohol use: No    Drug use: No     She  reports that she has never smoked. She has never used smokeless tobacco.  She  reports no history of alcohol use. Physical Exam:  BP Readings from Last 3 Encounters:   05/18/21 132/76   05/03/21 138/78   10/12/20 134/69         Pulse Readings from Last 3 Encounters:   05/18/21 76   05/03/21 68   10/12/20 72          Wt Readings from Last 3 Encounters:   05/18/21 75.9 kg (167 lb 6.4 oz)   05/03/21 72.6 kg (160 lb)   10/12/20 72.6 kg (160 lb)       Constitutional: Oriented to person, place, and time. HENT: Head: Normocephalic and atraumatic. Neck: No JVD present. Cardiovascular: Regular rhythm. No murmur, gallop or rubs appreciated  Lung: Breath sounds normal. No respiratory distress. No ronchi or rales appreciated  Abdominal: No tenderness. No rebound and no guarding. Musculoskeletal: There is no lower extremity edema. No cynosis  Lymphadenopathy:  No cervical or supraclavicular adenopathy appriciated. Neurological: No gross motor deficit noted. Skin: No visible skin rash noted. No Ear discharge noted  Psychiatric: Normal mood and affect.    Good distal pulse    LABS:   @  Lab Results   Component Value Date/Time    WBC 4.8 10/12/2020 02:56 PM    HGB 11.8 (L) 10/12/2020 02:56 PM    HCT 36.5 10/12/2020 02:56 PM    PLATELET 231 91/91/2775 02:56 PM    MCV 90.6 10/12/2020 02:56 PM     Lab Results   Component Value Date/Time    Sodium 132 (L) 05/03/2021 09:01 AM    Potassium 4.2 05/03/2021 09:01 AM    Chloride 96 (L) 05/03/2021 09:01 AM    CO2 28 05/03/2021 09:01 AM    Glucose 197 (H) 05/03/2021 09:01 AM    BUN 11 05/03/2021 09:01 AM    Creatinine 0.92 05/03/2021 09:01 AM     Lipids Latest Ref Rng & Units 10/12/2020   Chol, Total <200 MG/ HDL 40 - 60 MG/DL 63(H)   LDL 0 - 100 MG/DL 44.6   Trig <150 MG/(H)   Chol/HDL Ratio 0 - 5.0   2.2   Some recent data might be hidden     Lab Results   Component Value Date/Time    ALT (SGPT) 26 10/12/2020 02:56 PM     Lab Results   Component Value Date/Time    Hemoglobin A1c 6.3 (H) 04/28/2021 01:24 PM     Lab Results   Component Value Date/Time    TSH 2.22 05/03/2021 09:01 AM       EKG  05/18/2021: Sinus rhythm at 72 bpm.  No ST changes of ischemia. No Q waves    ECHO    STRESS TEST (EST, PHARM, NUC, ECHO etc)    CATHETERIZATION    IMPRESSION & PLAN:  Ms. Shilpi Cross is 70-year female with diabetes, hypertension hyperlipidemia    Chest pain:  Episode was highly atypical of angina as mentioned in HPI  We will check echo to rule out hypertensive cardiovascular heart disease rule out pericardial disease  Patient already on aspirin, amlodipine, beta-blocker and statin. Hypertension:  /76. Currently on atenolol, amlodipine, losartan. Echo to rule out hypertensive cardiovascular heart disease    Hyperlipidemia:  Currently on low-dose of simvastatin. Last LDL 44. Continue same    This plan was discussed with patient who is in agreement. Thank you for allowing me to participate in patient care. Please feel free to call me if you have any question or concern. Alma Rosa Hastings MD  Please note: This document has been produced using voice recognition software. Unrecognized errors in transcription may be present.

## 2021-05-18 NOTE — PROGRESS NOTES
Aleksandra Seth presents today for   Chief Complaint   Patient presents with    New Patient     cp        Aleksandra Seth preferred language for health care discussion is english/other. Personal Protective Equipment:   Personal Protective Equipment was used including: mask-surgical and hands-gloves. Patient was placed on no precaution(s). Patient was masked. Precautions:   Patient currently on None  Patient currently roomed with door closed    Is someone accompanying this pt? no    Is the patient using any DME equipment during [address]? no    Depression Screening:  3 most recent PHQ Screens 5/3/2021   Little interest or pleasure in doing things Nearly every day   Feeling down, depressed, irritable, or hopeless Nearly every day   Total Score PHQ 2 6       Learning Assessment:  No flowsheet data found. Abuse Screening:  Abuse Screening Questionnaire 5/3/2021   Do you ever feel afraid of your partner? N   Are you in a relationship with someone who physically or mentally threatens you? N   Is it safe for you to go home? Y       Fall Risk  Fall Risk Assessment, last 12 mths 5/18/2021   Able to walk? Yes   Fall in past 12 months? 0   Do you feel unsteady? 0   Are you worried about falling 0   Number of falls in past 12 months -   Fall with injury? -       Pt currently taking Anticoagulant therapy? no    Coordination of Care:  1. Have you been to the ER, urgent care clinic since your last visit? Hospitalized since your last visit? no    2. Have you seen or consulted any other health care providers outside of the [address] [place] [address] since your last visit? Include any pap smears or colon screening.  no

## 2021-05-18 NOTE — TELEPHONE ENCOUNTER
Requested Prescriptions     Pending Prescriptions Disp Refills    SITagliptin-metFORMIN (Janumet)  mg per tablet       Sig: daily.

## 2021-06-01 ENCOUNTER — TELEPHONE (OUTPATIENT)
Dept: FAMILY MEDICINE CLINIC | Age: 81
End: 2021-06-01

## 2021-06-01 DIAGNOSIS — E11.42 TYPE 2 DIABETES MELLITUS WITH DIABETIC POLYNEUROPATHY, WITHOUT LONG-TERM CURRENT USE OF INSULIN (HCC): Primary | ICD-10-CM

## 2021-06-01 DIAGNOSIS — B00.9 HERPES SIMPLEX: Primary | ICD-10-CM

## 2021-06-01 RX ORDER — INSULIN PUMP SYRINGE, 3 ML
EACH MISCELLANEOUS
Qty: 1 KIT | Refills: 0 | Status: SHIPPED | OUTPATIENT
Start: 2021-06-01 | End: 2022-07-27 | Stop reason: SDUPTHER

## 2021-06-01 NOTE — TELEPHONE ENCOUNTER
Pt. Would like to speak with either Dr. Julio Cesar Rose or nurse regarding valACYclovir (VALTREX) 500 mg tablet. She is requesting refill but would like to speak to someone about it.  Please assist.

## 2021-06-02 RX ORDER — VALACYCLOVIR HYDROCHLORIDE 1 G/1
1000 TABLET, FILM COATED ORAL DAILY
Qty: 30 TABLET | Refills: 1 | Status: SHIPPED | OUTPATIENT
Start: 2021-06-02 | End: 2021-06-07

## 2021-06-02 NOTE — TELEPHONE ENCOUNTER
Nurse spoke with patient, patient is requesting a 90 day supply for Valtrex. She states the outbreak is lasting longer than the supplies given. Pt is requesting a 90 day supply sent to [facility]. Please advise.

## 2021-06-02 NOTE — TELEPHONE ENCOUNTER
Nurse spoke to patient, patient advised that medication sent to pharmacy. Monitor for dm sent as well. Pt verbalized understanding.

## 2021-06-16 DIAGNOSIS — I10 ESSENTIAL HYPERTENSION: ICD-10-CM

## 2021-06-16 NOTE — TELEPHONE ENCOUNTER
Pt is requesting for these medications to be sent to [facility].  Please assist.

## 2021-06-21 RX ORDER — METFORMIN HYDROCHLORIDE 500 MG/1
500 TABLET ORAL 2 TIMES DAILY WITH MEALS
Qty: 180 TABLET | Refills: 2 | Status: SHIPPED | OUTPATIENT
Start: 2021-06-21 | End: 2022-03-01 | Stop reason: SDUPTHER

## 2021-06-21 RX ORDER — ATENOLOL 50 MG/1
50 TABLET ORAL DAILY
Qty: 90 TABLET | Refills: 2 | Status: SHIPPED | OUTPATIENT
Start: 2021-06-21 | End: 2022-04-18 | Stop reason: SDUPTHER

## 2021-07-26 ENCOUNTER — IMPORTED ENCOUNTER (OUTPATIENT)
Dept: URBAN - METROPOLITAN AREA CLINIC 1 | Facility: CLINIC | Age: 81
End: 2021-07-26

## 2021-07-26 PROBLEM — H04.123: Noted: 2021-07-26

## 2021-07-26 PROBLEM — H40.023: Noted: 2021-07-26

## 2021-07-26 PROBLEM — H16.143: Noted: 2021-07-26

## 2021-07-26 PROCEDURE — 92012 INTRM OPH EXAM EST PATIENT: CPT

## 2021-07-26 PROCEDURE — 92083 EXTENDED VISUAL FIELD XM: CPT

## 2021-07-26 PROCEDURE — 92133 CPTRZD OPH DX IMG PST SGM ON: CPT

## 2021-07-26 NOTE — PATIENT DISCUSSION
1.  Glaucoma Suspect OU -- (CD  (CD 0.8 OU) IOP 12 OU no gtts. Negative family Hx. Baseline OCT today showing minimal thinning OD WNL OS. HVF today showing superior and inferior defects however quality is poor. Patient is considered high risk. Condition was discussed with patient and patient understands. Will continue to monitor patient for any progression in condition. 2.  JASMINA w/ PEK OU -- PEK persist despite routine use of ATs. Begin Restasis BID OU. In addition continue ATs TID-QID. Recommend instilling ATs prior to Restasis. 3. Pseudophakia OU -- Toric OU ([facility]). 4.  H/o DM Type II ([facility]ral [facility]) w/o DR No DME. Return for an appointment in 3 months for a 10/k check with Dr. Valencia Perez.

## 2021-08-24 ENCOUNTER — OFFICE VISIT (OUTPATIENT)
Dept: CARDIOLOGY CLINIC | Age: 81
End: 2021-08-24
Payer: MEDICARE

## 2021-08-24 VITALS
OXYGEN SATURATION: 98 % | DIASTOLIC BLOOD PRESSURE: 69 MMHG | HEIGHT: 61 IN | BODY MASS INDEX: 29.79 KG/M2 | TEMPERATURE: 97.7 F | SYSTOLIC BLOOD PRESSURE: 144 MMHG | HEART RATE: 74 BPM | WEIGHT: 157.8 LBS

## 2021-08-24 DIAGNOSIS — I10 ESSENTIAL HYPERTENSION: ICD-10-CM

## 2021-08-24 DIAGNOSIS — E78.00 PURE HYPERCHOLESTEROLEMIA: ICD-10-CM

## 2021-08-24 DIAGNOSIS — R07.9 CHEST PAIN, UNSPECIFIED TYPE: Primary | ICD-10-CM

## 2021-08-24 PROCEDURE — G8753 SYS BP > OR = 140: HCPCS | Performed by: INTERNAL MEDICINE

## 2021-08-24 PROCEDURE — G9717 DOC PT DX DEP/BP F/U NT REQ: HCPCS | Performed by: INTERNAL MEDICINE

## 2021-08-24 PROCEDURE — G8536 NO DOC ELDER MAL SCRN: HCPCS | Performed by: INTERNAL MEDICINE

## 2021-08-24 PROCEDURE — 99213 OFFICE O/P EST LOW 20 MIN: CPT | Performed by: INTERNAL MEDICINE

## 2021-08-24 PROCEDURE — 1090F PRES/ABSN URINE INCON ASSESS: CPT | Performed by: INTERNAL MEDICINE

## 2021-08-24 PROCEDURE — G8754 DIAS BP LESS 90: HCPCS | Performed by: INTERNAL MEDICINE

## 2021-08-24 PROCEDURE — G8417 CALC BMI ABV UP PARAM F/U: HCPCS | Performed by: INTERNAL MEDICINE

## 2021-08-24 PROCEDURE — 1101F PT FALLS ASSESS-DOCD LE1/YR: CPT | Performed by: INTERNAL MEDICINE

## 2021-08-24 PROCEDURE — G8428 CUR MEDS NOT DOCUMENT: HCPCS | Performed by: INTERNAL MEDICINE

## 2021-08-24 NOTE — PROGRESS NOTES
Edelmira Zabala presents today for   Chief Complaint   Patient presents with   [place] preferred language for health care discussion is english. Personal Protective Equipment:   Personal Protective Equipment was used including: mask-surgical and hands-gloves. Patient was placed on no precaution(s). Patient was masked. Precautions:   Patient currently on None  Patient currently roomed with door closed    Is someone accompanying this pt? no    Is the patient using any DME equipment during [address]? no    Depression Screening:  3 most recent PHQ Screens 5/3/2021   Little interest or pleasure in doing things Nearly every day   Feeling down, depressed, irritable, or hopeless Nearly every day   Total Score PHQ 2 6       Learning Assessment:  No flowsheet data found. Abuse Screening:  Abuse Screening Questionnaire 5/3/2021   Do you ever feel afraid of your partner? N   Are you in a relationship with someone who physically or mentally threatens you? N   Is it safe for you to go home? Y       Fall Risk  Fall Risk Assessment, last 12 mths 5/18/2021   Able to walk? Yes   Fall in past 12 months? 0   Do you feel unsteady? 0   Are you worried about falling 0   Number of falls in past 12 months -   Fall with injury? -       Pt currently taking Anticoagulant therapy? no    Coordination of Care:  1. Have you been to the ER, urgent care clinic since your last visit? Hospitalized since your last visit? no    2. Have you seen or consulted any other health care providers outside of the [address] [place] [address] since your last visit? Include any pap smears or colon screening.  no

## 2021-08-24 NOTE — LETTER
8/24/2021    Patient: Santa Presley   YOB: 1940   Date of Visit: 8/24/2021     Bobbi Calix MD  [address] [place] [address]  [facility] [place]    Dear Bobbi Calix MD,      Thank you for referring Ms. Bridget Vallejo to [address] [place] [address] for evaluation. My notes for this consultation are attached. If you have questions, please do not hesitate to call me. I look forward to following your patient along with you.       Sincerely,    Demetria Reyes MD

## 2021-08-24 NOTE — PROGRESS NOTES
[facility]    Ms. Hugh Ferrara is 68 y.o. female with history of diabetes, hypertension, hyperlipidemia and hypothyroidism    Patient is here today for cardiac evaluation. She denies any prior history of MI or CHF. Patient denies any chest pain or chest tightness since last visit. She denies any symptoms that is concerning for unstable angina or decompensated heart failure. She denies presyncope or syncope. She has occasional mild lower extremity swelling. Denies any nausea, vomiting, abdominal pain, fever, chills, sputum production. No hematuria or other bleeding complaints    Past Medical History:   Diagnosis Date    Breast cancer ([facility] [address] 2002    Stage 1, right    Depression     Diabetes ([facility] [address]     Hypercholesterolemia     Hypertension     Hypothyroidism     Lyme disease        Review of Systems:  Cardiac symptoms as noted above in HPI. All others negative. Current Outpatient Medications   Medication Sig    atenoloL (TENORMIN) 50 mg tablet Take 1 Tablet by mouth daily.  metFORMIN (GLUCOPHAGE) 500 mg tablet Take 1 Tablet by mouth two (2) times daily (with meals).  amLODIPine (NORVASC) 2.5 mg tablet Take 1 Tab by mouth daily.  simvastatin (ZOCOR) 10 mg tablet Take 1 Tab by mouth nightly.  losartan (COZAAR) 100 mg tablet Take 1 Tab by mouth daily.  aspirin 81 mg chewable tablet Take 81 mg by mouth daily.  Blood-Glucose Meter (True Metrix Air Glucose Meter) monitoring kit Use once daily    SITagliptin-metFORMIN (Janumet)  mg per tablet Take 1 Tab by mouth daily.  pantoprazole (PROTONIX) 40 mg tablet Take 1 Tab by mouth daily.  methylphenidate HCl (RITALIN) 20 mg tablet     alendronate (FOSAMAX) 70 mg tablet Take 1 Tab by mouth every seven (7) days.  pregabalin (Lyrica) 75 mg capsule Take 1 Cap by mouth daily. Max Daily Amount: 75 mg.     levothyroxine (SYNTHROID) 50 mcg tablet Take 1 Tab by mouth Daily (before breakfast).  Walker (Ultra-Light Rollator) misc Use as needed    ascorbic acid, vitamin C, (VITAMIN C) 500 mg tablet Take 500 mg by mouth daily.  oxybutynin chloride XL (DITROPAN XL) 10 mg CR tablet     True Metrix Glucose Test Strip strip     Osphena 60 mg tab tablet TAKE ONE TABLET BY MOUTH EVERY DAY WITH FOOD    calcium-vitamin D 600 mg(1,500mg) -200 unit tab Take 2 Tabs by mouth two (2) times a day.  TRUEplus Lancets 28 gauge misc     OLANZapine (ZyPREXA) 5 mg tablet     folic acid (FOLVITE) 718 mcg tablet Take 0.8 mg by mouth daily.  clonazePAM (KlonoPIN) 0.5 mg tablet     acetaminophen (TYLENOL EXTRA STRENGTH) 500 mg tablet Take 2 Tabs by mouth every six (6) hours as needed for Pain.  omega-3 fatty acids-vitamin e 1,000 mg cap Take 1 Cap by mouth.  multivitamin (ONE A DAY) tablet Take 1 Tab by mouth daily.  venlafaxine-SR (EFFEXOR-XR) 150 mg capsule Take  by mouth two (2) times a day.  cranberry extract 450 mg Tab Take  by mouth. No current facility-administered medications for this visit. Past Surgical History:   Procedure Laterality Date    HX APPENDECTOMY      HX BREAST LUMPECTOMY  2002    RIGHT BREAST    HX HYSTERECTOMY  2008    PRECANCEROUS CELLS ON RIGHT OVARY    HX SHOULDER ARTHROSCOPY      HX TONSILLECTOMY  AGE 15       Allergies and Sensitivities:  Allergies   Allergen Reactions    Codeine Itching    Pcn [Penicillins] Hives and Swelling    Meperidine Itching       Family History:  Family History   Problem Relation Age of Onset    Heart Disease Mother     Heart Disease Father     Hypertension Sister     Heart Disease Brother        Social History:  Social History     Tobacco Use    Smoking status: Never Smoker    Smokeless tobacco: Never Used   Substance Use Topics    Alcohol use: No    Drug use: No     She  reports that she has never smoked.  She has never used smokeless tobacco.  She  reports no history of alcohol use.    Physical Exam:  BP Readings from Last 3 Encounters:   08/24/21 (!) 175/75   06/22/21 (!) 160/84   05/18/21 132/76         Pulse Readings from Last 3 Encounters:   08/24/21 78   05/18/21 76   05/03/21 68          Wt Readings from Last 3 Encounters:   08/24/21 71.6 kg (157 lb 12.8 oz)   06/22/21 75.8 kg (167 lb)   05/18/21 75.9 kg (167 lb 6.4 oz)       Constitutional: Oriented to person, place, and time. HENT: Head: Normocephalic and atraumatic. Neck: No JVD present. Cardiovascular: Regular rhythm. No murmur, gallop or rubs appreciated  Lung: Breath sounds normal. No respiratory distress. No ronchi or rales appreciated  Abdominal: No tenderness. No rebound and no guarding. Musculoskeletal: There is trace lower extremity edema. No cynosis    LABS:   @  Lab Results   Component Value Date/Time    WBC 4.8 10/12/2020 02:56 PM    HGB 11.8 (L) 10/12/2020 02:56 PM    HCT 36.5 10/12/2020 02:56 PM    PLATELET 111 15/84/3538 02:56 PM    MCV 90.6 10/12/2020 02:56 PM     Lab Results   Component Value Date/Time    Sodium 132 (L) 05/03/2021 09:01 AM    Potassium 4.2 05/03/2021 09:01 AM    Chloride 96 (L) 05/03/2021 09:01 AM    CO2 28 05/03/2021 09:01 AM    Glucose 197 (H) 05/03/2021 09:01 AM    BUN 11 05/03/2021 09:01 AM    Creatinine 0.92 05/03/2021 09:01 AM     Lipids Latest Ref Rng & Units 10/12/2020   Chol, Total <200 MG/   HDL 40 - 60 MG/DL 63(H)   LDL 0 - 100 MG/DL 44.6   Trig <150 MG/(H)   Chol/HDL Ratio 0 - 5.0   2.2   Some recent data might be hidden     Lab Results   Component Value Date/Time    ALT (SGPT) 26 10/12/2020 02:56 PM     Lab Results   Component Value Date/Time    Hemoglobin A1c 6.3 (H) 04/28/2021 01:24 PM     Lab Results   Component Value Date/Time    TSH 2.22 05/03/2021 09:01 AM       EKG  05/18/2021: Sinus rhythm at 72 bpm.  No ST changes of ischemia. No Q waves    ECHO (06/2021)  Left Ventricle Normal cavity size, wall thickness and systolic function (ejection fraction normal). The estimated EF is 55 - 60%. Visually measured ejection fraction. There is mild (grade 1) left ventricular diastolic dysfunction. Wall Scoring The left ventricular wall motion is normal.            Left Atrium Mildly dilated left atrium. Left Atrium volume index is 36.01 mL/m2. Right Ventricle Normal cavity size and global systolic function. Right Atrium Normal cavity size. Aortic Valve Trileaflet valve structure and no stenosis. There is noncoronary leaflet calcification. Trace aortic valve regurgitation. Mitral Valve No stenosis. Mitral valve non-specific thickening. Mild regurgitation. Tricuspid Valve Normal valve structure and no stenosis. Trace regurgitation. Pulmonic Valve Pulmonic valve not well visualized. No stenosis. Trace regurgitation. Aorta Normal aortic root. Mildly dilated ascending aorta; diameter is 3.6 cm. Pulmonary Artery Pulmonary arterial systolic pressure (PASP) is 15 mmHg. Pulmonary hypertension not suggested by Doppler findings. IVC/Hepatic Veins Normal central venous pressure (3 mmHg); IVC diameter is less than 21 mm and collapses more than 50% with respiration. STRESS TEST (EST, PHARM, NUC, ECHO etc)    CATHETERIZATION    IMPRESSION & PLAN:  Ms. Kilgore Daily is 68 y.o. female with diabetes, hypertension hyperlipidemia    Chest pain:  Episode was highly atypical of angina as mentioned in HPI  Echocardiogram with normal ejection fraction, no major valvular heart disease. Patient already taking aspirin, amlodipine, beta-blocker and statin  No episode since last visit. Continue with risk factor modification. Hypertension:  /75. Currently on atenolol, amlodipine, losartan. At home her blood pressure usually is 218I systolic. He is somewhat anxious today to find out her echocardiogram report    Hyperlipidemia:  Currently on low-dose of simvastatin. Last LDL 44. Continue same    This plan was discussed with patient who is in agreement.     Thank you for allowing me to participate in patient care. Please feel free to call me if you have any question or concern. Jayne Bazzi MD  Please note: This document has been produced using voice recognition software. Unrecognized errors in transcription may be present.

## 2021-08-26 DIAGNOSIS — E03.9 ACQUIRED HYPOTHYROIDISM: ICD-10-CM

## 2021-08-27 RX ORDER — LEVOTHYROXINE SODIUM 50 UG/1
TABLET ORAL
Qty: 90 TABLET | Refills: 1 | Status: SHIPPED | OUTPATIENT
Start: 2021-08-27 | End: 2022-04-18 | Stop reason: SDUPTHER

## 2021-08-29 DIAGNOSIS — I10 ESSENTIAL HYPERTENSION: ICD-10-CM

## 2021-08-29 RX ORDER — AMLODIPINE BESYLATE 2.5 MG/1
TABLET ORAL
Qty: 90 TABLET | Refills: 1 | Status: SHIPPED | OUTPATIENT
Start: 2021-08-29 | End: 2022-04-18 | Stop reason: SDUPTHER

## 2021-09-15 LAB — CREATININE, EXTERNAL: 0.8

## 2021-09-24 DIAGNOSIS — K21.9 GASTROESOPHAGEAL REFLUX DISEASE, UNSPECIFIED WHETHER ESOPHAGITIS PRESENT: ICD-10-CM

## 2021-09-24 DIAGNOSIS — E11.42 TYPE 2 DIABETES MELLITUS WITH DIABETIC POLYNEUROPATHY, WITHOUT LONG-TERM CURRENT USE OF INSULIN (HCC): ICD-10-CM

## 2021-09-24 DIAGNOSIS — I10 ESSENTIAL HYPERTENSION: ICD-10-CM

## 2021-09-24 RX ORDER — SIMVASTATIN 10 MG/1
10 TABLET, FILM COATED ORAL
Qty: 90 TABLET | Refills: 3 | Status: SHIPPED | OUTPATIENT
Start: 2021-09-24 | End: 2022-04-18 | Stop reason: SDUPTHER

## 2021-09-24 RX ORDER — PANTOPRAZOLE SODIUM 40 MG/1
40 TABLET, DELAYED RELEASE ORAL DAILY
Qty: 90 TABLET | Refills: 1 | Status: SHIPPED | OUTPATIENT
Start: 2021-09-24

## 2021-09-24 RX ORDER — LOSARTAN POTASSIUM 100 MG/1
100 TABLET ORAL DAILY
Qty: 90 TABLET | Refills: 3 | Status: SHIPPED | OUTPATIENT
Start: 2021-09-24 | End: 2022-04-18 | Stop reason: SDUPTHER

## 2021-09-24 RX ORDER — PREGABALIN 75 MG/1
75 CAPSULE ORAL DAILY
Qty: 90 CAPSULE | Refills: 0 | Status: SHIPPED | OUTPATIENT
Start: 2021-09-24 | End: 2022-03-07 | Stop reason: SDUPTHER

## 2021-10-28 ENCOUNTER — IMPORTED ENCOUNTER (OUTPATIENT)
Dept: URBAN - METROPOLITAN AREA CLINIC 1 | Facility: CLINIC | Age: 81
End: 2021-10-28

## 2021-10-28 PROBLEM — H16.143: Noted: 2021-10-28

## 2021-10-28 PROBLEM — H04.123: Noted: 2021-10-28

## 2021-10-28 PROCEDURE — 92012 INTRM OPH EXAM EST PATIENT: CPT

## 2021-10-28 NOTE — PATIENT DISCUSSION
1.  JASMINA w/ PEK OU -- PEK improved OU today. Continue Restasis BID OU. Recommend increase ATs QID OU. Recommend instilling ATs prior to Restasis. 2. Glaucoma Suspect OU -- (CD 0.8 OU) IOP 12 OU no gtts. Negative family Hx. Patient is considered high risk. Condition was discussed with patient and patient understands. Will continue to monitor patient for any progression in condition. 3.  Pseudophakia OU -- Toric OU ([facility]). 4.  H/o DM Type II (Oral Med) w/o DR Manuel DMEReturn for an appointment in 5 months for a 30/OCT with Dr. Oscar Sands.

## 2022-03-02 RX ORDER — METFORMIN HYDROCHLORIDE 500 MG/1
500 TABLET ORAL 2 TIMES DAILY WITH MEALS
Qty: 180 TABLET | Refills: 0 | Status: SHIPPED | OUTPATIENT
Start: 2022-03-02 | End: 2022-05-17

## 2022-03-03 ENCOUNTER — EMERGENCY VISIT (OUTPATIENT)
Dept: URBAN - METROPOLITAN AREA CLINIC 1 | Facility: CLINIC | Age: 82
End: 2022-03-03

## 2022-03-03 DIAGNOSIS — H04.123: ICD-10-CM

## 2022-03-03 PROCEDURE — 99213 OFFICE O/P EST LOW 20 MIN: CPT

## 2022-03-03 ASSESSMENT — TONOMETRY
OD_IOP_MMHG: 12
OS_IOP_MMHG: 12

## 2022-03-03 ASSESSMENT — VISUAL ACUITY
OD_SC: 20/25
OS_SC: 20/25

## 2022-03-03 NOTE — PATIENT DISCUSSION
Continue Restasis BID OU. Switch to PF ATs and increase to QID OU, routinely. *Pt c/o Restasis burning after using. Pt to use AT first and directly after, instill Restasis gtt.

## 2022-03-07 ENCOUNTER — OFFICE VISIT (OUTPATIENT)
Dept: INTERNAL MEDICINE CLINIC | Age: 82
End: 2022-03-07
Payer: MEDICARE

## 2022-03-07 VITALS
HEART RATE: 73 BPM | HEIGHT: 61 IN | RESPIRATION RATE: 18 BRPM | TEMPERATURE: 97 F | SYSTOLIC BLOOD PRESSURE: 143 MMHG | OXYGEN SATURATION: 98 % | BODY MASS INDEX: 28.7 KG/M2 | DIASTOLIC BLOOD PRESSURE: 77 MMHG | WEIGHT: 152 LBS

## 2022-03-07 DIAGNOSIS — K21.9 GASTROESOPHAGEAL REFLUX DISEASE WITHOUT ESOPHAGITIS: ICD-10-CM

## 2022-03-07 DIAGNOSIS — M81.0 SENILE OSTEOPOROSIS: ICD-10-CM

## 2022-03-07 DIAGNOSIS — Z00.00 MEDICARE ANNUAL WELLNESS VISIT, SUBSEQUENT: ICD-10-CM

## 2022-03-07 DIAGNOSIS — F41.9 ANXIETY AND DEPRESSION: ICD-10-CM

## 2022-03-07 DIAGNOSIS — E03.9 ACQUIRED HYPOTHYROIDISM: ICD-10-CM

## 2022-03-07 DIAGNOSIS — I10 PRIMARY HYPERTENSION: ICD-10-CM

## 2022-03-07 DIAGNOSIS — E11.42 TYPE 2 DIABETES MELLITUS WITH DIABETIC POLYNEUROPATHY, WITHOUT LONG-TERM CURRENT USE OF INSULIN (HCC): Primary | ICD-10-CM

## 2022-03-07 DIAGNOSIS — F32.A ANXIETY AND DEPRESSION: ICD-10-CM

## 2022-03-07 DIAGNOSIS — E78.00 HYPERCHOLESTEROLEMIA: ICD-10-CM

## 2022-03-07 PROCEDURE — 1090F PRES/ABSN URINE INCON ASSESS: CPT | Performed by: NURSE PRACTITIONER

## 2022-03-07 PROCEDURE — G9717 DOC PT DX DEP/BP F/U NT REQ: HCPCS | Performed by: NURSE PRACTITIONER

## 2022-03-07 PROCEDURE — G8753 SYS BP > OR = 140: HCPCS | Performed by: NURSE PRACTITIONER

## 2022-03-07 PROCEDURE — 1101F PT FALLS ASSESS-DOCD LE1/YR: CPT | Performed by: NURSE PRACTITIONER

## 2022-03-07 PROCEDURE — G0439 PPPS, SUBSEQ VISIT: HCPCS | Performed by: NURSE PRACTITIONER

## 2022-03-07 PROCEDURE — G8417 CALC BMI ABV UP PARAM F/U: HCPCS | Performed by: NURSE PRACTITIONER

## 2022-03-07 PROCEDURE — G8754 DIAS BP LESS 90: HCPCS | Performed by: NURSE PRACTITIONER

## 2022-03-07 PROCEDURE — G8536 NO DOC ELDER MAL SCRN: HCPCS | Performed by: NURSE PRACTITIONER

## 2022-03-07 PROCEDURE — 99214 OFFICE O/P EST MOD 30 MIN: CPT | Performed by: NURSE PRACTITIONER

## 2022-03-07 PROCEDURE — G8427 DOCREV CUR MEDS BY ELIG CLIN: HCPCS | Performed by: NURSE PRACTITIONER

## 2022-03-07 RX ORDER — PREGABALIN 75 MG/1
75 CAPSULE ORAL DAILY
Qty: 90 CAPSULE | Refills: 0 | Status: SHIPPED | OUTPATIENT
Start: 2022-03-07 | End: 2022-06-07 | Stop reason: ALTCHOICE

## 2022-03-07 RX ORDER — VALACYCLOVIR HYDROCHLORIDE 1 G/1
500 TABLET, FILM COATED ORAL
COMMUNITY
End: 2022-04-18 | Stop reason: SDUPTHER

## 2022-03-07 NOTE — PROGRESS NOTES
HISTORY OF PRESENT ILLNESS  Joelle Corbett is a 80 y.o. female. Here to establish care with PMHX diabetes, hypertension, hyperlipidemia and hypothyroidism  HPI  1) DM - controlled - Janumet 50 - 500 mg and metformin 500 mg BID Neuropathy Lyrica 75 mg nightly - reports compliance  Lab Results   Component Value Date/Time    Hemoglobin A1c 6.3 (H) 04/28/2021 01:24 PM    Hemoglobin A1c 6.3 (H) 10/12/2020 02:56 PM       2) HTN - amlodipine 2.5 mg, losartan 100 mg, atenolol 50 mg   BP Readings from Last 3 Encounters:   03/07/22 (!) 143/77   08/24/21 (!) 144/69   06/22/21 (!) 160/84       3) HLD - Zocor 10 mg   Lab Results   Component Value Date/Time    Cholesterol, total 139 10/12/2020 02:56 PM    HDL Cholesterol 63 (H) 10/12/2020 02:56 PM    LDL, calculated 44.6 10/12/2020 02:56 PM    VLDL, calculated 31.4 10/12/2020 02:56 PM    Triglyceride 157 (H) 10/12/2020 02:56 PM    CHOL/HDL Ratio 2.2 10/12/2020 02:56 PM       4) Hypothyroidism - Synthroid 50 mcg   Lab Results   Component Value Date/Time    TSH 2.22 05/03/2021 09:01 AM       5) Osteoporosis - Fosamax weekly        6) GERD - Protonix 40 mg daily       7) Recurrent HSV - Valtrex as needed       8) Depression - Effexor 150 mg, Zyprexa 5 mg and ritalin 20 mg - Follows Psychiatry Dr Blank Moody     BP (!) 143/77 (BP 1 Location: Left upper arm, BP Patient Position: Sitting)   Pulse 73   Temp 97 °F (36.1 °C) (Temporal)   Resp 18   Ht 5' 1\" (1.549 m)   Wt 152 lb (68.9 kg)   SpO2 98%   BMI 28.72 kg/m²   Current Outpatient Medications   Medication Sig Dispense Refill    ZINC OXIDE-COD LIVER OIL EX Apply  to affected area as needed for Skin Irritation.  valACYclovir (VALTREX) 1 gram tablet Take 500 mg by mouth.  pregabalin (Lyrica) 75 mg capsule Take 1 Capsule by mouth daily. Max Daily Amount: 75 mg. 90 Capsule 0    metFORMIN (GLUCOPHAGE) 500 mg tablet Take 1 Tablet by mouth two (2) times daily (with meals).  180 Tablet 0    simvastatin (ZOCOR) 10 mg tablet Take 1 Tablet by mouth nightly. 90 Tablet 3    pantoprazole (PROTONIX) 40 mg tablet Take 1 Tablet by mouth daily. 90 Tablet 1    losartan (COZAAR) 100 mg tablet Take 1 Tablet by mouth daily. 90 Tablet 3    amLODIPine (NORVASC) 2.5 mg tablet TAKE 1 TABLET EVERY DAY 90 Tablet 1    levothyroxine (SYNTHROID) 50 mcg tablet TAKE 1 TABLET EVERY DAY BEFORE BREAKFAST 90 Tablet 1    atenoloL (TENORMIN) 50 mg tablet Take 1 Tablet by mouth daily. 90 Tablet 2    Blood-Glucose Meter (True Metrix Air Glucose Meter) monitoring kit Use once daily 1 Kit 0    SITagliptin-metFORMIN (Janumet)  mg per tablet Take 1 Tab by mouth daily. 90 Tab 3    methylphenidate HCl (RITALIN) 20 mg tablet       alendronate (FOSAMAX) 70 mg tablet Take 1 Tab by mouth every seven (7) days. 12 Tab 3    ascorbic acid, vitamin C, (VITAMIN C) 500 mg tablet Take 500 mg by mouth daily.  oxybutynin chloride XL (DITROPAN XL) 10 mg CR tablet       True Metrix Glucose Test Strip strip       Osphena 60 mg tab tablet TAKE ONE TABLET BY MOUTH EVERY DAY WITH FOOD      calcium-vitamin D 600 mg(1,500mg) -200 unit tab Take 2 Tabs by mouth two (2) times a day.  TRUEplus Lancets 28 gauge misc       OLANZapine (ZyPREXA) 5 mg tablet       folic acid (FOLVITE) 707 mcg tablet Take 0.8 mg by mouth daily.  clonazePAM (KlonoPIN) 0.5 mg tablet       acetaminophen (TYLENOL EXTRA STRENGTH) 500 mg tablet Take 2 Tabs by mouth every six (6) hours as needed for Pain. 40 Tab 0    omega-3 fatty acids-vitamin e 1,000 mg cap Take 1 Cap by mouth.  multivitamin (ONE A DAY) tablet Take 1 Tab by mouth daily.  venlafaxine-SR (EFFEXOR-XR) 150 mg capsule Take  by mouth two (2) times a day.  aspirin 81 mg chewable tablet Take 81 mg by mouth daily.  magnesium sulfate, laxative, (EPSOM SALTS) 495 mg/5 gram gran Take  by mouth as needed.       Walker (Ultra-Light Rollator) misc Use as needed (Patient not taking: Reported on 3/7/2022) 1 Each 0       Review of Systems   Constitutional: Positive for malaise/fatigue. Negative for chills and fever. Eyes: Negative for blurred vision and double vision. Respiratory: Negative for cough, shortness of breath and wheezing. Cardiovascular: Positive for leg swelling (right ankle ). Negative for chest pain and palpitations. Neurological: Negative for dizziness and headaches. Physical Exam  Vitals and nursing note reviewed. Constitutional:       General: She is not in acute distress. Appearance: Normal appearance. She is not ill-appearing. HENT:      Head: Normocephalic. Right Ear: Tympanic membrane, ear canal and external ear normal.      Left Ear: Tympanic membrane, ear canal and external ear normal.      Mouth/Throat:      Comments: MASK  Eyes:      General: No scleral icterus. Extraocular Movements: Extraocular movements intact. Conjunctiva/sclera: Conjunctivae normal.      Pupils: Pupils are equal, round, and reactive to light. Cardiovascular:      Rate and Rhythm: Normal rate and regular rhythm. Pulses: Normal pulses. Heart sounds: Normal heart sounds. Pulmonary:      Effort: Pulmonary effort is normal. No respiratory distress. Breath sounds: Normal breath sounds. No wheezing. Abdominal:      Tenderness: There is no right CVA tenderness or left CVA tenderness. Musculoskeletal:         General: Normal range of motion. Cervical back: Normal range of motion. Right lower leg: No edema. Left lower leg: No edema. Lymphadenopathy:      Cervical: No cervical adenopathy. Skin:     General: Skin is warm and dry. Findings: No lesion or rash. Neurological:      General: No focal deficit present. Mental Status: She is alert and oriented to person, place, and time. Psychiatric:         Mood and Affect: Mood normal.         Behavior: Behavior normal.         Thought Content:  Thought content normal.         Judgment: Judgment normal.         ASSESSMENT and PLAN  Diagnoses and all orders for this visit:    1. Type 2 diabetes mellitus with diabetic polyneuropathy, without long-term current use of insulin (HCC)  -     HEMOGLOBIN A1C WITH EAG; Future  -     MICROALBUMIN, UR, RAND W/ MICROALB/CREAT RATIO; Future  -     pregabalin (Lyrica) 75 mg capsule; Take 1 Capsule by mouth daily. Max Daily Amount: 75 mg.  -     TOXASSURE SELECT 13 (MW); Future    2. Primary hypertension  -     METABOLIC PANEL, COMPREHENSIVE; Future  -     CBC WITH AUTOMATED DIFF; Future    3. Hypercholesterolemia  -     LIPID PANEL; Future    4. Acquired hypothyroidism  -     TSH AND FREE T4; Future    5. Senile osteoporosis    6. Gastroesophageal reflux disease without esophagitis    7. Anxiety and depression    8. Medicare annual wellness visit, subsequent    DM- previously controlled my need Change to Metformin only 500 mg BID and DC janumet awaiting lab work   HTN - controlled - continue amlodipine, atenolol and losartan   HLD - controlled - continue Zocor   hypothyroidism - controlled - continue synthroid   Anxiety and Depression - per Dr Suzette Mcdonough   GERD - continue protonix   Ostoeporosis - continue Fosomax weekly with vitamin D and calcium     Follow-up and Dispositions    · Return in about 3 months (around 6/7/2022) for HTN, CHOL, DM. This is the Subsequent Medicare Annual Wellness Exam, performed 12 months or more after the Initial AWV or the last Subsequent AWV    I have reviewed the patient's medical history in detail and updated the computerized patient record. Assessment/Plan   Education and counseling provided:  Are appropriate based on today's review and evaluation      8.  Medicare annual wellness visit, subsequent       Depression Risk Factor Screening     3 most recent PHQ Screens 5/3/2021   Little interest or pleasure in doing things Nearly every day   Feeling down, depressed, irritable, or hopeless Nearly every day   Total Score PHQ 2 6 Alcohol & Drug Abuse Risk Screen    Do you average more than 1 drink per night or more than 7 drinks a week:  No    On any one occasion in the past three months have you have had more than 3 drinks containing alcohol:  No          Functional Ability and Level of Safety    Hearing: has issues with low range sounds       Activities of Daily Living: The home contains: grab bars  Patient does total self care      Ambulation: with no difficulty     Fall Risk:  Fall Risk Assessment, last 12 mths 3/7/2022   Able to walk? Yes   Fall in past 12 months? 0   Do you feel unsteady? -   Are you worried about falling -   Number of falls in past 12 months -   Fall with injury? -      Abuse Screen:  Patient is not abused       Cognitive Screening    Has your family/caregiver stated any concerns about your memory: yes - short term memory per patient      Cognitive Screening: Normal - Mini Cog Test 3/3 distant recall     Health Maintenance Due     Health Maintenance Due   Topic Date Due    Foot Exam Q1  Never done    DTaP/Tdap/Td series (1 - Tdap) Never done    Shingrix Vaccine Age 50> (1 of 2) Never done    Pneumococcal 65+ years (1 of 1 - PPSV23) Never done    Flu Vaccine (1) 09/01/2021    MICROALBUMIN Q1  10/12/2021    Lipid Screen  10/12/2021    A1C test (Diabetic or Prediabetic)  10/28/2021       Patient Care Team   Patient Care Team:  Rosalinda Lutz MD as PCP - General (Internal Medicine)  Gopi Sandoval MD (Psychiatry)  Abdias Ha MD as Consulting Provider (Gastroenterology)  Madeleine Daigle MD as Consulting Provider (Cardiology)    History     Patient Active Problem List   Diagnosis Code    Hypertension I10    Hypercholesterolemia E78.00    Type 2 diabetes mellitus with diabetic polyneuropathy, without long-term current use of insulin ([facility] [address] E11.42    Acquired hypothyroidism E03.9    Anxiety and depression F41.9, F32. A    Senile osteoporosis M81.0    Grade I diastolic dysfunction A94.78 Past Medical History:   Diagnosis Date    Breast cancer ([facility] [address] 2002    Stage 1, right    Depression     Diabetes ([facility] [address]     Hypercholesterolemia     Hypertension     Hypothyroidism     Lyme disease     Neuropathy     OAB (overactive bladder)     Osteoporosis       Past Surgical History:   Procedure Laterality Date    HX APPENDECTOMY      HX BREAST LUMPECTOMY  2002    RIGHT BREAST    HX HYSTERECTOMY  2008    PRECANCEROUS CELLS ON RIGHT OVARY    HX SHOULDER ARTHROSCOPY      HX TONSILLECTOMY  AGE 15     Current Outpatient Medications   Medication Sig Dispense Refill    ZINC OXIDE-COD LIVER OIL EX Apply  to affected area as needed for Skin Irritation.  valACYclovir (VALTREX) 1 gram tablet Take 500 mg by mouth.  pregabalin (Lyrica) 75 mg capsule Take 1 Capsule by mouth daily. Max Daily Amount: 75 mg. 90 Capsule 0    metFORMIN (GLUCOPHAGE) 500 mg tablet Take 1 Tablet by mouth two (2) times daily (with meals). 180 Tablet 0    simvastatin (ZOCOR) 10 mg tablet Take 1 Tablet by mouth nightly. 90 Tablet 3    pantoprazole (PROTONIX) 40 mg tablet Take 1 Tablet by mouth daily. 90 Tablet 1    losartan (COZAAR) 100 mg tablet Take 1 Tablet by mouth daily. 90 Tablet 3    amLODIPine (NORVASC) 2.5 mg tablet TAKE 1 TABLET EVERY DAY 90 Tablet 1    levothyroxine (SYNTHROID) 50 mcg tablet TAKE 1 TABLET EVERY DAY BEFORE BREAKFAST 90 Tablet 1    atenoloL (TENORMIN) 50 mg tablet Take 1 Tablet by mouth daily. 90 Tablet 2    Blood-Glucose Meter (True Metrix Air Glucose Meter) monitoring kit Use once daily 1 Kit 0    SITagliptin-metFORMIN (Janumet)  mg per tablet Take 1 Tab by mouth daily. 90 Tab 3    methylphenidate HCl (RITALIN) 20 mg tablet       alendronate (FOSAMAX) 70 mg tablet Take 1 Tab by mouth every seven (7) days. 12 Tab 3    ascorbic acid, vitamin C, (VITAMIN C) 500 mg tablet Take 500 mg by mouth daily.       oxybutynin chloride XL (DITROPAN XL) 10 mg CR tablet       True Metrix Glucose Test Strip strip       Osphena 60 mg tab tablet TAKE ONE TABLET BY MOUTH EVERY DAY WITH FOOD      calcium-vitamin D 600 mg(1,500mg) -200 unit tab Take 2 Tabs by mouth two (2) times a day.  TRUEplus Lancets 28 gauge misc       OLANZapine (ZyPREXA) 5 mg tablet       folic acid (FOLVITE) 883 mcg tablet Take 0.8 mg by mouth daily.  clonazePAM (KlonoPIN) 0.5 mg tablet       acetaminophen (TYLENOL EXTRA STRENGTH) 500 mg tablet Take 2 Tabs by mouth every six (6) hours as needed for Pain. 40 Tab 0    omega-3 fatty acids-vitamin e 1,000 mg cap Take 1 Cap by mouth.  multivitamin (ONE A DAY) tablet Take 1 Tab by mouth daily.  venlafaxine-SR (EFFEXOR-XR) 150 mg capsule Take  by mouth two (2) times a day.  aspirin 81 mg chewable tablet Take 81 mg by mouth daily.  magnesium sulfate, laxative, (EPSOM SALTS) 495 mg/5 gram gran Take  by mouth as needed.       Walker (Ultra-Light Rollator) misc Use as needed (Patient not taking: Reported on 3/7/2022) 1 Each 0     Allergies   Allergen Reactions    Codeine Itching    Pcn [Penicillins] Hives and Swelling    Meperidine Itching       Family History   Problem Relation Age of Onset    Heart Disease Mother     Heart Disease Father     Hypertension Sister     Heart Disease Brother      Social History     Tobacco Use    Smoking status: Never Smoker    Smokeless tobacco: Never Used   Substance Use Topics    Alcohol use: No         Celina Lees, NP

## 2022-03-07 NOTE — PATIENT INSTRUCTIONS
Medicare Wellness Visit, Female     The best way to live healthy is to have a lifestyle where you eat a well-balanced diet, exercise regularly, limit alcohol use, and quit all forms of tobacco/nicotine, if applicable. Regular preventive services are another way to keep healthy. Preventive services (vaccines, screening tests, monitoring & exams) can help personalize your care plan, which helps you manage your own care. Screening tests can find health problems at the earliest stages, when they are easiest to treat. [facility] follows the current, evidence-based guidelines published by the [facility] Rakan Rousseau ([facility]) when recommending preventive services for our patients. Because we follow these guidelines, sometimes recommendations change over time as research supports it. (For example, mammograms used to be recommended annually. Even though Medicare will still pay for an annual mammogram, the newer guidelines recommend a mammogram every two years for women of average risk). Of course, you and your doctor may decide to screen more often for some diseases, based on your risk and your co-morbidities (chronic disease you are already diagnosed with). Preventive services for you include:  - Medicare offers their members a free annual wellness visit, which is time for you and your primary care provider to discuss and plan for your preventive service needs. Take advantage of this benefit every year!  -All adults over the age of 72 should receive the recommended pneumonia vaccines. Current USPSTF guidelines recommend a series of two vaccines for the best pneumonia protection.   -All adults should have a flu vaccine yearly and a tetanus vaccine every 10 years.   -All adults age 48 and older should receive the shingles vaccines (series of two vaccines).       -All adults age 38-68 who are overweight should have a diabetes screening test once every three years.   -All adults born between [birth year] and 1965 should be screened once for Hepatitis C.  -Other screening tests and preventive services for persons with diabetes include: an eye exam to screen for diabetic retinopathy, a kidney function test, a foot exam, and stricter control over your cholesterol.   -Cardiovascular screening for adults with routine risk involves an electrocardiogram (ECG) at intervals determined by your doctor.   -Colorectal cancer screenings should be done for adults age 54-65 with no increased risk factors for colorectal cancer. There are a number of acceptable methods of screening for this type of cancer. Each test has its own benefits and drawbacks. Discuss with your doctor what is most appropriate for you during your annual wellness visit. The different tests include: colonoscopy (considered the best screening method), a fecal occult blood test, a fecal DNA test, and sigmoidoscopy.    -A bone mass density test is recommended when a woman turns 65 to screen for osteoporosis. This test is only recommended one time, as a screening. Some providers will use this same test as a disease monitoring tool if you already have osteoporosis. -Breast cancer screenings are recommended every other year for women of normal risk, age 54-69.  -Cervical cancer screenings for women over age 72 are only recommended with certain risk factors.      Here is a list of your current Health Maintenance items (your personalized list of preventive services) with a due date:  Health Maintenance Due   Topic Date Due    Diabetic Foot Care  Never done    DTaP/Tdap/Td  (1 - Tdap) Never done    Shingles Vaccine (1 of 2) Never done    Pneumococcal Vaccine (1 of 1 - PPSV23) Never done    Yearly Flu Vaccine (1) 09/01/2021    Albumin Urine Test  10/12/2021    Cholesterol Test   10/12/2021    Hemoglobin A1C    10/28/2021

## 2022-03-07 NOTE — PROGRESS NOTES
ROOM # 1  Identified pt with two pt identifiers(name and ). Reviewed record in preparation for visit and have obtained necessary documentation. Chief Complaint   Patient presents with   [facility] preferred language for health care discussion is english/other. Is the patient using any DME equipment during OV? NO    Krystyna Burrows is due for:  Health Maintenance Due   Topic    Foot Exam Q1     DTaP/Tdap/Td series (1 - Tdap)    Shingrix Vaccine Age 50> (1 of 2)    Pneumococcal 65+ years (1 of 1 - PPSV23)    Flu Vaccine (1)    MICROALBUMIN Q1     Lipid Screen     Medicare Yearly Exam     A1C test (Diabetic or Prediabetic)      Health Maintenance reviewed and discussed per provider  Please order/place referral if appropriate. 1. For patients aged 39-70: Has the patient had a colonoscopy? NA - based on age   If the patient is female:    2. For patients aged 41-77: Has the patient had a mammogram within the past 2 years? NA - based on age    1. For patients aged 21-65: Has the patient had a pap smear? NA - based on age  Advance Directive:  1. Do you have an advance directive in place? Patient Reply: NO    2. If not, would you like material regarding how to put one in place? NO    Coordination of Care:  1. Have you been to the ER, urgent care clinic since your last visit? Hospitalized since your last visit? NO    2. Have you seen or consulted any other health care providers outside of the [address] [place] [address] since your last visit? Include any pap smears or colon screening. YES    Patient is accompanied by self I have received verbal consent from Krystyna Burrows to discuss any/all medical information while they are present in the room.     Depression Screening:  3 most recent PHQ Screens 5/3/2021 10/5/2020   Little interest or pleasure in doing things Nearly every day Several days   Feeling down, depressed, irritable, or hopeless Nearly every day Several days Total Score PHQ 2 6 2     Abuse Screening:  Abuse Screening Questionnaire 3/7/2022 5/3/2021   Do you ever feel afraid of your partner? N N   Are you in a relationship with someone who physically or mentally threatens you? N N   Is it safe for you to go home? Y Y     Fall Risk  Fall Risk Assessment, last 12 mths 3/7/2022 8/24/2021 5/18/2021 5/3/2021 1/22/2021 12/22/2020 10/12/2020   Able to walk? Yes No Yes Yes Yes Yes Yes   Fall in past 12 months? 0 - 0 0 0 No Yes   Do you feel unsteady?  - - 0 0 0 - -   Are you worried about falling - - 0 0 0 - -   Number of falls in past 12 months - - - - - - 2   Fall with injury? - - - - - - 0     Recent Travel Screening and Travel History documentation     Travel Screening     No screening recorded since 03/06/22 0000     Travel History   Travel since 02/07/22    No documented travel since 02/07/22

## 2022-03-15 LAB
ALBUMIN SERPL-MCNC: 4.5 G/DL (ref 3.7–4.7)
ALBUMIN/CREAT UR: 17 MG/G CREAT (ref 0–29)
ALBUMIN/GLOB SERPL: 1.7 {RATIO} (ref 1.2–2.2)
ALP SERPL-CCNC: 77 IU/L (ref 44–121)
ALT SERPL-CCNC: 28 IU/L (ref 0–32)
AST SERPL-CCNC: 28 IU/L (ref 0–40)
BASOPHILS # BLD AUTO: 0 X10E3/UL (ref 0–0.2)
BASOPHILS NFR BLD AUTO: 0 %
BILIRUB SERPL-MCNC: 0.3 MG/DL (ref 0–1.2)
BUN SERPL-MCNC: 10 MG/DL (ref 8–27)
BUN/CREAT SERPL: 13 (ref 12–28)
CALCIUM SERPL-MCNC: 9.5 MG/DL (ref 8.7–10.3)
CHLORIDE SERPL-SCNC: 91 MMOL/L (ref 96–106)
CHOLEST SERPL-MCNC: 113 MG/DL (ref 100–199)
CO2 SERPL-SCNC: 19 MMOL/L (ref 20–29)
CREAT SERPL-MCNC: 0.75 MG/DL (ref 0.57–1)
CREAT UR-MCNC: 98.3 MG/DL
DRUGS UR: NORMAL
EGFR: 81 ML/MIN/1.73
EOSINOPHIL # BLD AUTO: 0 X10E3/UL (ref 0–0.4)
EOSINOPHIL NFR BLD AUTO: 1 %
ERYTHROCYTE [DISTWIDTH] IN BLOOD BY AUTOMATED COUNT: 13.4 % (ref 11.7–15.4)
EST. AVERAGE GLUCOSE BLD GHB EST-MCNC: 146 MG/DL
GLOBULIN SER CALC-MCNC: 2.6 G/DL (ref 1.5–4.5)
GLUCOSE SERPL-MCNC: 137 MG/DL (ref 65–99)
HBA1C MFR BLD: 6.7 % (ref 4.8–5.6)
HCT VFR BLD AUTO: 38.3 % (ref 34–46.6)
HDLC SERPL-MCNC: 47 MG/DL
HGB BLD-MCNC: 12.7 G/DL (ref 11.1–15.9)
IMM GRANULOCYTES # BLD AUTO: 0 X10E3/UL (ref 0–0.1)
IMM GRANULOCYTES NFR BLD AUTO: 0 %
IMP & REVIEW OF LAB RESULTS: NORMAL
LDLC SERPL CALC-MCNC: 45 MG/DL (ref 0–99)
LYMPHOCYTES # BLD AUTO: 0.8 X10E3/UL (ref 0.7–3.1)
LYMPHOCYTES NFR BLD AUTO: 20 %
MCH RBC QN AUTO: 29.4 PG (ref 26.6–33)
MCHC RBC AUTO-ENTMCNC: 33.2 G/DL (ref 31.5–35.7)
MCV RBC AUTO: 89 FL (ref 79–97)
MICROALBUMIN UR-MCNC: 16.7 UG/ML
MONOCYTES # BLD AUTO: 0.4 X10E3/UL (ref 0.1–0.9)
MONOCYTES NFR BLD AUTO: 10 %
NEUTROPHILS # BLD AUTO: 2.7 X10E3/UL (ref 1.4–7)
NEUTROPHILS NFR BLD AUTO: 69 %
PLATELET # BLD AUTO: 177 X10E3/UL (ref 150–450)
POTASSIUM SERPL-SCNC: 3.9 MMOL/L (ref 3.5–5.2)
PROT SERPL-MCNC: 7.1 G/DL (ref 6–8.5)
RBC # BLD AUTO: 4.32 X10E6/UL (ref 3.77–5.28)
SODIUM SERPL-SCNC: 129 MMOL/L (ref 134–144)
T4 FREE SERPL-MCNC: 1.44 NG/DL (ref 0.82–1.77)
TRIGL SERPL-MCNC: 116 MG/DL (ref 0–149)
TSH SERPL DL<=0.005 MIU/L-ACNC: 3.21 UIU/ML (ref 0.45–4.5)
VLDLC SERPL CALC-MCNC: 21 MG/DL (ref 5–40)
WBC # BLD AUTO: 3.8 X10E3/UL (ref 3.4–10.8)

## 2022-03-16 NOTE — PROGRESS NOTES
Patients toxassure came back positive with THC and clonazepam and not lyrica unfortunately I cannot write anymore lyrica prescriptions for this patient due to the above not mentioned and the THC  Sodium is chronically low - does she drink alcohol if so how much?    A1C is 6.7 recommend she only take metformin 500 mg twice a day and increase by one tablet weekly until she reaches 1000 mg twice a day - also want her to discontinue the janumet

## 2022-03-18 PROBLEM — E11.42 TYPE 2 DIABETES MELLITUS WITH DIABETIC POLYNEUROPATHY, WITHOUT LONG-TERM CURRENT USE OF INSULIN (HCC): Status: ACTIVE | Noted: 2020-10-05

## 2022-03-19 PROBLEM — I51.89 GRADE I DIASTOLIC DYSFUNCTION: Status: ACTIVE | Noted: 2020-12-22

## 2022-03-19 PROBLEM — F41.9 ANXIETY AND DEPRESSION: Status: ACTIVE | Noted: 2020-10-05

## 2022-03-19 PROBLEM — E03.9 ACQUIRED HYPOTHYROIDISM: Status: ACTIVE | Noted: 2020-10-05

## 2022-03-19 PROBLEM — F32.A ANXIETY AND DEPRESSION: Status: ACTIVE | Noted: 2020-10-05

## 2022-03-19 PROBLEM — M81.0 SENILE OSTEOPOROSIS: Status: ACTIVE | Noted: 2020-12-22

## 2022-03-25 ENCOUNTER — TELEPHONE (OUTPATIENT)
Dept: INTERNAL MEDICINE CLINIC | Age: 82
End: 2022-03-25

## 2022-03-25 NOTE — TELEPHONE ENCOUNTER
Please return call to patient regarding her lab work that was done please return call when available

## 2022-03-28 NOTE — TELEPHONE ENCOUNTER
Informed patient of Cecelia Rhodes result note. Patient denies any THC or klonopin  Uses. Pt states she takes her lyrica every day as prescribe. Also denies any alcohol uses.

## 2022-03-29 ENCOUNTER — COMPREHENSIVE EXAM (OUTPATIENT)
Dept: URBAN - METROPOLITAN AREA CLINIC 1 | Facility: CLINIC | Age: 82
End: 2022-03-29

## 2022-03-29 DIAGNOSIS — H35.033: ICD-10-CM

## 2022-03-29 DIAGNOSIS — H47.213: ICD-10-CM

## 2022-03-29 DIAGNOSIS — H16.143: ICD-10-CM

## 2022-03-29 DIAGNOSIS — H40.023: ICD-10-CM

## 2022-03-29 DIAGNOSIS — Z96.1: ICD-10-CM

## 2022-03-29 DIAGNOSIS — H04.123: ICD-10-CM

## 2022-03-29 DIAGNOSIS — E11.9: ICD-10-CM

## 2022-03-29 PROCEDURE — 99214 OFFICE O/P EST MOD 30 MIN: CPT

## 2022-03-29 PROCEDURE — 92133 CPTRZD OPH DX IMG PST SGM ON: CPT

## 2022-03-29 ASSESSMENT — TONOMETRY
OD_IOP_MMHG: 12
OS_IOP_MMHG: 12

## 2022-03-29 ASSESSMENT — VISUAL ACUITY
OD_SC: 20/25
OS_SC: 20/25

## 2022-03-29 NOTE — PATIENT DISCUSSION
(CD 0.80 OU) - No progression by OCT. IOP stable. Patient is considered high risk. Condition was discussed with patient and patient understands. Will continue to monitor patient for any progression in condition. Patient was advised to call us with any problems, questions, or concerns.

## 2022-04-02 ASSESSMENT — VISUAL ACUITY
OD_GLARE: 20/200
OS_CC: 20/25+2
OD_CC: 20/30
OS_CC: 20/30
OS_CC: 20/30
OD_CC: 20/20-1
OS_GLARE: 20/200
OD_CC: 20/30

## 2022-04-02 ASSESSMENT — TONOMETRY
OD_IOP_MMHG: 12
OS_IOP_MMHG: 12
OD_IOP_MMHG: 11
OD_IOP_MMHG: 12
OS_IOP_MMHG: 12
OS_IOP_MMHG: 12

## 2022-04-07 DIAGNOSIS — E11.42 TYPE 2 DIABETES MELLITUS WITH DIABETIC POLYNEUROPATHY, WITHOUT LONG-TERM CURRENT USE OF INSULIN (HCC): Primary | ICD-10-CM

## 2022-04-07 RX ORDER — BLOOD-GLUCOSE,RECEIVER,CONT
EACH MISCELLANEOUS
Qty: 1 EACH | Refills: 0 | Status: SHIPPED | OUTPATIENT
Start: 2022-04-07 | End: 2022-06-07 | Stop reason: ALTCHOICE

## 2022-04-07 RX ORDER — BLOOD-GLUCOSE TRANSMITTER
EACH MISCELLANEOUS
Qty: 1 EACH | Refills: 0 | Status: SHIPPED | OUTPATIENT
Start: 2022-04-07 | End: 2022-06-07

## 2022-04-07 RX ORDER — BLOOD-GLUCOSE SENSOR
EACH MISCELLANEOUS
Qty: 3 EACH | Refills: 2 | Status: SHIPPED | OUTPATIENT
Start: 2022-04-07 | End: 2022-06-07 | Stop reason: ALTCHOICE

## 2022-04-18 DIAGNOSIS — M81.0 SENILE OSTEOPOROSIS: ICD-10-CM

## 2022-04-18 DIAGNOSIS — I10 ESSENTIAL HYPERTENSION: ICD-10-CM

## 2022-04-18 DIAGNOSIS — E03.9 ACQUIRED HYPOTHYROIDISM: ICD-10-CM

## 2022-04-18 NOTE — TELEPHONE ENCOUNTER
Pharmacy requesting refills    Requested Prescriptions     Pending Prescriptions Disp Refills    levothyroxine (SYNTHROID) 50 mcg tablet 90 Tablet 1     Sig: Take 1 Tablet by mouth Daily (before breakfast).  alendronate (FOSAMAX) 70 mg tablet 12 Tablet 3     Sig: Take 1 Tablet by mouth every seven (7) days.  amLODIPine (NORVASC) 2.5 mg tablet 90 Tablet 1     Si Tablet daily.  atenoloL (TENORMIN) 50 mg tablet 90 Tablet 2     Sig: Take 1 Tablet by mouth daily.  losartan (COZAAR) 100 mg tablet 90 Tablet 3     Sig: Take 1 Tablet by mouth daily.  simvastatin (ZOCOR) 10 mg tablet 90 Tablet 3     Sig: Take 1 Tablet by mouth nightly.

## 2022-04-19 DIAGNOSIS — E11.42 TYPE 2 DIABETES MELLITUS WITH DIABETIC POLYNEUROPATHY, WITHOUT LONG-TERM CURRENT USE OF INSULIN (HCC): Primary | ICD-10-CM

## 2022-04-19 RX ORDER — SIMVASTATIN 10 MG/1
10 TABLET, FILM COATED ORAL
Qty: 90 TABLET | Refills: 3 | Status: SHIPPED | OUTPATIENT
Start: 2022-04-19

## 2022-04-19 RX ORDER — ALENDRONATE SODIUM 70 MG/1
70 TABLET ORAL
Qty: 12 TABLET | Refills: 3 | Status: SHIPPED | OUTPATIENT
Start: 2022-04-19

## 2022-04-19 RX ORDER — CALCIUM CITRATE/VITAMIN D3 200MG-6.25
TABLET ORAL
Qty: 100 STRIP | Refills: 5 | Status: SHIPPED | OUTPATIENT
Start: 2022-04-19 | End: 2022-07-27 | Stop reason: SDUPTHER

## 2022-04-19 RX ORDER — ISOPROPYL ALCOHOL 70 ML/100ML
SWAB TOPICAL
Qty: 200 PAD | Refills: 5 | Status: SHIPPED | OUTPATIENT
Start: 2022-04-19

## 2022-04-19 RX ORDER — AMLODIPINE BESYLATE 2.5 MG/1
TABLET ORAL
Qty: 90 TABLET | Refills: 1 | Status: SHIPPED | OUTPATIENT
Start: 2022-04-19 | End: 2022-05-10 | Stop reason: DRUGHIGH

## 2022-04-19 RX ORDER — BLOOD-GLUCOSE METER
EACH MISCELLANEOUS
Qty: 1 EACH | Refills: 4 | Status: SHIPPED | OUTPATIENT
Start: 2022-04-19 | End: 2022-07-27 | Stop reason: SDUPTHER

## 2022-04-19 RX ORDER — LEVOTHYROXINE SODIUM 50 UG/1
50 TABLET ORAL
Qty: 90 TABLET | Refills: 1 | Status: SHIPPED | OUTPATIENT
Start: 2022-04-19 | End: 2022-09-12

## 2022-04-19 RX ORDER — ATENOLOL 50 MG/1
50 TABLET ORAL DAILY
Qty: 90 TABLET | Refills: 2 | Status: SHIPPED | OUTPATIENT
Start: 2022-04-19

## 2022-04-19 RX ORDER — VALACYCLOVIR HYDROCHLORIDE 1 G/1
500 TABLET, FILM COATED ORAL DAILY
Qty: 90 TABLET | Refills: 0 | Status: SHIPPED | OUTPATIENT
Start: 2022-04-19 | End: 2022-09-12

## 2022-04-19 RX ORDER — LOSARTAN POTASSIUM 100 MG/1
100 TABLET ORAL DAILY
Qty: 90 TABLET | Refills: 3 | Status: SHIPPED | OUTPATIENT
Start: 2022-04-19

## 2022-04-19 NOTE — TELEPHONE ENCOUNTER
Pharmacy requesting refill    Requested Prescriptions     Pending Prescriptions Disp Refills    valACYclovir (VALTREX) 1 gram tablet 90 Tablet 0     Sig: Take 0.5 Tablets by mouth daily. Take 500 mg by mouth.

## 2022-05-10 ENCOUNTER — OFFICE VISIT (OUTPATIENT)
Dept: CARDIOLOGY CLINIC | Age: 82
End: 2022-05-10
Payer: MEDICARE

## 2022-05-10 VITALS
DIASTOLIC BLOOD PRESSURE: 72 MMHG | SYSTOLIC BLOOD PRESSURE: 152 MMHG | RESPIRATION RATE: 16 BRPM | HEART RATE: 73 BPM | OXYGEN SATURATION: 98 % | TEMPERATURE: 97.5 F | BODY MASS INDEX: 29.85 KG/M2 | WEIGHT: 158 LBS

## 2022-05-10 DIAGNOSIS — E78.00 PURE HYPERCHOLESTEROLEMIA: ICD-10-CM

## 2022-05-10 DIAGNOSIS — I10 ESSENTIAL HYPERTENSION WITH GOAL BLOOD PRESSURE LESS THAN 140/90: Primary | ICD-10-CM

## 2022-05-10 PROCEDURE — G9717 DOC PT DX DEP/BP F/U NT REQ: HCPCS | Performed by: INTERNAL MEDICINE

## 2022-05-10 PROCEDURE — G8754 DIAS BP LESS 90: HCPCS | Performed by: INTERNAL MEDICINE

## 2022-05-10 PROCEDURE — G8536 NO DOC ELDER MAL SCRN: HCPCS | Performed by: INTERNAL MEDICINE

## 2022-05-10 PROCEDURE — G8417 CALC BMI ABV UP PARAM F/U: HCPCS | Performed by: INTERNAL MEDICINE

## 2022-05-10 PROCEDURE — G8428 CUR MEDS NOT DOCUMENT: HCPCS | Performed by: INTERNAL MEDICINE

## 2022-05-10 PROCEDURE — 1101F PT FALLS ASSESS-DOCD LE1/YR: CPT | Performed by: INTERNAL MEDICINE

## 2022-05-10 PROCEDURE — 99213 OFFICE O/P EST LOW 20 MIN: CPT | Performed by: INTERNAL MEDICINE

## 2022-05-10 PROCEDURE — 1090F PRES/ABSN URINE INCON ASSESS: CPT | Performed by: INTERNAL MEDICINE

## 2022-05-10 PROCEDURE — G8753 SYS BP > OR = 140: HCPCS | Performed by: INTERNAL MEDICINE

## 2022-05-10 RX ORDER — AMLODIPINE BESYLATE 5 MG/1
5 TABLET ORAL DAILY
Qty: 90 TABLET | Refills: 3 | Status: SHIPPED | OUTPATIENT
Start: 2022-05-10

## 2022-05-10 NOTE — PROGRESS NOTES
Identified pt with two pt identifiers(name and ). Reviewed record in preparation for visit and have obtained necessary documentation. Sky Guzman presents today for   Chief Complaint   Patient presents with    Follow-up     6m       Pt denies  DIZZINESS, SOB, CHEST PAIN/ PRESSURE, FATIGUE/WEAKNESS, HEADACHES, SWELLING. Sky Guzman preferred language for health care discussion is english/other. Personal Protective Equipment:   Personal Protective Equipment was used including: mask-surgical and hands-gloves. Patient was placed on no precaution(s). Patient was masked. Precautions:   Patient currently on None  Patient currently roomed with door closed. Is someone accompanying this pt? no    Is the patient using any DME equipment during [address]? no    Depression Screening:  3 most recent PHQ Screens 5/10/2022   Little interest or pleasure in doing things Not at all   Feeling down, depressed, irritable, or hopeless Not at all   Total Score PHQ 2 0       Learning Assessment:  No flowsheet data found. Abuse Screening:  Abuse Screening Questionnaire 3/7/2022   Do you ever feel afraid of your partner? N   Are you in a relationship with someone who physically or mentally threatens you? N   Is it safe for you to go home? Y       Fall Risk  Fall Risk Assessment, last 12 mths 5/10/2022   Able to walk? Yes   Fall in past 12 months? -   Do you feel unsteady? -   Are you worried about falling -   Number of falls in past 12 months -   Fall with injury? -       Pt currently taking Anticoagulant therapy? no  Pt currently taking Antiplatelet therapy? no    Coordination of Care:  1. Have you been to the ER, urgent care clinic since your last visit? Hospitalized since your last visit? no    2. Have you seen or consulted any other health care providers outside of the [address] [place] [address] since your last visit? Include any pap smears or colon screening.  no      Please see Red banners under Allergies and Med Rec to remove outside inquires. All correct information has been verified with patient and added to chart.      Medication's patient's would liked removed has been marked not taking to be removed per Verbal order and read back per Meryle Ran, MD

## 2022-05-10 NOTE — LETTER
5/10/2022    Patient: Adrian Torres   YOB: 1940   Date of Visit: 5/10/2022     Rebeca Rodriguez NP  [address] [id number]  [facility] [address]    Dear Rebeca Rodriguez NP,      Thank you for referring Ms. Catherine Degroot to [address] [place] [address] for evaluation. My notes for this consultation are attached. If you have questions, please do not hesitate to call me. I look forward to following your patient along with you.       Sincerely,    Remington Whitley MD

## 2022-05-10 NOTE — PROGRESS NOTES
[facility]    Ms. Veronique Faria is 80 y.o. female with history of diabetes, hypertension, hyperlipidemia and hypothyroidism    Patient is here today for cardiac evaluation. She denies any prior history of MI or CHF. Patient denies any chest pain or chest tightness since last visit. She denies any symptoms that is concerning for unstable angina or decompensated heart failure. She denies presyncope or syncope. She has occasional mild lower extremity swelling. Denies any nausea, vomiting, abdominal pain, fever, chills, sputum production. No hematuria or other bleeding complaints  BP at home usually 798372 systolic    Past Medical History:   Diagnosis Date    Acid reflux     Breast cancer ([facility] 2002    Stage 1, right    Depression     Diabetes ([facility]     Hypercholesterolemia     Hypertension     Hypothyroidism     Lyme disease     Neuropathy     OAB (overactive bladder)     Osteoporosis     Post-menopausal     [facility]       Review of Systems:  Cardiac symptoms as noted above in HPI. All others negative. Current Outpatient Medications   Medication Sig    levothyroxine (SYNTHROID) 50 mcg tablet Take 1 Tablet by mouth Daily (before breakfast).  alendronate (FOSAMAX) 70 mg tablet Take 1 Tablet by mouth every seven (7) days.  amLODIPine (NORVASC) 2.5 mg tablet TAKE 1 TABLET EVERY DAY    atenoloL (TENORMIN) 50 mg tablet Take 1 Tablet by mouth daily.  losartan (COZAAR) 100 mg tablet Take 1 Tablet by mouth daily.  simvastatin (ZOCOR) 10 mg tablet Take 1 Tablet by mouth nightly.  True Metrix Glucose Test Strip strip Check BS BID    alcohol swabs (BD Single Use Swabs Regular) padm To be used to check BS BID    Blood Glucose Control, Low (True Metrix Level 1) soln To be used to check controls    valACYclovir (VALTREX) 1 gram tablet Take 0.5 Tablets by mouth daily. Take 500 mg by mouth.     Blood-Glucose Meter,Continuous (Dexcom G6 ) misc Check BS BID    Blood-Glucose Transmitter (Dexcom G6 Transmitter) pepe Check BS BID    Blood-Glucose Sensor (Dexcom G6 Sensor) pepe Place sensor to be used in with dexcom to check BS BID    ZINC OXIDE-COD LIVER OIL EX Apply  to affected area as needed for Skin Irritation.  magnesium sulfate, laxative, (EPSOM SALTS) 495 mg/5 gram gran Take  by mouth as needed.  pregabalin (Lyrica) 75 mg capsule Take 1 Capsule by mouth daily. Max Daily Amount: 75 mg.    metFORMIN (GLUCOPHAGE) 500 mg tablet Take 1 Tablet by mouth two (2) times daily (with meals).  pantoprazole (PROTONIX) 40 mg tablet Take 1 Tablet by mouth daily.  Blood-Glucose Meter (True Metrix Air Glucose Meter) monitoring kit Use once daily    SITagliptin-metFORMIN (Janumet)  mg per tablet Take 1 Tab by mouth daily.  methylphenidate HCl (RITALIN) 20 mg tablet     Walker (Ultra-Light Rollator) misc Use as needed (Patient not taking: Reported on 3/7/2022)    ascorbic acid, vitamin C, (VITAMIN C) 500 mg tablet Take 500 mg by mouth daily.  oxybutynin chloride XL (DITROPAN XL) 10 mg CR tablet     Osphena 60 mg tab tablet TAKE ONE TABLET BY MOUTH EVERY DAY WITH FOOD    calcium-vitamin D 600 mg(1,500mg) -200 unit tab Take 2 Tabs by mouth two (2) times a day.  TRUEplus Lancets 28 gauge misc     OLANZapine (ZyPREXA) 5 mg tablet     folic acid (FOLVITE) 759 mcg tablet Take 0.8 mg by mouth daily.  clonazePAM (KlonoPIN) 0.5 mg tablet     acetaminophen (TYLENOL EXTRA STRENGTH) 500 mg tablet Take 2 Tabs by mouth every six (6) hours as needed for Pain.  omega-3 fatty acids-vitamin e 1,000 mg cap Take 1 Cap by mouth.  multivitamin (ONE A DAY) tablet Take 1 Tab by mouth daily.  venlafaxine-SR (EFFEXOR-XR) 150 mg capsule Take  by mouth two (2) times a day.  aspirin 81 mg chewable tablet Take 81 mg by mouth daily. No current facility-administered medications for this visit. Past Surgical History:   Procedure Laterality Date    HX APPENDECTOMY      HX BREAST LUMPECTOMY  2002    RIGHT BREAST + 3 lymph nodes     HX CHOLECYSTECTOMY  2013    HX HYSTERECTOMY  2010    PRECANCEROUS CELLS ON RIGHT OVARY    HX ORTHOPAEDIC  2018    Right thumb repair     HX SHOULDER ARTHROSCOPY  2013    HX TONSILLECTOMY  AGE 15    1953       Allergies and Sensitivities:  Allergies   Allergen Reactions    Codeine Itching    Pcn [Penicillins] Hives and Swelling    Meperidine Itching       Family History:  Family History   Problem Relation Age of Onset    Heart Disease Mother     Heart Disease Father     Hypertension Sister     Heart Disease Brother        Social History:  Social History     Tobacco Use    Smoking status: Never Smoker    Smokeless tobacco: Never Used   Vaping Use    Vaping Use: Never used   Substance Use Topics    Alcohol use: No    Drug use: No     She  reports that she has never smoked. She has never used smokeless tobacco.  She  reports no history of alcohol use. Physical Exam:  BP Readings from Last 3 Encounters:   03/07/22 (!) 143/77   08/24/21 (!) 144/69   06/22/21 (!) 160/84         Pulse Readings from Last 3 Encounters:   03/07/22 73   08/24/21 74   05/18/21 76          Wt Readings from Last 3 Encounters:   03/07/22 68.9 kg (152 lb)   08/24/21 71.6 kg (157 lb 12.8 oz)   06/22/21 75.8 kg (167 lb)       Constitutional: Oriented to person, place, and time. HENT: Head: Normocephalic and atraumatic. Neck: No JVD present. Cardiovascular: Regular rhythm. No murmur, gallop or rubs appreciated  Lung: Breath sounds normal. No respiratory distress. No ronchi or rales appreciated  Abdominal: No tenderness. No rebound and no guarding. Musculoskeletal: There is trace lower extremity edema.  No cynosis    LABS:   @  Lab Results   Component Value Date/Time    WBC 3.8 03/07/2022 02:44 AM    HGB 12.7 03/07/2022 02:44 AM    HCT 38.3 03/07/2022 02:44 AM    PLATELET 890 03/07/2022 02:44 AM    MCV 89 03/07/2022 02:44 AM     Lab Results   Component Value Date/Time    Sodium 129 (L) 03/07/2022 02:44 AM    Potassium 3.9 03/07/2022 02:44 AM    Chloride 91 (L) 03/07/2022 02:44 AM    CO2 19 (L) 03/07/2022 02:44 AM    Glucose 137 (H) 03/07/2022 02:44 AM    BUN 10 03/07/2022 02:44 AM    Creatinine 0.75 03/07/2022 02:44 AM     Lipids Latest Ref Rng & Units 3/7/2022 10/12/2020   Chol, Total 100 - 199 mg/dL 113 139   HDL >39 mg/dL 47 63(H)   LDL 0 - 99 mg/dL 45 44.6   Trig 0 - 149 mg/dL 116 157(H)   Chol/HDL Ratio 0 - 5.0   - 2.2   Some recent data might be hidden     Lab Results   Component Value Date/Time    ALT (SGPT) 28 03/07/2022 02:44 AM     Lab Results   Component Value Date/Time    Hemoglobin A1c 6.7 (H) 03/07/2022 02:44 AM     Lab Results   Component Value Date/Time    TSH 3.210 03/07/2022 02:44 AM       EKG  05/18/2021: Sinus rhythm at 72 bpm.  No ST changes of ischemia. No Q waves    ECHO (06/2021)  Left Ventricle Normal cavity size, wall thickness and systolic function (ejection fraction normal). The estimated EF is 55 - 60%. Visually measured ejection fraction. There is mild (grade 1) left ventricular diastolic dysfunction. Wall Scoring The left ventricular wall motion is normal.            Left Atrium Mildly dilated left atrium. Left Atrium volume index is 36.01 mL/m2. Right Ventricle Normal cavity size and global systolic function. Right Atrium Normal cavity size. Aortic Valve Trileaflet valve structure and no stenosis. There is noncoronary leaflet calcification. Trace aortic valve regurgitation. Mitral Valve No stenosis. Mitral valve non-specific thickening. Mild regurgitation. Tricuspid Valve Normal valve structure and no stenosis. Trace regurgitation. Pulmonic Valve Pulmonic valve not well visualized. No stenosis. Trace regurgitation. Aorta Normal aortic root. Mildly dilated ascending aorta; diameter is 3.6 cm.    Pulmonary Artery Pulmonary arterial systolic pressure (PASP) is 15 mmHg. Pulmonary hypertension not suggested by Doppler findings. IVC/Hepatic Veins Normal central venous pressure (3 mmHg); IVC diameter is less than 21 mm and collapses more than 50% with respiration. STRESS TEST (EST, PHARM, NUC, ECHO etc)    CATHETERIZATION    IMPRESSION & PLAN:  Ms. Gardner Child is 80 y.o. female with diabetes, hypertension hyperlipidemia    Hypertension:  /80 mm Hg. Increase amlodipine to 5 mg daily. Continue rest same dose. BP check at home. Hyperlipidemia:  Currently on low-dose of simvastatin. Last LDL 45. Continue same    This plan was discussed with patient who is in agreement. Thank you for allowing me to participate in patient care. Please feel free to call me if you have any question or concern. Macrina Gomez MD  Please note: This document has been produced using voice recognition software. Unrecognized errors in transcription may be present.

## 2022-05-10 NOTE — PATIENT INSTRUCTIONS
Return in 3 weeks for a BP check         New Medication/Medication Changes  Increase Norvasc to 5 mg daily    **please allow 24-48 hrs for medication to be escribed to pharmacy** If you need any refills on medications please contact your pharmacy so that the request can be escribed to the provider for review.

## 2022-05-17 RX ORDER — METFORMIN HYDROCHLORIDE 500 MG/1
TABLET ORAL
Qty: 180 TABLET | Refills: 0 | Status: SHIPPED | OUTPATIENT
Start: 2022-05-17 | End: 2022-06-21 | Stop reason: SDUPTHER

## 2022-06-06 ENCOUNTER — CLINICAL SUPPORT (OUTPATIENT)
Dept: CARDIOLOGY CLINIC | Age: 82
End: 2022-06-06

## 2022-06-06 VITALS — DIASTOLIC BLOOD PRESSURE: 82 MMHG | HEART RATE: 71 BPM | SYSTOLIC BLOOD PRESSURE: 138 MMHG

## 2022-06-06 DIAGNOSIS — Z01.30 BLOOD PRESSURE CHECK: Primary | ICD-10-CM

## 2022-06-06 NOTE — PROGRESS NOTES
Estuardo Roberts was seen in our office today for BP evaluation. Listed below are the patients current meds:      Current Outpatient Medications:     metFORMIN (GLUCOPHAGE) 500 mg tablet, TAKE 1 TABLET TWICE DAILY WITH MEALS, Disp: 180 Tablet, Rfl: 0    amLODIPine (NORVASC) 5 mg tablet, Take 1 Tablet by mouth daily. , Disp: 90 Tablet, Rfl: 3    atenoloL (TENORMIN) 50 mg tablet, Take 1 Tablet by mouth daily. , Disp: 90 Tablet, Rfl: 2    losartan (COZAAR) 100 mg tablet, Take 1 Tablet by mouth daily. , Disp: 90 Tablet, Rfl: 3    simvastatin (ZOCOR) 10 mg tablet, Take 1 Tablet by mouth nightly., Disp: 90 Tablet, Rfl: 3    aspirin 81 mg chewable tablet, Take 81 mg by mouth daily. , Disp: , Rfl:     COQ10, UBIQUINOL, PO, Take  by mouth., Disp: , Rfl:     levothyroxine (SYNTHROID) 50 mcg tablet, Take 1 Tablet by mouth Daily (before breakfast). , Disp: 90 Tablet, Rfl: 1    alendronate (FOSAMAX) 70 mg tablet, Take 1 Tablet by mouth every seven (7) days. , Disp: 12 Tablet, Rfl: 3    True Metrix Glucose Test Strip strip, Check BS BID, Disp: 100 Strip, Rfl: 5    alcohol swabs (BD Single Use Swabs Regular) padm, To be used to check BS BID, Disp: 200 Pad, Rfl: 5    Blood Glucose Control, Low (True Metrix Level 1) soln, To be used to check controls, Disp: 1 Each, Rfl: 4    valACYclovir (VALTREX) 1 gram tablet, Take 0.5 Tablets by mouth daily. Take 500 mg by mouth., Disp: 90 Tablet, Rfl: 0    Blood-Glucose Meter,Continuous (Dexcom G6 ) misc, Check BS BID, Disp: 1 Each, Rfl: 0    Blood-Glucose Transmitter (Dexcom G6 Transmitter) pepe, Check BS BID, Disp: 1 Each, Rfl: 0    Blood-Glucose Sensor (Dexcom G6 Sensor) pepe, Place sensor to be used in with dexcom to check BS BID, Disp: 3 Each, Rfl: 2    ZINC OXIDE-COD LIVER OIL EX, Apply  to affected area as needed for Skin Irritation. , Disp: , Rfl:     magnesium sulfate, laxative, (EPSOM SALTS) 495 mg/5 gram gran, Take  by mouth as needed. , Disp: , Rfl:    pregabalin (Lyrica) 75 mg capsule, Take 1 Capsule by mouth daily. Max Daily Amount: 75 mg., Disp: 90 Capsule, Rfl: 0    pantoprazole (PROTONIX) 40 mg tablet, Take 1 Tablet by mouth daily. , Disp: 90 Tablet, Rfl: 1    Blood-Glucose Meter (True Metrix Air Glucose Meter) monitoring kit, Use once daily, Disp: 1 Kit, Rfl: 0    methylphenidate HCl (RITALIN) 20 mg tablet, , Disp: , Rfl:     Walker (Ultra-Light Rollator) misc, Use as needed (Patient not taking: Reported on 3/7/2022), Disp: 1 Each, Rfl: 0    ascorbic acid, vitamin C, (VITAMIN C) 500 mg tablet, Take 500 mg by mouth daily. , Disp: , Rfl:     oxybutynin chloride XL (DITROPAN XL) 10 mg CR tablet, , Disp: , Rfl:     Osphena 60 mg tab tablet, TAKE ONE TABLET BY MOUTH EVERY DAY WITH FOOD, Disp: , Rfl:     calcium-vitamin D 600 mg(1,500mg) -200 unit tab, Take 2 Tabs by mouth two (2) times a day., Disp: , Rfl:     TRUEplus Lancets 28 gauge misc, , Disp: , Rfl:     OLANZapine (ZyPREXA) 5 mg tablet, , Disp: , Rfl:     folic acid (FOLVITE) 885 mcg tablet, Take 0.8 mg by mouth daily. , Disp: , Rfl:     clonazePAM (KlonoPIN) 0.5 mg tablet, , Disp: , Rfl:     acetaminophen (TYLENOL EXTRA STRENGTH) 500 mg tablet, Take 2 Tabs by mouth every six (6) hours as needed for Pain., Disp: 40 Tab, Rfl: 0    omega-3 fatty acids-vitamin e 1,000 mg cap, Take 1 Cap by mouth., Disp: , Rfl:     multivitamin (ONE A DAY) tablet, Take 1 Tab by mouth daily. , Disp: , Rfl:     venlafaxine-SR (EFFEXOR-XR) 150 mg capsule, Take  by mouth two (2) times a day.  , Disp: , Rfl:     PT has taken all medications as directed. No current facility-administered medications for this visit. Visit Vitals  /82   Pulse 71       Plan:     Patient to continue current medications. Advised patient that I would forward to Dr. Guillermo Styles  for review and further instructions. Advised patient that we would call within 24-48 hours with any changes. Patient verbalized understanding.

## 2022-06-07 ENCOUNTER — OFFICE VISIT (OUTPATIENT)
Dept: INTERNAL MEDICINE CLINIC | Age: 82
End: 2022-06-07
Payer: MEDICARE

## 2022-06-07 VITALS
HEIGHT: 61 IN | RESPIRATION RATE: 20 BRPM | SYSTOLIC BLOOD PRESSURE: 157 MMHG | TEMPERATURE: 96.8 F | OXYGEN SATURATION: 98 % | DIASTOLIC BLOOD PRESSURE: 80 MMHG | HEART RATE: 67 BPM | BODY MASS INDEX: 30.02 KG/M2 | WEIGHT: 159 LBS

## 2022-06-07 DIAGNOSIS — M25.511 BILATERAL SHOULDER PAIN, UNSPECIFIED CHRONICITY: ICD-10-CM

## 2022-06-07 DIAGNOSIS — M25.512 BILATERAL SHOULDER PAIN, UNSPECIFIED CHRONICITY: ICD-10-CM

## 2022-06-07 DIAGNOSIS — E11.42 TYPE 2 DIABETES MELLITUS WITH DIABETIC POLYNEUROPATHY, WITHOUT LONG-TERM CURRENT USE OF INSULIN (HCC): Primary | ICD-10-CM

## 2022-06-07 DIAGNOSIS — I10 ESSENTIAL HYPERTENSION: ICD-10-CM

## 2022-06-07 DIAGNOSIS — E78.00 HYPERCHOLESTEROLEMIA: ICD-10-CM

## 2022-06-07 DIAGNOSIS — E03.9 ACQUIRED HYPOTHYROIDISM: ICD-10-CM

## 2022-06-07 PROBLEM — R19.4 CHANGE IN BOWEL HABIT: Status: ACTIVE | Noted: 2022-06-07

## 2022-06-07 PROBLEM — K22.10 EROSIVE ESOPHAGITIS: Status: ACTIVE | Noted: 2022-06-07

## 2022-06-07 PROBLEM — R07.9 CHEST PAIN: Status: ACTIVE | Noted: 2022-06-07

## 2022-06-07 PROBLEM — K21.9 GASTROESOPHAGEAL REFLUX DISEASE: Status: ACTIVE | Noted: 2022-06-07

## 2022-06-07 PROBLEM — K29.60 EROSIVE GASTRITIS: Status: ACTIVE | Noted: 2022-06-07

## 2022-06-07 PROBLEM — R93.5 ABNORMAL COMPUTERIZED AXIAL TOMOGRAPHY OF ABDOMEN: Status: ACTIVE | Noted: 2022-06-07

## 2022-06-07 PROBLEM — R11.0 NAUSEA: Status: ACTIVE | Noted: 2022-06-07

## 2022-06-07 PROCEDURE — 1090F PRES/ABSN URINE INCON ASSESS: CPT | Performed by: NURSE PRACTITIONER

## 2022-06-07 PROCEDURE — 1101F PT FALLS ASSESS-DOCD LE1/YR: CPT | Performed by: NURSE PRACTITIONER

## 2022-06-07 PROCEDURE — G9717 DOC PT DX DEP/BP F/U NT REQ: HCPCS | Performed by: NURSE PRACTITIONER

## 2022-06-07 PROCEDURE — G8754 DIAS BP LESS 90: HCPCS | Performed by: NURSE PRACTITIONER

## 2022-06-07 PROCEDURE — G8536 NO DOC ELDER MAL SCRN: HCPCS | Performed by: NURSE PRACTITIONER

## 2022-06-07 PROCEDURE — 1123F ACP DISCUSS/DSCN MKR DOCD: CPT | Performed by: NURSE PRACTITIONER

## 2022-06-07 PROCEDURE — G8427 DOCREV CUR MEDS BY ELIG CLIN: HCPCS | Performed by: NURSE PRACTITIONER

## 2022-06-07 PROCEDURE — 3044F HG A1C LEVEL LT 7.0%: CPT | Performed by: NURSE PRACTITIONER

## 2022-06-07 PROCEDURE — 99214 OFFICE O/P EST MOD 30 MIN: CPT | Performed by: NURSE PRACTITIONER

## 2022-06-07 PROCEDURE — G8417 CALC BMI ABV UP PARAM F/U: HCPCS | Performed by: NURSE PRACTITIONER

## 2022-06-07 PROCEDURE — G8753 SYS BP > OR = 140: HCPCS | Performed by: NURSE PRACTITIONER

## 2022-06-07 RX ORDER — METHOCARBAMOL 500 MG/1
500 TABLET, FILM COATED ORAL
Qty: 40 TABLET | Refills: 0 | Status: SHIPPED | OUTPATIENT
Start: 2022-06-07 | End: 2022-06-21 | Stop reason: SDUPTHER

## 2022-06-07 RX ORDER — PARSLEY 450 MG
470 CAPSULE ORAL
COMMUNITY

## 2022-06-07 RX ORDER — OXYBUTYNIN CHLORIDE 15 MG/1
TABLET, EXTENDED RELEASE ORAL
COMMUNITY
Start: 2022-05-16 | End: 2022-06-07 | Stop reason: ALTCHOICE

## 2022-06-07 RX ORDER — METHYLPHENIDATE HYDROCHLORIDE 10 MG/1
TABLET ORAL
COMMUNITY
Start: 2022-05-19

## 2022-06-07 RX ORDER — LANOLIN ALCOHOL/MO/W.PET/CERES
250 CREAM (GRAM) TOPICAL DAILY
COMMUNITY

## 2022-06-07 RX ORDER — SITAGLIPTIN AND METFORMIN HYDROCHLORIDE 500; 50 MG/1; MG/1
TABLET, FILM COATED ORAL
COMMUNITY
End: 2022-06-07 | Stop reason: ALTCHOICE

## 2022-06-07 RX ORDER — CYCLOSPORINE 0.5 MG/ML
EMULSION OPHTHALMIC
COMMUNITY
Start: 2022-05-26

## 2022-06-07 NOTE — PROGRESS NOTES
ROOM # 2  Identified pt with two pt identifiers(name and ). Reviewed record in preparation for visit and have obtained necessary documentation. Chief Complaint   Patient presents with    Diabetes     3 mo f/u    Hypertension     3 mo f/u    Cholesterol Problem     3 mo f/u      Dee Whiting preferred language for health care discussion is English/other. Is the patient using any DME equipment during OV? NO    Dee Whiting is due for:  Health Maintenance Due   Topic    Pneumococcal 65+ years (1 - PCV)    Foot Exam Q1     DTaP/Tdap/Td series (1 - Tdap)    Shingrix Vaccine Age 50> (1 of 2)     Health Maintenance reviewed and discussed per provider  Please order/place referral if appropriate. 1. For patients aged 39-70: Has the patient had a colonoscopy? NA  If the patient is female:    2. For patients aged 41-77: Has the patient had a mammogram within the past 2 years? NA    3. For patients aged 21-65: Has the patient had a pap smear? NA    Advance Directive:  1. Do you have an advance directive in place? Patient Reply: NOT ASKED    2. If not, would you like material regarding how to put one in place? NOT ASKED    Coordination of Care:  1. Have you been to the ER, urgent care clinic since your last visit? Hospitalized since your last visit? NOT ASKED    2. Have you seen or consulted any other health care providers outside of the [address] [place] [address] since your last visit? Include any pap smears or colon screening. CARDIOLOGY, DR. CRAWFORD    Patient is accompanied by HERSELF I have received verbal consent from Dee Whiting to discuss any/all medical information while they are present in the room. Learning Assessment:  No flowsheet data found.   Depression Screening:  3 most recent [facility] Screens 2022 2022 5/10/2022 5/3/2021 10/5/2020   Little interest or pleasure in doing things Not at all Not at all Not at all Nearly every day Several days   Feeling down, depressed, irritable, or hopeless Not at all Not at all Not at all Nearly every day Several days   Total Score PHQ 2 0 0 0 6 2   Trouble falling or staying asleep, or sleeping too much Several days - - - -   Feeling tired or having little energy Several days - - - -   Poor appetite, weight loss, or overeating Not at all - - - -   Feeling bad about yourself - or that you are a failure or have let yourself or your family down Several days - - - -   Trouble concentrating on things such as school, work, reading, or watching TV Several days - - - -   Moving or speaking so slowly that other people could have noticed; or the opposite being so fidgety that others notice Not at all - - - -   Thoughts of being better off dead, or hurting yourself in some way Not at all - - - -   PHQ 9 Score 4 - - - -   How difficult have these problems made it for you to do your work, take care of your home and get along with others Not difficult at all - - - -     Abuse Screening:  Abuse Screening Questionnaire 3/7/2022 5/3/2021   Do you ever feel afraid of your partner? N N   Are you in a relationship with someone who physically or mentally threatens you? N N   Is it safe for you to go home? Y Y     Fall Risk  Fall Risk Assessment, last 12 mths 6/7/2022 5/10/2022 3/7/2022 8/24/2021 5/18/2021 5/3/2021 1/22/2021   Able to walk? Yes Yes Yes No Yes Yes Yes   Fall in past 12 months? 0 - 0 - 0 0 0   Do you feel unsteady? 1 - - - 0 0 0   Are you worried about falling 1 - - - 0 0 0   Number of falls in past 12 months - - - - - - -   Fall with injury? - - - - - - -     Recent Travel Screening and Travel History documentation     Travel Screening     Question   Response    In the last 10 days, have you been in contact with someone who was confirmed or suspected to have Coronavirus/COVID-19? No / Unsure    Have you had a COVID-19 viral test in the last 10 days? No    Do you have any of the following new or worsening symptoms?   None of these    Have you traveled internationally or domestically in the last month?   No      Travel History   Travel since 05/07/22    No documented travel since 05/07/22

## 2022-06-07 NOTE — PROGRESS NOTES
HISTORY OF PRESENT ILLNESS  Abelardo England is a 80 y.o. female. Here for HTN, HLD and DM management   HPI  1) DM - controlled - prescribed metformin 1000 mg  BID but taking 500 mg BID and one day a week adding another metformin   Lab Results   Component Value Date/Time    Hemoglobin A1c 6.7 (H) 03/07/2022 02:44 AM    Hemoglobin A1c 6.3 (H) 04/28/2021 01:24 PM    Hemoglobin A1c 6.3 (H) 10/12/2020 02:56 PM     2) HTN - amlodipine 5 mg, losartan 100 mg, atenolol 50 mg - controlled in cardiology office yesterday   BP Readings from Last 3 Encounters:   06/07/22 (!) 170/80   06/06/22 138/82   05/10/22 (!) 152/72     3) HLD - controlled - Zocor 10 mg and omega 3   Lab Results   Component Value Date/Time    Cholesterol, total 113 03/07/2022 02:44 AM    HDL Cholesterol 47 03/07/2022 02:44 AM    LDL, calculated 45 03/07/2022 02:44 AM    LDL, calculated 44.6 10/12/2020 02:56 PM    VLDL, calculated 21 03/07/2022 02:44 AM    VLDL, calculated 31.4 10/12/2020 02:56 PM    Triglyceride 116 03/07/2022 02:44 AM    CHOL/HDL Ratio 2.2 10/12/2020 02:56 PM     4) Hypothyroidism - controlled - Synthroid 50 mcg   Lab Results   Component Value Date/Time    TSH 3.210 03/07/2022 02:44 AM      5) Osteoporosis - Fosamax weekly        6) Bilateral shoulder pain - began in may - Pain level 9/10 - described as an ache soreness -   worsened with laying on the shoulder - relieved with icey hot for a short period    BP (!) 170/80 (BP 1 Location: Left arm, BP Patient Position: Sitting)   Pulse 67   Temp 96.8 °F (36 °C) (Temporal)   Resp 20   Ht 5' 1\" (1.549 m)   Wt 159 lb (72.1 kg)   SpO2 98%   BMI 30.04 kg/m²   Current Outpatient Medications   Medication Sig Dispense Refill    Restasis 0.05 % dpet       methylphenidate HCl (RITALIN) 10 mg tablet       magnesium oxide (MAG-OX) 400 mg tablet Take 250 mg by mouth daily.  ashwagandha root extract 500 mg cap Take 470 mg by mouth.       methocarbamoL (ROBAXIN) 500 mg tablet Take 1 Tablet by mouth two (2) times daily as needed for Muscle Spasm(s) for up to 20 days. 40 Tablet 0    COQ10, UBIQUINOL, PO Take  by mouth.  amLODIPine (NORVASC) 5 mg tablet Take 1 Tablet by mouth daily. 90 Tablet 3    levothyroxine (SYNTHROID) 50 mcg tablet Take 1 Tablet by mouth Daily (before breakfast). 90 Tablet 1    alendronate (FOSAMAX) 70 mg tablet Take 1 Tablet by mouth every seven (7) days. 12 Tablet 3    atenoloL (TENORMIN) 50 mg tablet Take 1 Tablet by mouth daily. 90 Tablet 2    losartan (COZAAR) 100 mg tablet Take 1 Tablet by mouth daily. 90 Tablet 3    simvastatin (ZOCOR) 10 mg tablet Take 1 Tablet by mouth nightly. 90 Tablet 3    True Metrix Glucose Test Strip strip Check BS  Strip 5    alcohol swabs (BD Single Use Swabs Regular) padm To be used to check BS  Pad 5    Blood Glucose Control, Low (True Metrix Level 1) soln To be used to check controls 1 Each 4    valACYclovir (VALTREX) 1 gram tablet Take 0.5 Tablets by mouth daily. Take 500 mg by mouth. 90 Tablet 0    ZINC OXIDE-COD LIVER OIL EX Apply  to affected area as needed for Skin Irritation.  pantoprazole (PROTONIX) 40 mg tablet Take 1 Tablet by mouth daily. 90 Tablet 1    Blood-Glucose Meter (True Metrix Air Glucose Meter) monitoring kit Use once daily 1 Kit 0    ascorbic acid, vitamin C, (VITAMIN C) 500 mg tablet Take 500 mg by mouth daily.  Osphena 60 mg tab tablet TAKE ONE TABLET BY MOUTH EVERY DAY WITH FOOD      calcium-vitamin D 600 mg(1,500mg) -200 unit tab Take 2 Tabs by mouth two (2) times a day.  TRUEplus Lancets 28 gauge misc       OLANZapine (ZyPREXA) 5 mg tablet       folic acid (FOLVITE) 512 mcg tablet Take 0.8 mg by mouth daily.  clonazePAM (KlonoPIN) 0.5 mg tablet       acetaminophen (TYLENOL EXTRA STRENGTH) 500 mg tablet Take 2 Tabs by mouth every six (6) hours as needed for Pain. 40 Tab 0    omega-3 fatty acids-vitamin e 1,000 mg cap Take 1 Cap by mouth.       multivitamin (ONE A DAY) tablet Take 1 Tab by mouth daily.  venlafaxine-SR (EFFEXOR-XR) 150 mg capsule Take  by mouth two (2) times a day.  aspirin 81 mg chewable tablet Take 81 mg by mouth daily.  metFORMIN (GLUCOPHAGE) 500 mg tablet TAKE 1 TABLET TWICE DAILY WITH MEALS 180 Tablet 0    oxybutynin chloride XL (DITROPAN XL) 10 mg CR tablet  (Patient not taking: Reported on 6/7/2022)         Review of Systems   Constitutional: Positive for malaise/fatigue. Negative for chills and fever. Eyes: Negative for blurred vision and double vision. Respiratory: Negative for cough, shortness of breath and wheezing. Cardiovascular: Negative for chest pain and palpitations. Neurological: Negative for dizziness and headaches. Physical Exam  Vitals and nursing note reviewed. Constitutional:       General: She is not in acute distress. Appearance: She is obese. She is not ill-appearing. HENT:      Head: Normocephalic. Right Ear: External ear normal.      Left Ear: External ear normal.      Mouth/Throat:      Comments: MASK  Eyes:      General: No scleral icterus. Extraocular Movements: Extraocular movements intact. Conjunctiva/sclera: Conjunctivae normal.      Pupils: Pupils are equal, round, and reactive to light. Cardiovascular:      Rate and Rhythm: Normal rate and regular rhythm. Pulses: Normal pulses. Heart sounds: Normal heart sounds. Pulmonary:      Effort: Pulmonary effort is normal. No respiratory distress. Breath sounds: Normal breath sounds. No wheezing. Musculoskeletal:         General: Normal range of motion. Cervical back: Normal range of motion. Right lower leg: No edema. Left lower leg: No edema. Lymphadenopathy:      Cervical: No cervical adenopathy. Skin:     General: Skin is warm and dry. Findings: No lesion or rash. Neurological:      General: No focal deficit present.       Mental Status: She is alert and oriented to person, place, and time. Psychiatric:         Mood and Affect: Mood normal.         Behavior: Behavior normal.         Thought Content: Thought content normal.         Judgment: Judgment normal.           Diabetic foot exam:     Left Foot:   Visual Exam: normal    Pulse DP: 2+ (normal)   Filament test: reduced sensation    Vibratory sensation: diminished      Right Foot:   Visual Exam: normal    Pulse DP: 2+ (normal)   Filament test: reduced sensation    Vibratory sensation: diminished      ASSESSMENT and PLAN  Diagnoses and all orders for this visit:    1. Type 2 diabetes mellitus with diabetic polyneuropathy, without long-term current use of insulin (HCC)  -     HEMOGLOBIN A1C WITH EAG; Future  -      DIABETES FOOT EXAM    2. Hypercholesterolemia    3. Essential hypertension    4. Acquired hypothyroidism    5. Bilateral shoulder pain, unspecified chronicity  -     methocarbamoL (ROBAXIN) 500 mg tablet; Take 1 Tablet by mouth two (2) times daily as needed for Muscle Spasm(s) for up to 20 days. DM - controlled - update A1C continue metformin 500 mg BID  HLD - controlled - continue Zocor and omega 3   HTN - continue amlodipine, atenolol and losartan at current dose and keep F/U with cardiology  Hypothyroid - continue synthroid at current dose   Bilateral shoulder pain - musculoskeletal in nature - robaxin BID - advised not to drive while taking medication     Follow-up and Dispositions    · Return in about 3 months (around 9/7/2022) for DM, HTN, CHOL.

## 2022-06-08 LAB
EST. AVERAGE GLUCOSE BLD GHB EST-MCNC: 183 MG/DL
HBA1C MFR BLD: 8 % (ref 4.8–5.6)

## 2022-06-08 NOTE — PROGRESS NOTES
Results reviewed. Please call patient with results. A1C is 8.0 not controlled - recommend metformin 1000 mg BID she is taking 500 mg BID recommended increasing by 500 mg weekly until she reaches 1000 mg BID.

## 2022-06-10 NOTE — PROGRESS NOTES
Called pt and unable to leave message, mailbox is full. The call was to inform pt of their lab results. Letter sent.

## 2022-06-21 ENCOUNTER — TELEPHONE (OUTPATIENT)
Dept: INTERNAL MEDICINE CLINIC | Age: 82
End: 2022-06-21

## 2022-06-21 DIAGNOSIS — M25.511 BILATERAL SHOULDER PAIN, UNSPECIFIED CHRONICITY: ICD-10-CM

## 2022-06-21 DIAGNOSIS — E11.42 TYPE 2 DIABETES MELLITUS WITH DIABETIC POLYNEUROPATHY, WITHOUT LONG-TERM CURRENT USE OF INSULIN (HCC): Primary | ICD-10-CM

## 2022-06-21 DIAGNOSIS — M25.512 BILATERAL SHOULDER PAIN, UNSPECIFIED CHRONICITY: ICD-10-CM

## 2022-06-21 RX ORDER — METHOCARBAMOL 500 MG/1
500 TABLET, FILM COATED ORAL
Qty: 40 TABLET | Refills: 0 | Status: SHIPPED | OUTPATIENT
Start: 2022-06-21 | End: 2022-07-11

## 2022-06-21 RX ORDER — METFORMIN HYDROCHLORIDE 500 MG/1
TABLET ORAL
Qty: 180 TABLET | Refills: 0 | Status: SHIPPED | OUTPATIENT
Start: 2022-06-21 | End: 2022-08-03

## 2022-06-21 NOTE — TELEPHONE ENCOUNTER
Spoke with pt in regards to lab results. Two patient identifier's verified. Relayed the PCP's note. Pt acknowledges understanding and  states she is aware of how to increase her metformin. Pt states she was previously instructed to take metformin 500 mg two times a daily 500 mg, then 1 extra 500 mg once a week started on 06/07/2022. Pt requests a refill for metformin and methocarbamol, pt states she has to use the muscle relaxer more often fopr the shoulder pain.

## 2022-07-05 ENCOUNTER — OFFICE VISIT (OUTPATIENT)
Dept: INTERNAL MEDICINE CLINIC | Age: 82
End: 2022-07-05
Payer: MEDICARE

## 2022-07-05 ENCOUNTER — HOSPITAL ENCOUNTER (OUTPATIENT)
Dept: LAB | Age: 82
Discharge: HOME | End: 2022-07-05
Payer: MEDICARE

## 2022-07-05 VITALS
OXYGEN SATURATION: 98 % | WEIGHT: 158.4 LBS | BODY MASS INDEX: 29.91 KG/M2 | HEIGHT: 61 IN | SYSTOLIC BLOOD PRESSURE: 138 MMHG | DIASTOLIC BLOOD PRESSURE: 77 MMHG | RESPIRATION RATE: 20 BRPM | HEART RATE: 78 BPM | TEMPERATURE: 97.5 F

## 2022-07-05 DIAGNOSIS — M25.50 MULTIPLE JOINT PAIN: Primary | ICD-10-CM

## 2022-07-05 DIAGNOSIS — M48.061 FORAMINAL STENOSIS OF LUMBAR REGION: ICD-10-CM

## 2022-07-05 DIAGNOSIS — M48.00 CENTRAL STENOSIS OF SPINAL CANAL: ICD-10-CM

## 2022-07-05 DIAGNOSIS — M25.50 MULTIPLE JOINT PAIN: ICD-10-CM

## 2022-07-05 DIAGNOSIS — M25.551 RIGHT HIP PAIN: ICD-10-CM

## 2022-07-05 LAB
ERYTHROCYTE [SEDIMENTATION RATE] IN BLOOD: 7 MM/HR (ref 0–30)
RHEUMATOID FACT SERPL-ACNC: <10 IU/ML

## 2022-07-05 PROCEDURE — 86431 RHEUMATOID FACTOR QUANT: CPT

## 2022-07-05 PROCEDURE — 1123F ACP DISCUSS/DSCN MKR DOCD: CPT | Performed by: NURSE PRACTITIONER

## 2022-07-05 PROCEDURE — G8427 DOCREV CUR MEDS BY ELIG CLIN: HCPCS | Performed by: NURSE PRACTITIONER

## 2022-07-05 PROCEDURE — 86200 CCP ANTIBODY: CPT

## 2022-07-05 PROCEDURE — 36415 COLL VENOUS BLD VENIPUNCTURE: CPT

## 2022-07-05 PROCEDURE — 99214 OFFICE O/P EST MOD 30 MIN: CPT | Performed by: NURSE PRACTITIONER

## 2022-07-05 PROCEDURE — G8752 SYS BP LESS 140: HCPCS | Performed by: NURSE PRACTITIONER

## 2022-07-05 PROCEDURE — 85652 RBC SED RATE AUTOMATED: CPT

## 2022-07-05 PROCEDURE — G8536 NO DOC ELDER MAL SCRN: HCPCS | Performed by: NURSE PRACTITIONER

## 2022-07-05 PROCEDURE — 1090F PRES/ABSN URINE INCON ASSESS: CPT | Performed by: NURSE PRACTITIONER

## 2022-07-05 PROCEDURE — 1101F PT FALLS ASSESS-DOCD LE1/YR: CPT | Performed by: NURSE PRACTITIONER

## 2022-07-05 PROCEDURE — G8754 DIAS BP LESS 90: HCPCS | Performed by: NURSE PRACTITIONER

## 2022-07-05 PROCEDURE — G9717 DOC PT DX DEP/BP F/U NT REQ: HCPCS | Performed by: NURSE PRACTITIONER

## 2022-07-05 PROCEDURE — G8417 CALC BMI ABV UP PARAM F/U: HCPCS | Performed by: NURSE PRACTITIONER

## 2022-07-05 PROCEDURE — 86038 ANTINUCLEAR ANTIBODIES: CPT

## 2022-07-05 NOTE — PROGRESS NOTES
HISTORY OF PRESENT ILLNESS  Behzad Summers is a 80 y.o. female. Here for Chronic pain   HPI   1) Here for Pain -Chronic- Bilateral shoulders and lower back and  RT hip pain. Denies No injury/surgery. Also reports LT knee pain x 2 weeks. Worsened with sleeping on the sides, walking a lot relieved with robaxin some and roll on icy hot. Pain level 3/10 aching pain -   Reports urinary leakage and a couple incidents of having BM on herself this week. 2) Has been on fosamax for a couple years     /77 (BP 1 Location: Left arm, BP Patient Position: Sitting)   Pulse 78   Temp 97.5 °F (36.4 °C) (Temporal)   Resp 20   Ht 5' 1\" (1.549 m)   Wt 158 lb 6.4 oz (71.8 kg)   SpO2 98%   BMI 29.93 kg/m²   Current Outpatient Medications   Medication Sig Dispense Refill    methocarbamoL (ROBAXIN) 500 mg tablet Take 1 Tablet by mouth two (2) times daily as needed for Muscle Spasm(s) for up to 20 days. 40 Tablet 0    metFORMIN (GLUCOPHAGE) 500 mg tablet Take 1 tab BID and increase by 1 tab each week until you reach 1000 mg  Tablet 0    Restasis 0.05 % dpet       methylphenidate HCl (RITALIN) 10 mg tablet       magnesium oxide (MAG-OX) 400 mg tablet Take 250 mg by mouth daily.  ashwagandha root extract 500 mg cap Take 470 mg by mouth.  COQ10, UBIQUINOL, PO Take  by mouth.  amLODIPine (NORVASC) 5 mg tablet Take 1 Tablet by mouth daily. 90 Tablet 3    levothyroxine (SYNTHROID) 50 mcg tablet Take 1 Tablet by mouth Daily (before breakfast). 90 Tablet 1    alendronate (FOSAMAX) 70 mg tablet Take 1 Tablet by mouth every seven (7) days. 12 Tablet 3    atenoloL (TENORMIN) 50 mg tablet Take 1 Tablet by mouth daily. 90 Tablet 2    losartan (COZAAR) 100 mg tablet Take 1 Tablet by mouth daily. 90 Tablet 3    simvastatin (ZOCOR) 10 mg tablet Take 1 Tablet by mouth nightly.  90 Tablet 3    True Metrix Glucose Test Strip strip Check BS  Strip 5    alcohol swabs (BD Single Use Swabs Regular) padm To be used to check BS  Pad 5    Blood Glucose Control, Low (True Metrix Level 1) soln To be used to check controls 1 Each 4    valACYclovir (VALTREX) 1 gram tablet Take 0.5 Tablets by mouth daily. Take 500 mg by mouth. 90 Tablet 0    ZINC OXIDE-COD LIVER OIL EX Apply  to affected area as needed for Skin Irritation.  pantoprazole (PROTONIX) 40 mg tablet Take 1 Tablet by mouth daily. 90 Tablet 1    Blood-Glucose Meter (True Metrix Air Glucose Meter) monitoring kit Use once daily 1 Kit 0    ascorbic acid, vitamin C, (VITAMIN C) 500 mg tablet Take 500 mg by mouth daily.  oxybutynin chloride XL (DITROPAN XL) 10 mg CR tablet       Osphena 60 mg tab tablet TAKE ONE TABLET BY MOUTH EVERY DAY WITH FOOD      calcium-vitamin D 600 mg(1,500mg) -200 unit tab Take 2 Tabs by mouth two (2) times a day.  TRUEplus Lancets 28 gauge misc       OLANZapine (ZyPREXA) 5 mg tablet       folic acid (FOLVITE) 234 mcg tablet Take 0.8 mg by mouth daily.  clonazePAM (KlonoPIN) 0.5 mg tablet       acetaminophen (TYLENOL EXTRA STRENGTH) 500 mg tablet Take 2 Tabs by mouth every six (6) hours as needed for Pain. 40 Tab 0    omega-3 fatty acids-vitamin e 1,000 mg cap Take 1 Cap by mouth.  multivitamin (ONE A DAY) tablet Take 1 Tab by mouth daily.  venlafaxine-SR (EFFEXOR-XR) 150 mg capsule Take  by mouth two (2) times a day.  aspirin 81 mg chewable tablet Take 81 mg by mouth daily. Review of Systems   Constitutional: Positive for malaise/fatigue. Negative for chills and fever. Eyes: Negative for blurred vision and double vision. Respiratory: Negative for cough, shortness of breath and wheezing. Cardiovascular: Negative for chest pain, palpitations and leg swelling. Neurological: Negative for dizziness and headaches. Physical Exam  Vitals and nursing note reviewed. Constitutional:       General: She is not in acute distress. Appearance: Normal appearance.  She is not ill-appearing. HENT:      Head: Normocephalic. Right Ear: External ear normal.      Left Ear: External ear normal.      Nose: Nose normal.      Mouth/Throat:      Mouth: Mucous membranes are moist.      Pharynx: Oropharynx is clear. Eyes:      General: No scleral icterus. Extraocular Movements: Extraocular movements intact. Conjunctiva/sclera: Conjunctivae normal.      Pupils: Pupils are equal, round, and reactive to light. Neck:      Vascular: No carotid bruit. Cardiovascular:      Rate and Rhythm: Normal rate and regular rhythm. Pulses: Normal pulses. Heart sounds: Normal heart sounds. Pulmonary:      Effort: Pulmonary effort is normal. No respiratory distress. Breath sounds: Normal breath sounds. No wheezing. Abdominal:      General: Abdomen is flat. Tenderness: There is no guarding or rebound. Musculoskeletal:      Right shoulder: No swelling, tenderness, bony tenderness or crepitus. Decreased range of motion. Left shoulder: No swelling, tenderness, bony tenderness or crepitus. Normal range of motion. Right hip: No deformity, lacerations, tenderness or bony tenderness. Right lower leg: No edema. Left lower leg: No edema. Lymphadenopathy:      Cervical: No cervical adenopathy. Skin:     General: Skin is warm and dry. Findings: No lesion or rash. Neurological:      General: No focal deficit present. Mental Status: She is alert and oriented to person, place, and time. Psychiatric:         Mood and Affect: Mood normal.         Behavior: Behavior normal.         Thought Content: Thought content normal.         Judgment: Judgment normal.         ASSESSMENT and PLAN  Diagnoses and all orders for this visit:    1. Multiple joint pain  -     SED RATE (ESR); Future  -     CYCLIC CITRUL PEPTIDE AB, IGG; Future  -     ANTINUCLEAR ANTIBODIES, IFA; Future  -     RHEUMATOID FACTOR, QT; Future    2.  Foraminal stenosis of lumbar region  -     REFERRAL TO SPINE SURGERY    3. Central stenosis of spinal canal  -     REFERRAL TO SPINE SURGERY    4. Right hip pain  -     XR HIP RT W OR WO PELV 2-3 VWS; Future    labs for inflammatory markers   Referral to spine for evaluation and treatment patient has had a couple boughts of defecating on herself in her sleep and noted spinal stenosis on MRI   Right hip pain - xray for evaluation   Prn NSAIDS or tylenol for pain   Follow-up and Dispositions    · Return if symptoms worsen or fail to improve.

## 2022-07-08 LAB — CCP IGA+IGG SERPL IA-ACNC: 7 UNITS (ref 0–19)

## 2022-07-15 LAB — ANA TITR SER IF: NEGATIVE {TITER}

## 2022-07-27 DIAGNOSIS — E11.42 TYPE 2 DIABETES MELLITUS WITH DIABETIC POLYNEUROPATHY, WITHOUT LONG-TERM CURRENT USE OF INSULIN (HCC): ICD-10-CM

## 2022-07-27 NOTE — TELEPHONE ENCOUNTER
Pharmacy fax request for new order.     Requested Prescriptions     Pending Prescriptions Disp Refills    TRUEplus Lancets 28 gauge misc      True Metrix Glucose Test Strip strip 100 Strip 5     Sig: Check BS BID    Blood-Glucose Meter (True Metrix Air Glucose Meter) monitoring kit 1 Kit 0     Sig: Use once daily    Blood Glucose Control, Low (True Metrix Level 1) soln 1 Each 4     Sig: To be used to check controls

## 2022-07-28 RX ORDER — INSULIN PUMP SYRINGE, 3 ML
EACH MISCELLANEOUS
Qty: 1 KIT | Refills: 0 | Status: SHIPPED | OUTPATIENT
Start: 2022-07-28

## 2022-07-28 RX ORDER — GLUCOSAM/CHON-MSM1/C/MANG/BOSW 500-416.6
TABLET ORAL
Qty: 100 LANCET | Refills: 3 | Status: SHIPPED | OUTPATIENT
Start: 2022-07-28 | End: 2022-08-02 | Stop reason: SDUPTHER

## 2022-07-28 RX ORDER — CALCIUM CITRATE/VITAMIN D3 200MG-6.25
TABLET ORAL
Qty: 100 STRIP | Refills: 5 | Status: SHIPPED | OUTPATIENT
Start: 2022-07-28 | End: 2022-08-02 | Stop reason: SDUPTHER

## 2022-07-28 RX ORDER — BLOOD-GLUCOSE METER
EACH MISCELLANEOUS
Qty: 1 EACH | Refills: 4 | Status: SHIPPED | OUTPATIENT
Start: 2022-07-28

## 2022-08-02 DIAGNOSIS — E11.42 TYPE 2 DIABETES MELLITUS WITH DIABETIC POLYNEUROPATHY, WITHOUT LONG-TERM CURRENT USE OF INSULIN (HCC): ICD-10-CM

## 2022-08-02 RX ORDER — CALCIUM CITRATE/VITAMIN D3 200MG-6.25
TABLET ORAL
Qty: 100 STRIP | Refills: 5 | Status: SHIPPED | OUTPATIENT
Start: 2022-08-02

## 2022-08-02 RX ORDER — GLUCOSAM/CHON-MSM1/C/MANG/BOSW 500-416.6
TABLET ORAL
Qty: 100 LANCET | Refills: 3 | Status: SHIPPED | OUTPATIENT
Start: 2022-08-02

## 2022-08-16 ENCOUNTER — DOCUMENTATION ONLY (OUTPATIENT)
Dept: INTERNAL MEDICINE CLINIC | Age: 82
End: 2022-08-16

## 2022-09-07 ENCOUNTER — OFFICE VISIT (OUTPATIENT)
Dept: INTERNAL MEDICINE CLINIC | Age: 82
End: 2022-09-07
Payer: MEDICARE

## 2022-09-07 VITALS
BODY MASS INDEX: 29.11 KG/M2 | SYSTOLIC BLOOD PRESSURE: 131 MMHG | WEIGHT: 154.2 LBS | HEART RATE: 75 BPM | OXYGEN SATURATION: 98 % | DIASTOLIC BLOOD PRESSURE: 75 MMHG | HEIGHT: 61 IN | TEMPERATURE: 97.6 F

## 2022-09-07 DIAGNOSIS — E11.42 TYPE 2 DIABETES MELLITUS WITH DIABETIC POLYNEUROPATHY, WITHOUT LONG-TERM CURRENT USE OF INSULIN (HCC): Primary | ICD-10-CM

## 2022-09-07 DIAGNOSIS — E03.9 ACQUIRED HYPOTHYROIDISM: ICD-10-CM

## 2022-09-07 DIAGNOSIS — I10 ESSENTIAL HYPERTENSION: ICD-10-CM

## 2022-09-07 DIAGNOSIS — E78.00 HYPERCHOLESTEROLEMIA: ICD-10-CM

## 2022-09-07 PROCEDURE — G8427 DOCREV CUR MEDS BY ELIG CLIN: HCPCS | Performed by: NURSE PRACTITIONER

## 2022-09-07 PROCEDURE — 1123F ACP DISCUSS/DSCN MKR DOCD: CPT | Performed by: NURSE PRACTITIONER

## 2022-09-07 PROCEDURE — 99214 OFFICE O/P EST MOD 30 MIN: CPT | Performed by: NURSE PRACTITIONER

## 2022-09-07 PROCEDURE — G8417 CALC BMI ABV UP PARAM F/U: HCPCS | Performed by: NURSE PRACTITIONER

## 2022-09-07 PROCEDURE — G9717 DOC PT DX DEP/BP F/U NT REQ: HCPCS | Performed by: NURSE PRACTITIONER

## 2022-09-07 PROCEDURE — G8752 SYS BP LESS 140: HCPCS | Performed by: NURSE PRACTITIONER

## 2022-09-07 PROCEDURE — 1101F PT FALLS ASSESS-DOCD LE1/YR: CPT | Performed by: NURSE PRACTITIONER

## 2022-09-07 PROCEDURE — 3052F HG A1C>EQUAL 8.0%<EQUAL 9.0%: CPT | Performed by: NURSE PRACTITIONER

## 2022-09-07 PROCEDURE — G8754 DIAS BP LESS 90: HCPCS | Performed by: NURSE PRACTITIONER

## 2022-09-07 PROCEDURE — G8536 NO DOC ELDER MAL SCRN: HCPCS | Performed by: NURSE PRACTITIONER

## 2022-09-07 PROCEDURE — 1090F PRES/ABSN URINE INCON ASSESS: CPT | Performed by: NURSE PRACTITIONER

## 2022-09-07 RX ORDER — SERTRALINE HYDROCHLORIDE 50 MG/1
TABLET, FILM COATED ORAL
COMMUNITY
Start: 2022-08-31

## 2022-09-07 NOTE — PROGRESS NOTES
HISTORY OF PRESENT ILLNESS  Omaira Pelaez is a 80 y.o. female. Here for HTN, HLD and DM management   HPI  1) DM - metformin 1000 mg BID  Lab Results   Component Value Date/Time    Hemoglobin A1c 8.0 (H) 06/07/2022 02:29 AM    Hemoglobin A1c 6.7 (H) 03/07/2022 02:44 AM    Hemoglobin A1c 6.3 (H) 04/28/2021 01:24 PM      2) HTN - controlled - amlodipine 5 mg, losartan 100 mg, atenolol 50 mg - cardiology appointment awhile ago   BP Readings from Last 3 Encounters:   09/07/22 131/75   07/05/22 138/77   06/07/22 (!) 157/80      3) HLD - controlled - Zocor 10 mg and omega 3   Lab Results   Component Value Date/Time    Cholesterol, total 113 03/07/2022 02:44 AM    HDL Cholesterol 47 03/07/2022 02:44 AM    LDL, calculated 45 03/07/2022 02:44 AM    LDL, calculated 44.6 10/12/2020 02:56 PM    VLDL, calculated 21 03/07/2022 02:44 AM    VLDL, calculated 31.4 10/12/2020 02:56 PM    Triglyceride 116 03/07/2022 02:44 AM    CHOL/HDL Ratio 2.2 10/12/2020 02:56 PM      4) Hypothyroidism - controlled - Synthroid 50 mcg   Lab Results   Component Value Date/Time    TSH 3.210 03/07/2022 02:44 AM       5) Osteoporosis - Fosamax weekly started between 0933-1287 - will need to decide on holiday next year     /75 (BP 1 Location: Left upper arm, BP Patient Position: Sitting, BP Cuff Size: Small adult)   Pulse 75   Temp 97.6 °F (36.4 °C)   Ht 5' 1\" (1.549 m)   Wt 154 lb 3.2 oz (69.9 kg)   SpO2 98%   BMI 29.14 kg/m²   Current Outpatient Medications   Medication Sig Dispense Refill    metFORMIN (GLUCOPHAGE) 500 mg tablet TAKE 1 TABLET TWICE DAILY AND INCREASE BY 1 TABLET EACH WEEK UNTIL REACH 2 TABLETS (1000MG) TWICE DAILY 180 Tablet 0    Blood Glucose Control, Low (True Metrix Level 1) soln To be used to check controls 1 Each 4    Restasis 0.05 % dpet       methylphenidate HCl (RITALIN) 10 mg tablet       magnesium oxide (MAG-OX) 400 mg tablet Take 250 mg by mouth daily.       ashwagandha root extract 500 mg cap Take 470 mg by mouth.      COQ10, UBIQUINOL, PO Take  by mouth. amLODIPine (NORVASC) 5 mg tablet Take 1 Tablet by mouth daily. 90 Tablet 3    levothyroxine (SYNTHROID) 50 mcg tablet Take 1 Tablet by mouth Daily (before breakfast). 90 Tablet 1    alendronate (FOSAMAX) 70 mg tablet Take 1 Tablet by mouth every seven (7) days. 12 Tablet 3    atenoloL (TENORMIN) 50 mg tablet Take 1 Tablet by mouth daily. 90 Tablet 2    losartan (COZAAR) 100 mg tablet Take 1 Tablet by mouth daily. 90 Tablet 3    simvastatin (ZOCOR) 10 mg tablet Take 1 Tablet by mouth nightly. 90 Tablet 3    valACYclovir (VALTREX) 1 gram tablet Take 0.5 Tablets by mouth daily. Take 500 mg by mouth. 90 Tablet 0    ZINC OXIDE-COD LIVER OIL EX Apply  to affected area as needed for Skin Irritation. pantoprazole (PROTONIX) 40 mg tablet Take 1 Tablet by mouth daily. 90 Tablet 1    ascorbic acid, vitamin C, (VITAMIN C) 500 mg tablet Take 500 mg by mouth daily. oxybutynin chloride XL (DITROPAN XL) 10 mg CR tablet       Osphena 60 mg tab tablet TAKE ONE TABLET BY MOUTH EVERY DAY WITH FOOD      calcium-vitamin D 600 mg(1,500mg) -200 unit tab Take 2 Tabs by mouth two (2) times a day. OLANZapine (ZyPREXA) 5 mg tablet       folic acid (FOLVITE) 403 mcg tablet Take 0.8 mg by mouth daily. clonazePAM (KlonoPIN) 0.5 mg tablet       acetaminophen (TYLENOL EXTRA STRENGTH) 500 mg tablet Take 2 Tabs by mouth every six (6) hours as needed for Pain. 40 Tab 0    omega-3 fatty acids-vitamin e 1,000 mg cap Take 1 Cap by mouth.      multivitamin (ONE A DAY) tablet Take 1 Tab by mouth daily. venlafaxine-SR (EFFEXOR-XR) 150 mg capsule Take  by mouth two (2) times a day. aspirin 81 mg chewable tablet Take 81 mg by mouth daily.         sertraline (ZOLOFT) 50 mg tablet       True Metrix Glucose Test Strip strip Check BS once daily 100 Strip 5    TRUEplus Lancets 28 gauge misc To be used to check BS once daily 100 Lancet 3    Blood-Glucose Meter (True Metrix Air Glucose Meter) monitoring kit Use once daily 1 Kit 0    alcohol swabs (BD Single Use Swabs Regular) padm To be used to check BS  Pad 5       Review of Systems   Constitutional:  Positive for malaise/fatigue. Negative for chills and fever. Eyes:  Negative for blurred vision and double vision. Respiratory:  Negative for cough, shortness of breath and wheezing. Cardiovascular:  Negative for chest pain, palpitations and leg swelling. Neurological:  Negative for dizziness and headaches. Physical Exam  Vitals and nursing note reviewed. Constitutional:       General: She is not in acute distress. Appearance: Normal appearance. She is not ill-appearing. HENT:      Head: Normocephalic. Right Ear: External ear normal.      Left Ear: External ear normal.      Mouth/Throat:      Comments: MASK  Eyes:      General: No scleral icterus. Conjunctiva/sclera: Conjunctivae normal.   Cardiovascular:      Rate and Rhythm: Normal rate and regular rhythm. Pulses: Normal pulses. Heart sounds: Normal heart sounds. Pulmonary:      Effort: Pulmonary effort is normal. No respiratory distress. Breath sounds: Normal breath sounds. No wheezing. Musculoskeletal:         General: Normal range of motion. Cervical back: Normal range of motion. Right lower leg: No edema. Left lower leg: No edema. Lymphadenopathy:      Cervical: No cervical adenopathy. Skin:     General: Skin is warm and dry. Findings: Bruising (left knee) present. No lesion or rash. Neurological:      General: No focal deficit present. Mental Status: She is alert and oriented to person, place, and time. Mental status is at baseline. Psychiatric:         Mood and Affect: Mood normal.         Behavior: Behavior normal.         Thought Content: Thought content normal.         Judgment: Judgment normal.       ASSESSMENT and PLAN  Diagnoses and all orders for this visit:    1.  Type 2 diabetes mellitus with diabetic polyneuropathy, without long-term current use of insulin (HCC)  -     HEMOGLOBIN A1C WITH EAG; Future    2. Essential hypertension  -     METABOLIC PANEL, COMPREHENSIVE; Future    3. Hypercholesterolemia  -     METABOLIC PANEL, COMPREHENSIVE; Future    4. Acquired hypothyroidism  DM - controlled - update A1C continue metformin 1000 mg BID  HLD - controlled - continue Zocor and omega 3   HTN - continue amlodipine, atenolol and losartan at current dose and keep F/U with cardiology  Hypothyroid - continue synthroid at current dose   Follow-up and Dispositions    Return in about 3 months (around 12/7/2022) for HTN, CHOL, DM.

## 2022-09-07 NOTE — PROGRESS NOTES
Lynn Ji is a 80 y.o. female (: 1940) presenting to address:    Chief Complaint   Patient presents with    Diabetes    Cholesterol Problem    Hypertension       Vitals:    22 1348   Temp: 97.6 °F (36.4 °C)   Weight: 154 lb 3.2 oz (69.9 kg)   Height: 5' 1\" (1.549 m)   PainSc:   0 - No pain       Hearing/Vision:   No results found. Learning Assessment:   No flowsheet data found. Depression Screening:     3 most recent PHQ Screens 2022   Little interest or pleasure in doing things Several days   Feeling down, depressed, irritable, or hopeless Several days   Total Score PHQ 2 2   Trouble falling or staying asleep, or sleeping too much Not at all   Feeling tired or having little energy Several days   Poor appetite, weight loss, or overeating Several days   Feeling bad about yourself - or that you are a failure or have let yourself or your family down Several days   Trouble concentrating on things such as school, work, reading, or watching TV Not at all   Moving or speaking so slowly that other people could have noticed; or the opposite being so fidgety that others notice Not at all   Thoughts of being better off dead, or hurting yourself in some way Not at all   PHQ 9 Score 5   How difficult have these problems made it for you to do your work, take care of your home and get along with others Not difficult at all     Fall Risk Assessment:     Fall Risk Assessment, last 12 mths 2022   Able to walk? Yes   Fall in past 12 months? 1   Do you feel unsteady? 1   Are you worried about falling 1   Number of falls in past 12 months 1   Fall with injury? 0     Abuse Screening:     Abuse Screening Questionnaire 3/7/2022   Do you ever feel afraid of your partner? N   Are you in a relationship with someone who physically or mentally threatens you? N   Is it safe for you to go home? Y     ADL Assessment:   No flowsheet data found. Coordination of Care Questionaire:   1.  \"Have you been to the ER, urgent care clinic since your last visit? Hospitalized since your last visit? \" No    2. \"Have you seen or consulted any other health care providers outside of the [address] [place] [address] since your last visit? \" No     3. For patients aged 39-70: Has the patient had a colonoscopy / FIT/ Cologuard? NA - based on age      If the patient is female:    4. For patients aged 41-77: Has the patient had a mammogram within the past 2 years? NA - based on age or sex  See top three    5. For patients aged 21-65: Has the patient had a pap smear? NA - based on age or sex    Advanced Directive:   1. Do you have an Advanced Directive? YES    2. Would you like information on Advanced Directives?  NO

## 2022-09-08 DIAGNOSIS — E87.1 HYPONATREMIA: Primary | ICD-10-CM

## 2022-09-08 LAB
ALBUMIN SERPL-MCNC: 4.7 G/DL (ref 3.7–4.7)
ALBUMIN/GLOB SERPL: 2.1 {RATIO} (ref 1.2–2.2)
ALP SERPL-CCNC: 82 IU/L (ref 44–121)
ALT SERPL-CCNC: 13 IU/L (ref 0–32)
AST SERPL-CCNC: 20 IU/L (ref 0–40)
BILIRUB SERPL-MCNC: 0.2 MG/DL (ref 0–1.2)
BUN SERPL-MCNC: 8 MG/DL (ref 8–27)
BUN/CREAT SERPL: 10 (ref 12–28)
CALCIUM SERPL-MCNC: 9.7 MG/DL (ref 8.7–10.3)
CHLORIDE SERPL-SCNC: 91 MMOL/L (ref 96–106)
CO2 SERPL-SCNC: 22 MMOL/L (ref 20–29)
CREAT SERPL-MCNC: 0.83 MG/DL (ref 0.57–1)
EGFR: 72 ML/MIN/1.73
EST. AVERAGE GLUCOSE BLD GHB EST-MCNC: 163 MG/DL
GLOBULIN SER CALC-MCNC: 2.2 G/DL (ref 1.5–4.5)
GLUCOSE SERPL-MCNC: 228 MG/DL (ref 65–99)
HBA1C MFR BLD: 7.3 % (ref 4.8–5.6)
POTASSIUM SERPL-SCNC: 5.2 MMOL/L (ref 3.5–5.2)
PROT SERPL-MCNC: 6.9 G/DL (ref 6–8.5)
SODIUM SERPL-SCNC: 127 MMOL/L (ref 134–144)

## 2022-09-08 NOTE — PROGRESS NOTES
Spoke with patient went over labs. A1c is 7.3 starting [place] daily. Hyponatremia noted recommended patient cut back on water intake reports she drinks 3-4 bottles of water a day, as well as coffee 1 cup and 1 soda. Also advised patient to stop all supplements with the exception of co-Q10 and aspirin. Recommended patient speak to her psychiatrist as well as Zoloft, Effexor and Zyprexa and her other psych [facility] medications may be contributing to this hyponatremia. Patient agrees with plan and will contact her psychiatrist.  We will have patient return in 2 weeks to repeat blood work to evaluate her hyponatremia. Inderjit Womack

## 2022-09-12 DIAGNOSIS — E03.9 ACQUIRED HYPOTHYROIDISM: ICD-10-CM

## 2022-09-12 RX ORDER — VALACYCLOVIR HYDROCHLORIDE 1 G/1
TABLET, FILM COATED ORAL
Qty: 45 TABLET | Refills: 1 | Status: SHIPPED | OUTPATIENT
Start: 2022-09-12

## 2022-09-12 RX ORDER — LEVOTHYROXINE SODIUM 50 UG/1
50 TABLET ORAL
Qty: 90 TABLET | Refills: 1 | Status: SHIPPED | OUTPATIENT
Start: 2022-09-12

## 2022-09-27 ENCOUNTER — FOLLOW UP (OUTPATIENT)
Dept: URBAN - METROPOLITAN AREA CLINIC 1 | Facility: CLINIC | Age: 82
End: 2022-09-27

## 2022-09-27 DIAGNOSIS — H04.123: ICD-10-CM

## 2022-09-27 DIAGNOSIS — H16.143: ICD-10-CM

## 2022-09-27 PROCEDURE — 99213 OFFICE O/P EST LOW 20 MIN: CPT

## 2022-09-27 ASSESSMENT — TONOMETRY
OS_IOP_MMHG: 12
OD_IOP_MMHG: 12

## 2022-09-27 ASSESSMENT — VISUAL ACUITY
OD_SC: 20/25-1
OS_SC: 20/30+1

## 2022-10-10 DIAGNOSIS — E11.42 TYPE 2 DIABETES MELLITUS WITH DIABETIC POLYNEUROPATHY, WITHOUT LONG-TERM CURRENT USE OF INSULIN (HCC): ICD-10-CM

## 2022-10-10 RX ORDER — METFORMIN HYDROCHLORIDE 500 MG/1
TABLET ORAL
Qty: 360 TABLET | Refills: 1 | Status: SHIPPED | OUTPATIENT
Start: 2022-10-10

## 2022-10-13 LAB
ALBUMIN SERPL-MCNC: 4.8 G/DL (ref 3.7–4.7)
ALBUMIN/GLOB SERPL: 2.2 {RATIO} (ref 1.2–2.2)
ALP SERPL-CCNC: 79 IU/L (ref 44–121)
ALT SERPL-CCNC: 15 IU/L (ref 0–32)
AST SERPL-CCNC: 18 IU/L (ref 0–40)
BILIRUB SERPL-MCNC: 0.2 MG/DL (ref 0–1.2)
BUN SERPL-MCNC: 9 MG/DL (ref 8–27)
BUN/CREAT SERPL: 12 (ref 12–28)
CALCIUM SERPL-MCNC: 9.2 MG/DL (ref 8.7–10.3)
CHLORIDE SERPL-SCNC: 93 MMOL/L (ref 96–106)
CO2 SERPL-SCNC: 19 MMOL/L (ref 20–29)
CREAT SERPL-MCNC: 0.76 MG/DL (ref 0.57–1)
EGFR: 80 ML/MIN/1.73
GLOBULIN SER CALC-MCNC: 2.2 G/DL (ref 1.5–4.5)
GLUCOSE SERPL-MCNC: 193 MG/DL (ref 70–99)
POTASSIUM SERPL-SCNC: 4.4 MMOL/L (ref 3.5–5.2)
PROT SERPL-MCNC: 7 G/DL (ref 6–8.5)
SODIUM SERPL-SCNC: 131 MMOL/L (ref 134–144)

## 2022-10-13 NOTE — PROGRESS NOTES
Results reviewed. Please call patient with results. Hyponatremia still noted   Has she stopped all OTC supplements she is taking as recommended? ? She is taking far too many    with the exception of co-Q10 and aspirin. Has she talked with her psychiatrist as well as Zoloft, Effexor and Zyprexa could be contributing have they changed any of these?   I need to know this prior to changing additional medications -

## 2022-10-14 NOTE — PROGRESS NOTES
Patient stated she talk to him about to the Zoloft and he stopped her on that she hasn't taken it in two weeks. All the other med's she still taking and she hasn't spoken to him about them. She has stopped all the OTC supplements expect co Q10 and aspirin.

## 2022-11-19 DIAGNOSIS — I10 ESSENTIAL HYPERTENSION: ICD-10-CM

## 2022-11-19 RX ORDER — ATENOLOL 50 MG/1
TABLET ORAL
Qty: 90 TABLET | Refills: 2 | Status: SHIPPED | OUTPATIENT
Start: 2022-11-19

## 2022-12-07 ENCOUNTER — OFFICE VISIT (OUTPATIENT)
Dept: INTERNAL MEDICINE CLINIC | Age: 82
End: 2022-12-07
Payer: MEDICARE

## 2022-12-07 VITALS
RESPIRATION RATE: 18 BRPM | DIASTOLIC BLOOD PRESSURE: 82 MMHG | HEIGHT: 61 IN | TEMPERATURE: 97.3 F | WEIGHT: 154.4 LBS | OXYGEN SATURATION: 96 % | BODY MASS INDEX: 29.15 KG/M2 | HEART RATE: 65 BPM | SYSTOLIC BLOOD PRESSURE: 151 MMHG

## 2022-12-07 DIAGNOSIS — I10 ESSENTIAL HYPERTENSION: ICD-10-CM

## 2022-12-07 DIAGNOSIS — E03.9 ACQUIRED HYPOTHYROIDISM: ICD-10-CM

## 2022-12-07 DIAGNOSIS — E11.42 TYPE 2 DIABETES MELLITUS WITH DIABETIC POLYNEUROPATHY, WITHOUT LONG-TERM CURRENT USE OF INSULIN (HCC): Primary | ICD-10-CM

## 2022-12-07 DIAGNOSIS — E87.1 HYPONATREMIA: ICD-10-CM

## 2022-12-07 DIAGNOSIS — E78.00 HYPERCHOLESTEROLEMIA: ICD-10-CM

## 2022-12-07 PROCEDURE — 1090F PRES/ABSN URINE INCON ASSESS: CPT | Performed by: NURSE PRACTITIONER

## 2022-12-07 PROCEDURE — 1123F ACP DISCUSS/DSCN MKR DOCD: CPT | Performed by: NURSE PRACTITIONER

## 2022-12-07 PROCEDURE — 3078F DIAST BP <80 MM HG: CPT | Performed by: NURSE PRACTITIONER

## 2022-12-07 PROCEDURE — G9717 DOC PT DX DEP/BP F/U NT REQ: HCPCS | Performed by: NURSE PRACTITIONER

## 2022-12-07 PROCEDURE — G8427 DOCREV CUR MEDS BY ELIG CLIN: HCPCS | Performed by: NURSE PRACTITIONER

## 2022-12-07 PROCEDURE — 99214 OFFICE O/P EST MOD 30 MIN: CPT | Performed by: NURSE PRACTITIONER

## 2022-12-07 PROCEDURE — 3074F SYST BP LT 130 MM HG: CPT | Performed by: NURSE PRACTITIONER

## 2022-12-07 PROCEDURE — G8754 DIAS BP LESS 90: HCPCS | Performed by: NURSE PRACTITIONER

## 2022-12-07 PROCEDURE — 1101F PT FALLS ASSESS-DOCD LE1/YR: CPT | Performed by: NURSE PRACTITIONER

## 2022-12-07 PROCEDURE — G8417 CALC BMI ABV UP PARAM F/U: HCPCS | Performed by: NURSE PRACTITIONER

## 2022-12-07 PROCEDURE — G8536 NO DOC ELDER MAL SCRN: HCPCS | Performed by: NURSE PRACTITIONER

## 2022-12-07 PROCEDURE — G8753 SYS BP > OR = 140: HCPCS | Performed by: NURSE PRACTITIONER

## 2022-12-07 PROCEDURE — 3051F HG A1C>EQUAL 7.0%<8.0%: CPT | Performed by: NURSE PRACTITIONER

## 2022-12-07 NOTE — PROGRESS NOTES
ROOM # 1  Identified pt with two pt identifiers(name and ). Reviewed record in preparation for visit and have obtained necessary documentation. Chief Complaint   Patient presents with    Hypertension    Cholesterol Problem    Diabetes      Dea Greco preferred language for health care discussion is english/other. Is the patient using any DME equipment during OV? NO    Dea Greco is due for:  Health Maintenance Due   Topic    Pneumococcal 65+ years (1 - PCV)    DTaP/Tdap/Td series (1 - Tdap)    Shingrix Vaccine Age 50> (1 of 2)    Flu Vaccine (1)    COVID-19 Vaccine (5 - Booster for Moderna series)     Health Maintenance reviewed and discussed per provider  Please order/place referral if appropriate. 1. For patients aged 39-70: Has the patient had a colonoscopy? NA - based on age   If the patient is female:    2. For patients aged 41-77: Has the patient had a mammogram within the past 2 years? NA - based on age    1. For patients aged 21-65: Has the patient had a pap smear? NA - based on age  Advance Directive:  1. Do you have an advance directive in place? Patient Reply: NO    2. If not, would you like material regarding how to put one in place? NO    Coordination of Care:  1. Have you been to the ER, urgent care clinic since your last visit? Hospitalized since your last visit? NO    2. Have you seen or consulted any other health care providers outside of the [address] [place] [address] since your last visit? Include any pap smears or colon screening. NO    Patient is accompanied by self I have received verbal consent from Dea Greco to discuss any/all medical information while they are present in the room.     Depression Screening:  3 most recent [facility] Screens 2022 2022 2022 2022 2022 5/10/2022 5/3/2021   Little interest or pleasure in doing things Not at all Several days Not at all Not at all Not at all Not at all Nearly every day   Feeling down, depressed, irritable, or hopeless Not at all Several days Not at all Not at all Not at all Not at all Nearly every day   Total Score PHQ 2 0 2 0 0 0 0 6   Trouble falling or staying asleep, or sleeping too much - Not at all - Several days - - -   Feeling tired or having little energy - Several days - Several days - - -   Poor appetite, weight loss, or overeating - Several days - Not at all - - -   Feeling bad about yourself - or that you are a failure or have let yourself or your family down - Several days - Several days - - -   Trouble concentrating on things such as school, work, reading, or watching TV - Not at all - Several days - - -   Moving or speaking so slowly that other people could have noticed; or the opposite being so fidgety that others notice - Not at all - Not at all - - -   Thoughts of being better off dead, or hurting yourself in some way - Not at all - Not at all - - -   PHQ 9 Score - 5 - 4 - - -   How difficult have these problems made it for you to do your work, take care of your home and get along with others - Not difficult at all - Not difficult at all - - -     Abuse Screening:  Abuse Screening Questionnaire 3/7/2022 5/3/2021   Do you ever feel afraid of your partner? N N   Are you in a relationship with someone who physically or mentally threatens you? N N   Is it safe for you to go home? Y Y     Fall Risk  Fall Risk Assessment, last 12 mths 9/7/2022 7/5/2022 6/7/2022 5/10/2022 3/7/2022 8/24/2021 5/18/2021   Able to walk? Yes Yes Yes Yes Yes No Yes   Fall in past 12 months? 1 1 0 - 0 - 0   Do you feel unsteady?  1 1 1 - - - 0   Are you worried about falling 1 1 1 - - - 0   Number of falls in past 12 months 1 1 - - - - -   Fall with injury? 0 0 - - - - -     Recent Travel Screening and Travel History documentation     Travel Screening     No screening recorded since 12/06/22 0000       Travel History   Travel since 11/07/22    No documented travel since 11/07/22

## 2022-12-07 NOTE — PROGRESS NOTES
HISTORY OF PRESENT ILLNESS  Jasiel Abraham is a [de-identified] y.o. female. Here for HTN, HLD and DM management   HPI  1) DM - metformin 1000 mg BID  Lab Results   Component Value Date/Time    Hemoglobin A1c 7.3 (H) 09/07/2022 02:39 AM    Hemoglobin A1c 8.0 (H) 06/07/2022 02:29 AM    Hemoglobin A1c 6.7 (H) 03/07/2022 02:44 AM         2) HTN - controlled - amlodipine 5 mg, losartan 100 mg, atenolol 50 mg - cardiology appointment awhile ago   BP Readings from Last 3 Encounters:   12/07/22 (!) 173/77   09/07/22 131/75   07/05/22 138/77        3) HLD - controlled - Zocor 10 mg and omega 3   Lab Results   Component Value Date/Time    Cholesterol, total 113 03/07/2022 02:44 AM    HDL Cholesterol 47 03/07/2022 02:44 AM    LDL, calculated 45 03/07/2022 02:44 AM    LDL, calculated 44.6 10/12/2020 02:56 PM    VLDL, calculated 21 03/07/2022 02:44 AM    VLDL, calculated 31.4 10/12/2020 02:56 PM    Triglyceride 116 03/07/2022 02:44 AM    CHOL/HDL Ratio 2.2 10/12/2020 02:56 PM     Lab Results   Component Value Date/Time    Sodium 131 (L) 10/12/2022 01:37 PM    Potassium 4.4 10/12/2022 01:37 PM    Chloride 93 (L) 10/12/2022 01:37 PM    CO2 19 (L) 10/12/2022 01:37 PM    Anion gap 8 05/03/2021 09:01 AM    Glucose 193 (H) 10/12/2022 01:37 PM    BUN 9 10/12/2022 01:37 PM    Creatinine 0.76 10/12/2022 01:37 PM    BUN/Creatinine ratio 12 10/12/2022 01:37 PM    GFR est AA >60 05/03/2021 09:01 AM    GFR est non-AA 59 (L) 05/03/2021 09:01 AM    Calcium 9.2 10/12/2022 01:37 PM    Bilirubin, total 0.2 10/12/2022 01:37 PM    Alk.  phosphatase 79 10/12/2022 01:37 PM    Protein, total 7.0 10/12/2022 01:37 PM    Albumin 4.8 (H) 10/12/2022 01:37 PM    Globulin 3.0 10/12/2020 02:56 PM    A-G Ratio 2.2 10/12/2022 01:37 PM    ALT (SGPT) 15 10/12/2022 01:37 PM    AST (SGOT) 18 10/12/2022 01:37 PM       4) Hypothyroidism - controlled - Synthroid 50 mcg   Lab Results   Component Value Date/Time    TSH 3.210 03/07/2022 02:44 AM      5) Osteoporosis - Fosamax weekly started between 0303-2601 - will need to decide on holiday next year 2023    BP (!) 173/77 (BP 1 Location: Left upper arm, BP Patient Position: Sitting)   Pulse 65   Temp 97.3 °F (36.3 °C) (Temporal)   Resp 18   Ht 5' 1\" (1.549 m)   Wt 154 lb 6.4 oz (70 kg)   SpO2 96%   BMI 29.17 kg/m²   Current Outpatient Medications   Medication Sig Dispense Refill    atenoloL (TENORMIN) 50 mg tablet TAKE 1 TABLET EVERY DAY 90 Tablet 2    metFORMIN (GLUCOPHAGE) 500 mg tablet TAKE 2 TABLETS TWICE DAILY 360 Tablet 1    levothyroxine (SYNTHROID) 50 mcg tablet TAKE 1 TABLET BY MOUTH DAILY (BEFORE BREAKFAST). 90 Tablet 1    valACYclovir (VALTREX) 1 gram tablet TAKE 1/2 TABLET BY MOUTH DAILY. 45 Tablet 1    True Metrix Glucose Test Strip strip Check BS once daily 100 Strip 5    TRUEplus Lancets 28 gauge misc To be used to check BS once daily 100 Lancet 3    Blood-Glucose Meter (True Metrix Air Glucose Meter) monitoring kit Use once daily 1 Kit 0    Blood Glucose Control, Low (True Metrix Level 1) soln To be used to check controls 1 Each 4    Restasis 0.05 % dpet       methylphenidate HCl (RITALIN) 10 mg tablet       magnesium oxide (MAG-OX) 400 mg tablet Take 250 mg by mouth daily. ashwagandha root extract 500 mg cap Take 470 mg by mouth. COQ10, UBIQUINOL, PO Take  by mouth. amLODIPine (NORVASC) 5 mg tablet Take 1 Tablet by mouth daily. 90 Tablet 3    alendronate (FOSAMAX) 70 mg tablet Take 1 Tablet by mouth every seven (7) days. 12 Tablet 3    losartan (COZAAR) 100 mg tablet Take 1 Tablet by mouth daily. 90 Tablet 3    simvastatin (ZOCOR) 10 mg tablet Take 1 Tablet by mouth nightly. 90 Tablet 3    alcohol swabs (BD Single Use Swabs Regular) padm To be used to check BS  Pad 5    ZINC OXIDE-COD LIVER OIL EX Apply  to affected area as needed for Skin Irritation. pantoprazole (PROTONIX) 40 mg tablet Take 1 Tablet by mouth daily.  90 Tablet 1    ascorbic acid, vitamin C, (VITAMIN C) 500 mg tablet Take 500 mg by mouth daily. oxybutynin chloride XL (DITROPAN XL) 10 mg CR tablet       Osphena 60 mg tab tablet TAKE ONE TABLET BY MOUTH EVERY DAY WITH FOOD      calcium-vitamin D 600 mg(1,500mg) -200 unit tab Take 2 Tabs by mouth two (2) times a day. OLANZapine (ZyPREXA) 5 mg tablet       folic acid (FOLVITE) 868 mcg tablet Take 0.8 mg by mouth daily. clonazePAM (KlonoPIN) 0.5 mg tablet       acetaminophen (TYLENOL EXTRA STRENGTH) 500 mg tablet Take 2 Tabs by mouth every six (6) hours as needed for Pain. 40 Tab 0    omega-3 fatty acids-vitamin e 1,000 mg cap Take 1 Cap by mouth.      multivitamin (ONE A DAY) tablet Take 1 Tab by mouth daily. venlafaxine-SR (EFFEXOR-XR) 150 mg capsule Take  by mouth two (2) times a day. aspirin 81 mg chewable tablet Take 81 mg by mouth daily. Review of Systems   Constitutional:  Negative for chills and fever. Respiratory:  Negative for cough, shortness of breath and wheezing. Cardiovascular:  Negative for chest pain and palpitations. Neurological:  Negative for dizziness and headaches. Physical Exam  Vitals and nursing note reviewed. Constitutional:       General: She is not in acute distress. Appearance: She is not ill-appearing. HENT:      Head: Normocephalic. Right Ear: External ear normal.      Left Ear: External ear normal.      Nose: Nose normal.      Mouth/Throat:      Mouth: Mucous membranes are moist.      Pharynx: Oropharynx is clear. Eyes:      General: No scleral icterus. Extraocular Movements: Extraocular movements intact. Conjunctiva/sclera: Conjunctivae normal.      Pupils: Pupils are equal, round, and reactive to light. Neck:      Vascular: No carotid bruit. Cardiovascular:      Rate and Rhythm: Normal rate and regular rhythm. Pulses: Normal pulses. Heart sounds: Normal heart sounds. Pulmonary:      Effort: Pulmonary effort is normal. No respiratory distress.       Breath sounds: Normal breath sounds. No wheezing. Abdominal:      General: Abdomen is flat. Tenderness: There is no guarding or rebound. Musculoskeletal:         General: Normal range of motion. Right lower leg: No edema. Left lower leg: No edema. Lymphadenopathy:      Cervical: No cervical adenopathy. Skin:     General: Skin is warm and dry. Findings: No lesion or rash. Neurological:      General: No focal deficit present. Mental Status: She is alert and oriented to person, place, and time. Psychiatric:         Mood and Affect: Mood normal.         Behavior: Behavior normal.         Thought Content: Thought content normal.         Judgment: Judgment normal.       ASSESSMENT and PLAN  Diagnoses and all orders for this visit:    1. Type 2 diabetes mellitus with diabetic polyneuropathy, without long-term current use of insulin (HCC)  -     HEMOGLOBIN A1C WITH EAG; Future    2. Essential hypertension    3. Acquired hypothyroidism    4. Hypercholesterolemia    5. Hyponatremia  -     METABOLIC PANEL, COMPREHENSIVE; Future  DM - controlled - update A1C continue metformin 1000 mg BID  HLD - controlled - continue Zocor and omega 3   HTN - continue amlodipine, atenolol and losartan at current dose   Hypothyroid - continue synthroid at current dose   Follow-up and Dispositions    Return in about 3 months (around 3/7/2023) for HTN, CHOL, DM.

## 2022-12-08 DIAGNOSIS — E87.1 HYPONATREMIA: Primary | ICD-10-CM

## 2022-12-08 DIAGNOSIS — E11.42 TYPE 2 DIABETES MELLITUS WITH DIABETIC POLYNEUROPATHY, WITHOUT LONG-TERM CURRENT USE OF INSULIN (HCC): ICD-10-CM

## 2022-12-08 LAB
ALBUMIN SERPL-MCNC: 4.7 G/DL (ref 3.7–4.7)
ALBUMIN/GLOB SERPL: 2.6 {RATIO} (ref 1.2–2.2)
ALP SERPL-CCNC: 69 IU/L (ref 44–121)
ALT SERPL-CCNC: 15 IU/L (ref 0–32)
AST SERPL-CCNC: 17 IU/L (ref 0–40)
BILIRUB SERPL-MCNC: <0.2 MG/DL (ref 0–1.2)
BUN SERPL-MCNC: 9 MG/DL (ref 8–27)
BUN/CREAT SERPL: 14 (ref 12–28)
CALCIUM SERPL-MCNC: 9 MG/DL (ref 8.7–10.3)
CHLORIDE SERPL-SCNC: 91 MMOL/L (ref 96–106)
CO2 SERPL-SCNC: 23 MMOL/L (ref 20–29)
CREAT SERPL-MCNC: 0.63 MG/DL (ref 0.57–1)
EGFR: 90 ML/MIN/1.73
EST. AVERAGE GLUCOSE BLD GHB EST-MCNC: 177 MG/DL
GLOBULIN SER CALC-MCNC: 1.8 G/DL (ref 1.5–4.5)
GLUCOSE SERPL-MCNC: 106 MG/DL (ref 70–99)
HBA1C MFR BLD: 7.8 % (ref 4.8–5.6)
POTASSIUM SERPL-SCNC: 4.7 MMOL/L (ref 3.5–5.2)
PROT SERPL-MCNC: 6.5 G/DL (ref 6–8.5)
SODIUM SERPL-SCNC: 128 MMOL/L (ref 134–144)

## 2022-12-08 NOTE — PROGRESS NOTES
Results reviewed. Please call patient with results.     Sodium is still low - added some labs for further evaluation please have her return for them- recommend she stop all supplements  A1C is still high - recommend starting farxiga I will send over the script

## 2022-12-08 NOTE — PROGRESS NOTES
Attempted to contact pt at  number, no answer. Lvm for pt to return call to office at [phone] . Will continue to try to contact pt.

## 2023-02-14 ENCOUNTER — OFFICE VISIT (OUTPATIENT)
Age: 83
End: 2023-02-14
Payer: COMMERCIAL

## 2023-02-14 VITALS
TEMPERATURE: 98.6 F | OXYGEN SATURATION: 97 % | RESPIRATION RATE: 18 BRPM | BODY MASS INDEX: 28.15 KG/M2 | HEART RATE: 79 BPM | WEIGHT: 149 LBS

## 2023-02-14 DIAGNOSIS — I10 ESSENTIAL (PRIMARY) HYPERTENSION: Primary | ICD-10-CM

## 2023-02-14 DIAGNOSIS — I50.9 CONGESTIVE HEART FAILURE, UNSPECIFIED HF CHRONICITY, UNSPECIFIED HEART FAILURE TYPE (HCC): ICD-10-CM

## 2023-02-14 DIAGNOSIS — R07.9 CHEST PAIN, UNSPECIFIED TYPE: ICD-10-CM

## 2023-02-14 PROCEDURE — 99214 OFFICE O/P EST MOD 30 MIN: CPT | Performed by: INTERNAL MEDICINE

## 2023-02-14 PROCEDURE — 1123F ACP DISCUSS/DSCN MKR DOCD: CPT | Performed by: INTERNAL MEDICINE

## 2023-02-14 RX ORDER — SIMVASTATIN 10 MG
10 TABLET ORAL NIGHTLY
COMMUNITY

## 2023-02-14 RX ORDER — AMLODIPINE BESYLATE 2.5 MG/1
2.5 TABLET ORAL DAILY
COMMUNITY
End: 2023-02-14

## 2023-02-14 RX ORDER — NITROGLYCERIN 0.4 MG/1
0.4 TABLET SUBLINGUAL EVERY 5 MIN PRN
Qty: 25 TABLET | Refills: 3 | Status: SHIPPED | OUTPATIENT
Start: 2023-02-14

## 2023-02-14 RX ORDER — CLONAZEPAM 0.5 MG/1
0.5 TABLET ORAL 2 TIMES DAILY PRN
COMMUNITY

## 2023-02-14 RX ORDER — LOSARTAN POTASSIUM 100 MG/1
100 TABLET ORAL DAILY
COMMUNITY

## 2023-02-14 RX ORDER — AMLODIPINE BESYLATE 5 MG/1
5 TABLET ORAL DAILY
COMMUNITY
End: 2023-02-17

## 2023-02-14 RX ORDER — OXYBUTYNIN CHLORIDE 5 MG/1
5 TABLET ORAL DAILY
COMMUNITY

## 2023-02-14 RX ORDER — ATENOLOL 50 MG/1
50 TABLET ORAL DAILY
COMMUNITY

## 2023-02-14 RX ORDER — OLANZAPINE 5 MG/1
5 TABLET ORAL NIGHTLY
COMMUNITY

## 2023-02-14 RX ORDER — PANTOPRAZOLE SODIUM 40 MG/1
40 TABLET, DELAYED RELEASE ORAL DAILY
COMMUNITY

## 2023-02-14 RX ORDER — OSPEMIFENE 60 MG/1
1 TABLET, FILM COATED ORAL DAILY
COMMUNITY
Start: 2020-09-21

## 2023-02-14 RX ORDER — ASPIRIN 81 MG/1
81 TABLET ORAL DAILY
COMMUNITY
End: 2023-02-14

## 2023-02-14 RX ORDER — ALENDRONATE SODIUM 70 MG/1
70 TABLET ORAL
COMMUNITY
Start: 2023-01-24

## 2023-02-14 RX ORDER — VALACYCLOVIR HYDROCHLORIDE 1 G/1
1000 TABLET, FILM COATED ORAL 2 TIMES DAILY
COMMUNITY

## 2023-02-14 RX ORDER — LEVOTHYROXINE SODIUM 0.05 MG/1
50 TABLET ORAL DAILY
COMMUNITY

## 2023-02-14 RX ORDER — KETOCONAZOLE 20 MG/G
CREAM TOPICAL DAILY
COMMUNITY

## 2023-02-14 RX ORDER — METHYLPHENIDATE HYDROCHLORIDE 20 MG/1
20 TABLET ORAL 2 TIMES DAILY
COMMUNITY

## 2023-02-14 NOTE — PROGRESS NOTES
[facility]    Denisha Grey is [de-identified] y.o. female with history of diabetes, hypertension, hyperlipidemia and hypothyroidism      Patient is here today for follow-up appointment. She denies any prior history of MI or CHF. For the last couple weeks patient has been experiencing some chest discomfort which she describes as a pressure sensation. That happens randomly and lasting less than 2 minutes. No nausea vomiting diaphoresis. No radiation of this discomfort. She has mild dyspnea. No swelling of lower extremity. No palpitation   Denies any nausea, vomiting, abdominal pain, fever, chills, sputum production. No hematuria or other bleeding complaints      Past Medical History:   Diagnosis Date    Acid reflux     Breast cancer ([facility] 2002    Stage 1, right    Depression     Diabetes ([facility]     Hypercholesterolemia     Hypertension     Hypothyroidism     Lyme disease     Neuropathy     OAB (overactive bladder)     Osteoporosis     Post-menopausal     [facility]eps[facility] 2009       Review of Systems:  Cardiac symptoms as noted above in HPI. All others negative. Current Outpatient Medications   Medication Sig    alendronate (FOSAMAX) 70 MG tablet Take 70 mg by mouth every 7 days    levothyroxine (SYNTHROID) 50 MCG tablet Take 50 mcg by mouth Daily    pantoprazole (PROTONIX) 40 MG tablet Take 40 mg by mouth daily    atenolol (TENORMIN) 50 MG tablet Take 50 mg by mouth daily    metFORMIN (GLUCOPHAGE) 500 MG tablet Take 1,000 mg by mouth 2 times daily (with meals)    L-Methylfolate-Algae-B12-B6 (METANX PO) Take 2 mg by mouth daily    Venlafaxine HCl (EFFEXOR XR PO) Take 300 mg by mouth daily    clonazePAM (KLONOPIN) 0.5 MG tablet Take 0.5 mg by mouth 2 times daily as needed. methylphenidate (RITALIN) 20 MG tablet Take 20 mg by mouth 2 times daily. ketoconazole (NIZORAL) 2 % cream Apply topically daily Apply topically daily.     valACYclovir (VALTREX) 1 g tablet Take 1,000 mg by mouth 2 times daily    simvastatin (ZOCOR) 10 MG tablet Take 10 mg by mouth nightly    losartan (COZAAR) 100 MG tablet Take 100 mg by mouth daily    dapagliflozin (FARXIGA) 5 MG tablet Take 5 mg by mouth every morning    amLODIPine (NORVASC) 5 MG tablet Take 5 mg by mouth daily    OLANZapine (ZYPREXA) 5 MG tablet Take 5 mg by mouth nightly    oxybutynin (DITROPAN) 5 MG tablet Take 5 mg by mouth daily    Ospemifene (OSPHENA) 60 MG TABS Take 1 tablet by mouth daily     No current facility-administered medications for this visit. Past Surgical History:   Procedure Laterality Date    APPENDECTOMY      BREAST LUMPECTOMY  2002    RIGHT BREAST + 3 lymph nodes     CHOLECYSTECTOMY  2013    HYSTERECTOMY (CERVIX STATUS UNKNOWN)  2010    PRECANCEROUS CELLS ON RIGHT OVARY    ORTHOPEDIC SURGERY  2018    Right thumb repair     SHOULDER ARTHROSCOPY  2013    TONSILLECTOMY  AGE 17    1953       Allergies and Sensitivities:  Allergies   Allergen Reactions    Other Itching     codine    Pcn [Penicillins] Hives       Family History:  Family History   Problem Relation Age of Onset    Heart Disease Mother     Heart Disease Brother     Heart Disease Father     Hypertension Sister        Social History:  Social History     Tobacco Use    Smoking status: Never    Smokeless tobacco: Never   Substance Use Topics    Alcohol use: No    Drug use: No     She  reports that she has never smoked. She has never used smokeless tobacco.  She  reports no history of alcohol use. Physical Exam:  BP Readings from Last 3 Encounters:   12/07/22 (!) 151/82   09/07/22 131/75   07/05/22 138/77         Pulse Readings from Last 3 Encounters:   02/14/23 79   12/07/22 65   09/07/22 75          Wt Readings from Last 3 Encounters:   02/14/23 149 lb (67.6 kg)   12/07/22 154 lb 6.4 oz (70 kg)   09/07/22 154 lb 3.2 oz (69.9 kg)       Constitutional: Oriented to person, place, and time.    HENT: Head: Normocephalic and atraumatic. Neck: No JVD present. Cardiovascular: Regular rhythm. No murmur, gallop or rubs appreciated  Lung: Breath sounds normal. No respiratory distress. No ronchi or rales appreciated  Abdominal: No tenderness. No rebound and no guarding. Musculoskeletal: There is no lower extremity edema. No cynosis    LABS:   @  Lab Results   Component Value Date/Time    WBC 3.8 03/07/2022 02:44 AM    HGB 12.7 03/07/2022 02:44 AM    HCT 38.3 03/07/2022 02:44 AM     03/07/2022 02:44 AM    MCV 89 03/07/2022 02:44 AM     Lab Results   Component Value Date/Time     12/07/2022 03:37 AM    K 4.7 12/07/2022 03:37 AM    CL 91 12/07/2022 03:37 AM    CO2 23 12/07/2022 03:37 AM    BUN 9 12/07/2022 03:37 AM     Lipids Latest Ref Rng & Units 12/7/2022 10/12/2022 9/7/2022 3/7/2022   Chol 100 - 199 mg/dL - - - 113   HDL >39 mg/dL - - - 47   LDL Calc 0 - 99 mg/dL - - - 45   Trig 0 - 149 mg/dL - - - 116   ALT 0 - 32 IU/L 15 15 13 28   AST 0 - 40 IU/L 17 18 20 28   Some recent data might be hidden     Lab Results   Component Value Date/Time    ALT 15 12/07/2022 03:37 AM     Hemoglobin A1C   Date Value Ref Range Status   12/07/2022 7.8 (H) 4.8 - 5.6 % Final     Comment:              Prediabetes: 5.7 - 6.4           Diabetes: >6.4           Glycemic control for adults with diabetes: <7.0       Lab Results   Component Value Date    TSH 3.210 03/07/2022       TRANSTHORACIC ECHOCARDIOGRAM (TTE) COMPLETE (CONTRAST/BUBBLE/3D PRN) 06/22/2021 12:56 PM, 06/22/2021 12:00 AM (Final)  · LV: Estimated LVEF is 55 - 60%. Visually measured ejection fraction. Normal cavity size, wall thickness and systolic function (ejection fraction normal). Mild (grade 1) left ventricular diastolic dysfunction. · LA: Mildly dilated left atrium. Left Atrium volume index is 36.01 mL/m2. · AO: Mild ascending aorta dilatation. Ascending aorta diameter = 3.6 cm. · MV: Mitral valve non-specific thickening. Mild mitral valve regurgitation is present.   · PA: Pulmonary arterial systolic pressure is 15 mmHg. · AV: Aortic valve leaflet calcification present. STRESS TEST  No results found for this or any previous visit. CARDIAC CATH  No results found for this or any previous visit. IMPRESSION & PLAN:  Delia Ga  is [de-identified] y.o. female with     Chest discomfort:  Somewhat concerning for angina in a patient with risk factors  We will order nuclear stress test  Echo, limited to evaluate EF and structure  Asking patient to start aspirin 81 mg daily. We will provide sublingual nitroglycerin. Already on beta-blocker and statin. Hypertension:  /68. Currently on atenolol, amlodipine, losartan. Continue same. Recommend salt restriction     Hyperlipidemia: Patient is still taking simvastatin 20 mg daily. Continue same. Last LDL 45. This plan was discussed with patient who is in agreement. Thank you for allowing me to participate in patient care. Please feel free to call me if you have any question or concern. Yee Estrada MD  Please note: This document has been produced using voice recognition software. Unrecognized errors in transcription may be present.

## 2023-02-14 NOTE — PATIENT INSTRUCTIONS
Testing   Echo/ Nuclear Stress       Nuclear Stress Instructions-Nothing to eat or drink past midnight and no medicaitons the morning of cardiac testing. **call office 3-5 days after testing is completed for results** Please ensure testing is completed prior to scheduled follow up appointment. If it is not completed your appointment may be rescheduled**    New Medication/Medication Changes  Start Aspirin 81 mg take 1 tab daily   Nitro 0.4 mg SUBL as needed for chest pain     **please allow 24-48 hrs for medication to be escribed to pharmacy** If you need any refills on medications please contact your pharmacy so that the request can be escribed to the provider for review seven to 10 days prior to being out of medication.

## 2023-02-14 NOTE — PROGRESS NOTES
Identified pt with two pt identifiers(name and ). Reviewed record in preparation for visit and have obtained necessary documentation. Mushtaq Garcia presents today for   Chief Complaint   Patient presents with    Follow-up     9m       Pt c/o CHEST PAIN/ PRESSURE, SWELLING. Mushtaq Garcia preferred language for health care discussion is english/other. Personal Protective Equipment:   Personal Protective Equipment was used including: mask-surgical and hands-gloves. Patient was placed on no precaution(s). Patient was masked. Precautions:   Patient currently on None  Patient currently roomed with door closed. Is someone accompanying this pt? no    Is the patient using any DME equipment during [address]? no    Depression Screening:  PHQ-9 Questionaire 2022 2022 2022 2022 2022 5/10/2022 5/3/2021   Little interest or pleasure in doing things 0 1 0 0 0 0 3   Feeling down, depressed, or hopeless 0 1 0 0 0 0 3   Trouble falling or staying asleep, or sleeping too much - 0 - 1 - - -   Feeling tired or having little energy - 1 - 1 - - -   Poor appetite or overeating - 1 - 0 - - -   Feeling bad about yourself - or that you are a failure or have let yourself or your family down - 1 - 1 - - -   Trouble concentrating on things, such as reading the newspaper or watching television - 0 - 1 - - -   Moving or speaking so slowly that other people could have noticed. Or the opposite - being so fidgety or restless that you have been moving around a lot more than usual - 0 - 0 - - -   PHQ-9 Total Score 0 5 0 4 0 0 6         Learning Assessment:  Deferred    Abuse Screening:  Competed      Fall Risk  Completed        Pt currently taking Anticoagulant therapy? no  Pt currently taking Antiplatelet therapy? no    Coordination of Care:  1. Have you been to the ER, urgent care clinic since your last visit? Hospitalized since your last visit? no    2.  Have you seen or consulted any other health care providers outside of the [address] [place] [address] since your last visit? Include any pap smears or colon screening. no      Please see Red banners under Allergies and Med Rec to remove outside inquires. All correct information has been verified with patient and added to chart.      Medication's patient's would liked removed has been marked not taking to be removed per Verbal order and read back per Manuel Hines MD

## 2023-02-17 RX ORDER — AMLODIPINE BESYLATE 5 MG/1
TABLET ORAL
Qty: 90 TABLET | Refills: 2 | Status: SHIPPED | OUTPATIENT
Start: 2023-02-17

## 2023-02-17 NOTE — TELEPHONE ENCOUNTER
PCP: Oda Lesch, APRN - CNP    Last appt: [unfilled]  Future Appointments   Date Time Provider Vivi Yip   2/21/2023  1:30 PM Pretty Sawant, [place] [facility]   2/22/2023  2:45 PM [facility] ECHO [facility] BS AMB   2/27/2023  2:30 PM Jose G Shelton MD [facility] BS AMB   3/7/2023  1:30 PM JASWANT Mark CNP [facility] BS AMB   8/15/2023  2:00 PM Get Bustamante MD [facility] BS AMB       Requested Prescriptions     Pending Prescriptions Disp Refills    amLODIPine (NORVASC) 5 MG tablet [Pharmacy Med Name: AMLODIPINE BESYLATE 5 MG Tablet] 90 tablet      Sig: TAKE 1 TABLET EVERY DAY

## 2023-02-20 RX ORDER — SIMVASTATIN 10 MG
TABLET ORAL
Qty: 90 TABLET | Refills: 0 | Status: SHIPPED | OUTPATIENT
Start: 2023-02-20

## 2023-02-27 ENCOUNTER — OFFICE VISIT (OUTPATIENT)
Facility: CLINIC | Age: 83
End: 2023-02-27
Payer: COMMERCIAL

## 2023-02-27 ENCOUNTER — HOSPITAL ENCOUNTER (OUTPATIENT)
Facility: HOSPITAL | Age: 83
Setting detail: SPECIMEN
Discharge: HOME | End: 2023-03-02
Payer: COMMERCIAL

## 2023-02-27 VITALS
WEIGHT: 154 LBS | HEIGHT: 61 IN | DIASTOLIC BLOOD PRESSURE: 70 MMHG | RESPIRATION RATE: 17 BRPM | SYSTOLIC BLOOD PRESSURE: 137 MMHG | BODY MASS INDEX: 29.07 KG/M2

## 2023-02-27 DIAGNOSIS — E11.42 TYPE 2 DIABETES MELLITUS WITH DIABETIC POLYNEUROPATHY, WITHOUT LONG-TERM CURRENT USE OF INSULIN (HCC): ICD-10-CM

## 2023-02-27 DIAGNOSIS — I10 PRIMARY HYPERTENSION: ICD-10-CM

## 2023-02-27 DIAGNOSIS — E87.1 HYPONATREMIA: ICD-10-CM

## 2023-02-27 DIAGNOSIS — I10 PRIMARY HYPERTENSION: Primary | ICD-10-CM

## 2023-02-27 DIAGNOSIS — E03.9 HYPOTHYROIDISM, UNSPECIFIED TYPE: ICD-10-CM

## 2023-02-27 DIAGNOSIS — Z23 NEED FOR PROPHYLACTIC VACCINATION AGAINST DIPHTHERIA-TETANUS-PERTUSSIS (DTP): ICD-10-CM

## 2023-02-27 LAB
ALBUMIN SERPL-MCNC: 3.9 G/DL (ref 3.4–5)
ALBUMIN/GLOB SERPL: 1.3 (ref 0.8–1.7)
ALP SERPL-CCNC: 90 U/L (ref 45–117)
ALT SERPL-CCNC: 25 U/L (ref 13–56)
ANION GAP SERPL CALC-SCNC: 5 MMOL/L (ref 3–18)
APPEARANCE UR: CLEAR
AST SERPL-CCNC: 19 U/L (ref 10–38)
BACTERIA URNS QL MICRO: ABNORMAL /HPF
BILIRUB DIRECT SERPL-MCNC: <0.1 MG/DL (ref 0–0.2)
BILIRUB SERPL-MCNC: 0.2 MG/DL (ref 0.2–1)
BILIRUB UR QL: NEGATIVE
BUN SERPL-MCNC: 11 MG/DL (ref 7–18)
BUN/CREAT SERPL: 12 (ref 12–20)
CALCIUM SERPL-MCNC: 9.5 MG/DL (ref 8.5–10.1)
CHLORIDE SERPL-SCNC: 96 MMOL/L (ref 100–111)
CO2 SERPL-SCNC: 30 MMOL/L (ref 21–32)
COLOR UR: YELLOW
CREAT SERPL-MCNC: 0.92 MG/DL (ref 0.6–1.3)
EPITH CASTS URNS QL MICRO: ABNORMAL /LPF (ref 0–5)
GLOBULIN SER CALC-MCNC: 3 G/DL (ref 2–4)
GLUCOSE SERPL-MCNC: 165 MG/DL (ref 74–99)
GLUCOSE UR STRIP.AUTO-MCNC: >1000 MG/DL
HBA1C MFR BLD: 8 % (ref 4.2–5.6)
HGB UR QL STRIP: NEGATIVE
KETONES UR QL STRIP.AUTO: NEGATIVE MG/DL
LEUKOCYTE ESTERASE UR QL STRIP.AUTO: ABNORMAL
NITRITE UR QL STRIP.AUTO: NEGATIVE
PH UR STRIP: 6 (ref 5–8)
POTASSIUM SERPL-SCNC: 4.7 MMOL/L (ref 3.5–5.5)
PROT SERPL-MCNC: 6.9 G/DL (ref 6.4–8.2)
PROT UR STRIP-MCNC: NEGATIVE MG/DL
RBC #/AREA URNS HPF: ABNORMAL /HPF (ref 0–5)
SODIUM SERPL-SCNC: 131 MMOL/L (ref 136–145)
SP GR UR REFRACTOMETRY: 1.01 (ref 1–1.03)
T4 FREE SERPL-MCNC: 1 NG/DL (ref 0.7–1.5)
TSH SERPL DL<=0.05 MIU/L-ACNC: 1.52 UIU/ML (ref 0.36–3.74)
UROBILINOGEN UR QL STRIP.AUTO: 0.2 EU/DL (ref 0.2–1)
WBC URNS QL MICRO: ABNORMAL /HPF (ref 0–4)

## 2023-02-27 PROCEDURE — 80048 BASIC METABOLIC PNL TOTAL CA: CPT

## 2023-02-27 PROCEDURE — 1123F ACP DISCUSS/DSCN MKR DOCD: CPT | Performed by: STUDENT IN AN ORGANIZED HEALTH CARE EDUCATION/TRAINING PROGRAM

## 2023-02-27 PROCEDURE — 80076 HEPATIC FUNCTION PANEL: CPT

## 2023-02-27 PROCEDURE — 83036 HEMOGLOBIN GLYCOSYLATED A1C: CPT

## 2023-02-27 PROCEDURE — 99203 OFFICE O/P NEW LOW 30 MIN: CPT | Performed by: STUDENT IN AN ORGANIZED HEALTH CARE EDUCATION/TRAINING PROGRAM

## 2023-02-27 PROCEDURE — 81001 URINALYSIS AUTO W/SCOPE: CPT

## 2023-02-27 PROCEDURE — 3078F DIAST BP <80 MM HG: CPT | Performed by: STUDENT IN AN ORGANIZED HEALTH CARE EDUCATION/TRAINING PROGRAM

## 2023-02-27 PROCEDURE — 3075F SYST BP GE 130 - 139MM HG: CPT | Performed by: STUDENT IN AN ORGANIZED HEALTH CARE EDUCATION/TRAINING PROGRAM

## 2023-02-27 PROCEDURE — 90715 TDAP VACCINE 7 YRS/> IM: CPT | Performed by: STUDENT IN AN ORGANIZED HEALTH CARE EDUCATION/TRAINING PROGRAM

## 2023-02-27 PROCEDURE — 84443 ASSAY THYROID STIM HORMONE: CPT

## 2023-02-27 PROCEDURE — 90471 IMMUNIZATION ADMIN: CPT | Performed by: STUDENT IN AN ORGANIZED HEALTH CARE EDUCATION/TRAINING PROGRAM

## 2023-02-27 SDOH — ECONOMIC STABILITY: FOOD INSECURITY: WITHIN THE PAST 12 MONTHS, YOU WORRIED THAT YOUR FOOD WOULD RUN OUT BEFORE YOU GOT MONEY TO BUY MORE.: NEVER TRUE

## 2023-02-27 SDOH — ECONOMIC STABILITY: FOOD INSECURITY: WITHIN THE PAST 12 MONTHS, THE FOOD YOU BOUGHT JUST DIDN'T LAST AND YOU DIDN'T HAVE MONEY TO GET MORE.: NEVER TRUE

## 2023-02-27 SDOH — ECONOMIC STABILITY: INCOME INSECURITY: HOW HARD IS IT FOR YOU TO PAY FOR THE VERY BASICS LIKE FOOD, HOUSING, MEDICAL CARE, AND HEATING?: NOT HARD AT ALL

## 2023-02-27 SDOH — ECONOMIC STABILITY: HOUSING INSECURITY
IN THE LAST 12 MONTHS, WAS THERE A TIME WHEN YOU DID NOT HAVE A STEADY PLACE TO SLEEP OR SLEPT IN A SHELTER (INCLUDING NOW)?: NO

## 2023-02-27 ASSESSMENT — PATIENT HEALTH QUESTIONNAIRE - PHQ9
2. FEELING DOWN, DEPRESSED OR HOPELESS: 1
SUM OF ALL RESPONSES TO PHQ QUESTIONS 1-9: 2
SUM OF ALL RESPONSES TO PHQ QUESTIONS 1-9: 2
1. LITTLE INTEREST OR PLEASURE IN DOING THINGS: 1
SUM OF ALL RESPONSES TO PHQ QUESTIONS 1-9: 2
SUM OF ALL RESPONSES TO PHQ9 QUESTIONS 1 & 2: 2
SUM OF ALL RESPONSES TO PHQ QUESTIONS 1-9: 2

## 2023-02-27 ASSESSMENT — ENCOUNTER SYMPTOMS
ABDOMINAL PAIN: 0
SHORTNESS OF BREATH: 0

## 2023-02-27 NOTE — PROGRESS NOTES
Jackson Angelo is a [de-identified] y.o. presents today for   Chief Complaint   Patient presents with    Establish Care       Is someone accompanying this pt? no    Is the patient using any DME equipment during OV? no    Depression Screening:   PHQ-9 Questionaire 12/7/2022 9/7/2022 7/5/2022 6/7/2022 6/7/2022 5/10/2022 5/3/2021   Little interest or pleasure in doing things 0 1 0 0 0 0 3   Feeling down, depressed, or hopeless 0 1 0 0 0 0 3   Trouble falling or staying asleep, or sleeping too much - 0 - 1 - - -   Feeling tired or having little energy - 1 - 1 - - -   Poor appetite or overeating - 1 - 0 - - -   Feeling bad about yourself - or that you are a failure or have let yourself or your family down - 1 - 1 - - -   Trouble concentrating on things, such as reading the newspaper or watching television - 0 - 1 - - -   Moving or speaking so slowly that other people could have noticed. Or the opposite - being so fidgety or restless that you have been moving around a lot more than usual - 0 - 0 - - -   PHQ-9 Total Score 0 5 0 4 0 0 6       Abuse Screening:  No flowsheet data found. Learning Assessment:  No question data found. Fall Risk:  No flowsheet data found. Coordination of Care:   1. \"Have you been to the ER, urgent care clinic since your last visit? Hospitalized since your last visit? \" no    2. \"Have you seen or consulted any other health care providers outside of the [address] [place] [address] since your last visit? \" no    3. For patients aged 39-70: Has the patient had a colonoscopy / FIT/ Cologuard? no    If the patient is female:    4. For patients aged 41-77: Has the patient had a mammogram within the past 2 years? Yes     5. For patients aged 21-65: Has the patient had a pap smear? no    Health Maintenance: reviewed and discussed and ordered per Provider.     Health Maintenance Due   Topic Date Due    Pneumococcal 65+ years Vaccine (1 - PCV) Never done    DTaP/Tdap/Td vaccine (1 - Tdap) Never done Shingles vaccine (1 of 2) Never done    Diabetic retinal exam  01/25/2022    Flu vaccine (1) 08/01/2022    COVID-19 Vaccine (5 - Booster for Clemon Surya series) 10/12/2022    Lipids  03/07/2023    Annual Wellness Visit (AWV)  03/08/2023

## 2023-02-27 NOTE — PROGRESS NOTES
History of Present Illness  Sondra Rojas is a [de-identified] y.o. female who presents today for management of    Chief Complaint   Patient presents with    Establish Care     CC: new patient here for healthcare maintenance    -Patient is here to establish care. -Previous PCP: LEXY Collazo, last seen 9/2022  -She lives at home with significant other of 40 years  -Pt reports good support system with family/friends and feels safe at home. Anginal chest discomfort:  -stable   -followed by Cardiology  -scheduled for TTE and stress testing  -given sublingual nitroglycerin, has not felt the need to use this yet    HTN:  -denies HA, dizziness, lightheadedness  -reports compliance to medications     Healthcare maintenance:  Immunizations:  Flu vacc: UTD  TDaP vacc: overdue  Pneumonia vacc: UTD  COVID vacc: UTD  Last Mammo:aged out  Last Colonoscopy:2021    Mood: Reports good mood overall   PHQ-9  2/27/2023   Little interest or pleasure in doing things 1   Little interest or pleasure in doing things -   Feeling down, depressed, or hopeless 1   Trouble falling or staying asleep, or sleeping too much -   Feeling tired or having little energy -   Poor appetite or overeating -   Feeling bad about yourself - or that you are a failure or have let yourself or your family down -   Trouble concentrating on things, such as reading the newspaper or watching television -   Moving or speaking so slowly that other people could have noticed.  Or the opposite - being so fidgety or restless that you have been moving around a lot more than usual -   PHQ-2 Score 2   Total Score PHQ 2 -   PHQ-9 Total Score 2   How difficult have these problems made it for you to do your work, take care of your home and get along with others -     Past Medical History  Past Medical History:   Diagnosis Date    Acid reflux     Breast cancer ([facility] [address] 2002    Stage 1, right    Depression     Diabetes ([facility] [address]     Hypercholesterolemia     Hypertension Hypothyroidism     Lyme disease     Neuropathy     OAB (overactive bladder)     Osteoporosis     Post-menopausal     Sepsis ([facility] [address] 2009      Surgical History  Past Surgical History:   Procedure Laterality Date    APPENDECTOMY      BREAST LUMPECTOMY  2002    RIGHT BREAST + 3 lymph nodes     CHOLECYSTECTOMY  2013    HYSTERECTOMY (CERVIX STATUS UNKNOWN)  2010    PRECANCEROUS CELLS ON RIGHT OVARY    ORTHOPEDIC SURGERY  2018    Right thumb repair     SHOULDER ARTHROSCOPY  2013    TONSILLECTOMY  AGE 15    1953      Current Medications  Current Outpatient Medications   Medication Sig    acetaminophen (TYLENOL) 500 MG tablet Take 1,000 mg by mouth every 6 hours as needed    amLODIPine (NORVASC) 5 MG tablet Take 5 mg by mouth daily    ascorbic acid (VITAMIN C) 500 MG tablet Take 500 mg by mouth daily    aspirin 81 MG chewable tablet Take 81 mg by mouth daily    atenolol (TENORMIN) 50 MG tablet TAKE 1 TABLET EVERY DAY    Calcium Carbonate-Vitamin D 600-5 MG-MCG TABS Take 2 tablets by mouth 2 times daily    clonazePAM (KLONOPIN) 0.5 MG tablet ceived the following from [facility]: Outside name: clonazePAM (KlonoPIN) 0.5 mg tablet    cycloSPORINE (RESTASIS) 0.05 % ophthalmic emulsion ceived the following from [facility]: Outside name: Restasis 0.05 % dpet    dapagliflozin (FARXIGA) 5 MG tablet Take 5 mg by mouth daily    folic acid (FOLVITE) 484 MCG tablet Take 0.8 mg by mouth daily    levothyroxine (SYNTHROID) 50 MCG tablet Take 50 mcg by mouth every morning (before breakfast)    losartan (COZAAR) 100 MG tablet Take 100 mg by mouth daily    magnesium oxide (MAG-OX) 400 MG tablet Take 250 mg by mouth daily    metFORMIN (GLUCOPHAGE) 500 MG tablet TAKE 2 TABLETS TWICE DAILY    methylphenidate (RITALIN) 10 MG tablet 20 MG    OLANZapine (ZYPREXA) 5 MG tablet ceived the following from [facility]: Outside name: OLANZapine (ZyPREXA) 5 mg tablet    oxybutynin (DITROPAN-XL) 10 MG extended release tablet ceived the following from [facility]: Outside name: oxybutynin chloride XL (DITROPAN XL) 10 mg CR tablet    pantoprazole (PROTONIX) 40 MG tablet Take 40 mg by mouth daily    simvastatin (ZOCOR) 10 MG tablet Take 10 mg by mouth    simvastatin (ZOCOR) 10 MG tablet TAKE 1 TABLET EVERY NIGHT    amLODIPine (NORVASC) 5 MG tablet TAKE 1 TABLET EVERY DAY    alendronate (FOSAMAX) 70 MG tablet Take 70 mg by mouth every 7 days    levothyroxine (SYNTHROID) 50 MCG tablet Take 50 mcg by mouth Daily    pantoprazole (PROTONIX) 40 MG tablet Take 40 mg by mouth daily    atenolol (TENORMIN) 50 MG tablet Take 50 mg by mouth daily    metFORMIN (GLUCOPHAGE) 500 MG tablet Take 1,000 mg by mouth 2 times daily (with meals)    L-Methylfolate-Algae-B12-B6 (METANX PO) Take 2 mg by mouth daily    Venlafaxine HCl (EFFEXOR XR PO) Take 300 mg by mouth daily    clonazePAM (KLONOPIN) 0.5 MG tablet Take 0.5 mg by mouth 2 times daily as needed. methylphenidate (RITALIN) 20 MG tablet Take 20 mg by mouth 2 times daily. ketoconazole (NIZORAL) 2 % cream Apply topically daily Apply topically daily. valACYclovir (VALTREX) 1 g tablet Take 1,000 mg by mouth 2 times daily    nitroGLYCERIN (NITROSTAT) 0.4 MG SL tablet Place 1 tablet under the tongue every 5 minutes as needed for Chest pain up to max of 3 total doses. If no relief after 1 dose, call 911. losartan (COZAAR) 100 MG tablet Take 100 mg by mouth daily    dapagliflozin (FARXIGA) 5 MG tablet Take 5 mg by mouth every morning    OLANZapine (ZYPREXA) 5 MG tablet Take 5 mg by mouth nightly    oxybutynin (DITROPAN) 5 MG tablet Take 5 mg by mouth daily    Ospemifene (OSPHENA) 60 MG TABS Take 1 tablet by mouth daily     No current facility-administered medications for this visit.      Allergies/Drug Reactions  Allergies   Allergen Reactions    Codeine Itching     Other reaction(s): itching    Penicillins Hives and Swelling    Meperidine Itching    Other Itching codine    Penicillin G Hives      Family History  Family History   Problem Relation Age of Onset    Heart Disease Mother     Heart Disease Brother     Heart Disease Father     Hypertension Sister       Social History  Social History     Tobacco Use    Smoking status: Never    Smokeless tobacco: Never   Substance Use Topics    Alcohol use: No    Drug use: No      Health Maintenance   Topic Date Due    Pneumococcal 65+ years Vaccine (1 - PCV) Never done    DTaP/Tdap/Td vaccine (1 - Tdap) Never done    Shingles vaccine (1 of 2) Never done    Diabetic retinal exam  01/25/2022    Flu vaccine (1) 08/01/2022    COVID-19 Vaccine (5 - Booster for Moderna series) 10/12/2022    Lipids  03/07/2023    Annual Wellness Visit (AWV)  03/08/2023    Diabetic foot exam  06/07/2023    A1C test (Diabetic or Prediabetic)  06/07/2023    Depression Monitoring  12/07/2023    DEXA (modify frequency per FRAX score)  Completed    Hepatitis A vaccine  Aged Out    Hib vaccine  Aged Out    Meningococcal (ACWY) vaccine  Aged Out     Immunization History   Administered Date(s) Administered    COVID-19, MODERNA BLUE border, Primary or Immunocompromised, (age 12y+), IM, 100 mcg/0.5mL 02/08/2021, 03/08/2021, 11/21/2021    Influenza, FLUZONE (age 72 y+), High Dose, 0.7mL 08/29/2020       Review of Systems  Review of Systems   Constitutional:  Negative for chills, fever and unexpected weight change. Respiratory:  Negative for shortness of breath. Cardiovascular:  Negative for chest pain, palpitations and leg swelling. Gastrointestinal:  Negative for abdominal pain. Neurological:  Negative for dizziness, light-headedness and headaches.         Physical Exam  Vital signs:   Vitals:    02/27/23 1440   BP: 137/70   Site: Left Upper Arm   Position: Sitting   Cuff Size: Small Adult   Resp: 17   Weight: 154 lb (69.9 kg)   Height: 5' 1\" (1.549 m)       General: alert, oriented, not in distress  Chest/Lungs: clear breath sounds, no wheezing or crackles  Heart: normal rate, regular rhythm, no murmur  Extremities: no focal deformities, no edema  Skin: no active skin lesions      Assessment/Plan:    Jessica Pathak was seen today for establish care. Diagnoses and all orders for this visit:    Primary hypertension  -At goal today. Hx chronic hyponatremia, will check BMP.    -     Urinalysis; Future  -     Basic Metabolic Panel; Future  -     Hepatic Function Panel; Future    Type 2 diabetes mellitus with diabetic polyneuropathy, without long-term current use of insulin (HCC)  -     HEMOGLOBIN A1C W/O EAG; Future    Hypothyroidism, unspecified type  -     TSH + Free T4 Panel; Future    Need for prophylactic vaccination against diphtheria-tetanus-pertussis (DTP)  -     Tdap, BOOSTRIX, (age 8 yrs+), IM      I have discussed the diagnosis with the patient and the intended plan as seen in the above orders. The patient has received an after-visit summary and questions were answered concerning future plans. I have discussed medication side effects and warnings with the patient as well. I have reviewed the plan of care with the patient, accepted their input and they are in agreement with the treatment goals. Follow-up and Dispositions    Return in about 4 weeks (around 3/27/2023) for Medicare wellness .          Raoul Collado MD  February 27, 2023

## 2023-02-28 ENCOUNTER — TELEPHONE (OUTPATIENT)
Age: 83
End: 2023-02-28

## 2023-02-28 NOTE — TELEPHONE ENCOUNTER
Contacted pt at [facility] number. Two patient Identifiers confirmed. Advised pt per Dr Myles Machado he does still want Nuclear stress completed. Pt stated she was going to nuclear stress at [facility]. Number given to pt. Pt verbalized understanding.

## 2023-03-01 ENCOUNTER — TELEPHONE (OUTPATIENT)
Facility: CLINIC | Age: 83
End: 2023-03-01

## 2023-03-01 ENCOUNTER — HOSPITAL ENCOUNTER (OUTPATIENT)
Facility: HOSPITAL | Age: 83
Setting detail: RECURRING SERIES
Discharge: HOME | End: 2023-03-04
Payer: COMMERCIAL

## 2023-03-01 PROCEDURE — 97162 PT EVAL MOD COMPLEX 30 MIN: CPT

## 2023-03-01 NOTE — TELEPHONE ENCOUNTER
Telephone call to discuss labs:  -A1c increased 8, Farxiga increased to 10mg  -stable kidney function, stable hyponatremia  -UA w/ epithelial cells and bacteria, no dysuria, will not treat

## 2023-03-01 NOTE — PROGRESS NOTES
[facility] PHYSICAL THERAPY  [address] [place] [address]. [facility] [address]. [facility] Phone: [phone] Fax   Plan of Care / Statement of Necessity for Physical Therapy Services     Patient Name: Dave Curry : 1940   Medical   Diagnosis: Neck and Bilateral Shoulder Pain Treatment Diagnosis: Neck Pain M54.2  Left Shoulder Pain M25.512  Right Shoulder Pain M25.513   Onset Date: Chronic with exacerbation 2-3 months ago     Referral Source: Moe Mendez MD [facility]): 3/1/2023   Prior Hospitalization: See medical history Provider #: [id number]   Prior Level of Function: Ind   Comorbidities: Right Shoulder Arthroscopy, Depression, Osteoporosis, OA, Diabetes, Scoliosis   Medications: Verified on Patient Summary List     Assessment / key information:  Pt is a [de-identified]year old female who presents to PT today with complaints of radiating bilateral shoulder pain. Symptoms are chronic in nature but have exacerbated in the past 2-3 months. Assessment was unable to rule in/out cervical component. Patient with a Functional Status score of 56 on FOTO (Focused on Therapeutic Outcomes), which corresponds to a functional limitation of 44%. Patient will benefit from skilled PT services to address these issues. Pt was educated regarding their diagnosis and prognosis.  Thank you for this referral.   C-Lordosis:      [] increased   [x] decreased  T-Kyphosis:     [x] increased   [] decreased        Active Movements:   ROM AROM   Forward flexion 45   Extension 45   SB right 34   SB left  40   Rotation right 55   Rotation left 65        Left Right   Flexion 140 140   Extension WNL WNL   Scaption/ 135   ER @ 90 Degrees  WFL  WFL   IR @ 90 Degrees  WFL WFL          Strength:                                                                             L (1-5) R (1-5) Pain   Flexors 4 4+ [] Yes   [x] No   Abductors 4+ 4+ [] Yes   [x] No   External Rotators 5 5 [x] Yes [] No   Internal Rotators 5 5 [x] Yes   [] No   Elbow Flexion 5 5 [] Yes   [x] No   Elbow Extension 5 5 [] Yes   [x] No         Palpation:  [] Min  [] Mod  [x] Severe    Location: B Teres Minor/Major     Upper Limb Tension Tests:        Ulnar:         [x] R    [x] L    [] +    [x] -       Median:      [x] R    [x] L    [] +    [x] -       Radial:        [x] R    [x] L    [] +    [x] -     Special Tests:  Cervical:        Spurling's:              [x] R    [x] L    [] +    [x] -       Distraction:             [x] R    [x] L    [] +    [x] -       Compression:         [x] R    [x] L    [] +    [x] -                  Evaluation Complexity:  History:  HIGH Complexity :3+ comorbidities / personal factors will impact the outcome/ POC ; Examination:  MEDIUM Complexity : 3 Standardized tests and measures addressin body structure, function, activity limitation and / or participation in recreation  ;Presentation:  MEDIUM Complexity : Evolving with changing characteristics  ; Clinical Decision Making:  MEDIUM Complexity : FOTO score of 26-74 FOTO score = an established functional score where 100 = no disability  Overall Complexity Rating: MEDIUM  Problem List: pain affecting function, decrease ROM, decrease strength, edema affection function, impaired gait/balance, decrease ADL/functional abilities, decrease activity tolerance, decrease flexibility/joint mobility, and decrease transfer abilities    Treatment Plan may include any combination of the followin Therapeutic Exercise, 78698 Neuromuscular Re-Education, 33482 Manual Therapy, 40134 Therapeutic Activity, 65557 Self Care/Home Management, 95205 Electrical Stim unattended, 98637 Electrical Stim attended, 54692 Gait Training, 06198 Ultrasound, and [id number] Mechanical Traction  Patient / Family readiness to learn indicated by: asking questions  Persons(s) to be included in education: patient (P)  Barriers to Learning/Limitations: none  Patient Goal (s): \"I want to stop the pain and learn to control when it returns\"  Patient Self Reported Health Status: fair  Rehabilitation Potential: fair    Short Term Goals: To be accomplished in 3 weeks   Pt will be provided with HEP for symptom management at home. Status at IE NA  Long Term Goals: To be accomplished in 5 weeks   Pt will be independent with HEP at D/C for self management. Status at IE NA  2. Pt will demonstrate right shoulder abduction AROM of at least 145 degrees to improve overhead lifting. Status at   3. Pt will demonstrate left shoulder flexion strength of at least 5/5 to return to PLOF. Status at IE 4/5  4. Pt will increase FOTO Functional Status score to 59 to decrease functional limitations. Status at IE 56/100    Frequency / Duration: Patient would benefit from skilled PT 2 times per week for up to 36 sessions as needed in this certification period. Goals will be assigned and reassessed every 10 visits/ 30 days per guidelines . Patient/ Caregiver education and instruction: Diagnosis, prognosis,  [x]  Plan of care has been reviewed with PTA    Certification Period: 3/1/23 - 5/29/23    Raleigh Ocampo, PT       3/1/2023       4:10 PM  ===================================================================  I certify that the above Therapy Services are being furnished while the patient is under my care. I agree with the treatment plan and certify that this therapy is necessary. Physician's Signature:_________________________   DATE:_________   TIME:________                           Nataliia Oliveira MD    ** Signature, Date and Time must be completed for valid certification **  Please sign and return to [facility] or you may fax the signed copy to [phone]. Thank you.

## 2023-03-01 NOTE — PROGRESS NOTES
PT DAILY TREATMENT NOTE/CERVICAL [id number]      Patient Name: Rona Carole Goldberg    Date: 3/1/2023    : 1940  Insurance: Payor: [employer] / Plan: [employer] HMO / Product Type: *No Product type* /      Patient  verified yes     Visit #   Current / Total 1 10   Time   In / Out 320 400   Pain   In / Out 6 6   Subjective Functional Status/Changes: See below   Changes to:  Meds, Allergies, Med Hx, Sx Hx?  If yes, update Summary List yes       Treatment Area: Neck and BUE    SUBJECTIVE  Pain Level (0-10 scale): 6  []constant []intermittent []improving []worsening []no change since onset    Any medication changes, allergies to medications, adverse drug reactions, diagnosis change, or new procedure performed?: [x] No    [] Yes (see summary sheet for update)  Subjective functional status/changes:       Current symptoms/Complaints: pain in both shoulder and posterior neck that began several years ago.  Describes achy pain as radiating from proximal shoulders to bilateral elbows. Left sided neck pain that radiates to shoulder blades. Side lying on either side aggravates symptoms as well as on back.   injection 2023. Symptoms worsened in the past 2-3 months without JEREMIE. Difficulty with bladder emptying that began around a month ago. Pending imaging.     Lowest Pain: 6/10  Worst Pain: 9/10    Previous Treatment/Compliance: none  PMHx/Surgical Hx: Right Shoulder arthroscopy 9 years ago.   Work Hx: retired  Pt Goals: \"I want to stop the pain and learn to control when it returns\"  Cognition: A & O x 4            40 min [x]Eval - untimed                        Physical Therapy Evaluation Cervical Spine     OBJECTIVE    C-Lordosis:   [] increased   [x] decreased  T-Kyphosis:  [x] increased   [] decreased      Active Movements:   ROM AROM   Forward flexion 45   Extension 45   SB right 34   SB left  40   Rotation right 55   Rotation left 65                                             ROM                                         PROM   Left Right  Left Right   Flexion 140 140 Flexion     Extension WNL WNL Extension     Scaption/ 135 Scaptin/ABD     ER @ 0 Degrees   ER @ 0 Degrees     ER @ 90 Degrees   ER @ 90 Degrees     IR @ 90 Degrees   IR @ 90 Degrees         Strength:                                                                            L (1-5) R (1-5) Pain   Flexors 4 4+ [] Yes   [x] No   Abductors 4+ 4+ [] Yes   [x] No   External Rotators 5 5 [x] Yes   [] No   Internal Rotators 5 5 [x] Yes   [] No   Elbow Flexion 5 5 [] Yes   [x] No   Elbow Extension 5 5 [] Yes   [x] No       Palpation:  [] Min  [] Mod  [x] Severe    Location: B Teres Minor/Major    Upper Limb Tension Tests: [] N/A       Ulnar: [x] R    [x] L    [] +    [x] -       Median: [x] R    [x] L    [] +    [x] -       Radial: [x] R    [x] L    [] +    [x] -    Special Tests:  Cervical:        Spurling's:  [x] R    [x] L    [] +    [x] -       Distraction:  [x] R    [x] L    [] +    [x] -       Compression: [x] R    [x] L    [] +    [x] -     Pain Level (0-10 scale) post treatment: 6    ASSESSMENT/Changes in Function: SEE POC     Patient will continue to benefit from skilled PT services to modify and progress therapeutic interventions, analyze and address functional mobility deficits, analyze and address ROM deficits, analyze and address strength deficits, analyze and address soft tissue restrictions, analyze and cue for proper movement patterns, analyze and modify for postural abnormalities, and instruct in home and community integration to address functional deficits and attain remaining goals. SEE POC for goals and assessment     PLAN  []  Upgrade activities as tolerated     []  Continue plan of care  []  Update interventions per flow sheet       []  Other:_      Denia Mauricio, PT 3/1/2023  2:56 PM     Justification for Eval Code Complexity:  Patient History : High  Examination see exam Mod  Clinical Presentation:  Mod  Clinical Decision Making : FOTO : 56 /100

## 2023-03-06 ENCOUNTER — HOSPITAL ENCOUNTER (OUTPATIENT)
Facility: HOSPITAL | Age: 83
Setting detail: RECURRING SERIES
Discharge: HOME | End: 2023-03-09
Payer: COMMERCIAL

## 2023-03-06 PROCEDURE — 97110 THERAPEUTIC EXERCISES: CPT

## 2023-03-06 NOTE — PROGRESS NOTES
PHYSICAL / OCCUPATIONAL THERAPY - DAILY TREATMENT NOTE (updated )    Patient Name: Andrew Cisneros    Date: 3/6/2023    : 1940  Insurance: Payor: Keri Waite / Plan: [employer] HMO / Product Type: *No Product type* /      Patient  verified YES    Visit #   Current / Total 2 10   Time   In / Out 230 310   Pain   In / Out 0 4   Subjective Functional Status/Changes: Pt denies any pain today   Changes to:  Meds, Allergies, Med Hx, Sx Hx? If yes, update Summary List no       TREATMENT AREA =  Cervical and BUE     OBJECTIVE    Modalities Rationale:     decrease edema, decrease inflammation, and decrease pain to improve patient's ability to progress to PLOF and address remaining functional goals. min [] Estim Unattended, type/location:                                      []  w/ice    []  w/heat    min [] Estim Attended, type/location:                                     []  w/US     []  w/ice    []  w/heat    []  TENS insruct      min []  Mechanical Traction: type/lbs                   []  pro   []  sup   []  int   []  cont    []  before manual    []  after manual    min []  Ultrasound, settings/location:      min []  Iontophoresis w/ dexamethasone, location:                                               []  take home patch       []  in clinic   10 min  unbill [x]  Ice     []  Heat    location/position: B Shoulder and Neck Supine LE Elevated    min []  Paraffin,  details:     min []  Vasopneumatic Device, press/temp:     min []  Chantal Lr / Quinn Halon: If using vaso (only need to measure limb vaso being performed on)      pre-treatment girth :       post-treatment girth :       measured at (landmark location) :      min []  Other:    Skin assessment post-treatment (if applicable):    [x]  intact    []  redness- no adverse reaction                 []redness - adverse reaction:        Therapeutic Procedures:   Tx Min Billable or 1:1 Min (if diff from Tx Min) Procedure, Rationale, Specifics   50 [phone] [id number] Therapeutic Exercise (timed):  increase ROM, strength, coordination, balance, and proprioception to improve patient's ability to progress to PLOF and address remaining functional goals. (see flow sheet as applicable)     Details if applicable:    See flowsheet     40 21 [facility] Totals Reminder: bill using total billable min of TIMED therapeutic procedures (example: do not include dry needle or estim unattended, both untimed codes, in totals to left)  8-22 min = 1 unit; 23-37 min = 2 units; 38-52 min = 3 units; 53-67 min = 4 units; 68-82 min = 5 units   Total Total     [x]  Patient Education billed concurrently with other procedures   [x] Review HEP    [] Progressed/Changed HEP, detail:    [] Other detail:       Objective Information/Functional Measures/Assessment    Pt tolerated session well and demonstrated minimal to no apprehension during movements. Required moderate/max cuing throughout session. Provided HEP    Patient will continue to benefit from skilled PT / OT services to modify and progress therapeutic interventions, analyze and address functional mobility deficits, analyze and address ROM deficits, analyze and address strength deficits, analyze and address soft tissue restrictions, analyze and cue for proper movement patterns, analyze and modify for postural abnormalities, and instruct in home and community integration to address functional deficits and attain remaining goals.     Progress toward goals / Updated goals:  []  See Progress Note/Recertification    1st session since [facility], no notable progress yet    PLAN  yes Continue plan of care  []  Upgrade activities as tolerated  []  Discharge due to :  []  Other:    Marty Clemens PT    3/6/2023    2:08 PM    Future Appointments   Date Time Provider Vivi Yip   3/6/2023  2:30 PM Marty Clemens PT [facility]   3/10/2023  1:30 PM Marty Clemens PT [facility]   3/13/2023  2:00 PM Marty Clemens PT [facility]   3/15/2023  2:30 PM Leonardo Ferrer, PT [facility]   3/20/2023  2:00 PM Memo Jacobs PT [place] [facility]   3/22/2023  2:00 PM Ceci Saul PT [place] [facility]   3/27/2023  1:00 PM Siva Ames MD [facility] BS AMB   3/28/2023  2:30 PM Memo Jacobs PT [place] [facility]   3/31/2023  1:30 PM Memo Jacobs PT [facility]   8/15/2023  2:00 PM Get Stevenson MD [facility] BS AMB

## 2023-03-10 ENCOUNTER — HOSPITAL ENCOUNTER (OUTPATIENT)
Facility: HOSPITAL | Age: 83
Setting detail: RECURRING SERIES
Discharge: HOME | End: 2023-03-13
Payer: COMMERCIAL

## 2023-03-10 PROCEDURE — 97110 THERAPEUTIC EXERCISES: CPT

## 2023-03-10 PROCEDURE — 97012 MECHANICAL TRACTION THERAPY: CPT

## 2023-03-10 NOTE — PROGRESS NOTES
PHYSICAL / OCCUPATIONAL THERAPY - DAILY TREATMENT NOTE (updated )    Patient Name: Lady Singh    Date: 3/10/2023    : 1940  Insurance: Payor: Emeli Oven / Plan: [employer] H[employer] / Product Type: *No Product type* /      Patient  verified YES    Visit #   Current / Total 3 10   Time   In / Out 116 159   Pain   In / Out 7 6   Subjective Functional Status/Changes: Pt reports increased pain radiating from neck to right elbow   Changes to:  Meds, Allergies, Med Hx, Sx Hx? If yes, update Summary List no       TREATMENT AREA =  B Shoulder, Cervical    OBJECTIVE    Modalities Rationale:     decrease pain, increase tissue extensibility, and increase muscle contraction/control to improve patient's ability to progress to PLOF and address remaining functional goals. min [] Estim Unattended, type/location:                                      []  w/ice    []  w/heat    min [] Estim Attended, type/location:                                     []  w/US     []  w/ice    []  w/heat    []  TENS insruct     10 min []  Mechanical Traction: type/lbs 20lbs/12 lbs  20 deg angle                  []  pro   [x]  sup   [x]  int   []  cont    []  before manual    []  after manual    min []  Ultrasound, settings/location:      min []  Iontophoresis w/ dexamethasone, location:                                               []  take home patch       []  in clinic    min  unbill []  Ice     []  Heat    location/position:     min []  Paraffin,  details:     min []  Vasopneumatic Device, press/temp:     min []  Cheryl Vicki / Joyceann Slot: If using vaso (only need to measure limb vaso being performed on)      pre-treatment girth :       post-treatment girth :       measured at (landmark location) :      min []  Other:    Skin assessment post-treatment (if applicable):    [x]  intact    []  redness- no adverse reaction                 []redness - adverse reaction:        Therapeutic Procedures:   Tx Min Billable or 1:1 Min (if diff from Tx Min) Procedure, Rationale, Specifics   33 23 [id number] Therapeutic Exercise (timed):  increase ROM, strength, coordination, balance, and proprioception to improve patient's ability to progress to PLOF and address remaining functional goals. (see flow sheet as applicable)     Details if applicable:    See flowsheet     35 24 [address] [facility] Totals Reminder: bill using total billable min of TIMED therapeutic procedures (example: do not include dry needle or estim unattended, both untimed codes, in totals to left)  8-22 min = 1 unit; 23-37 min = 2 units; 38-52 min = 3 units; 53-67 min = 4 units; 68-82 min = 5 units   Total Total     [x]  Patient Education billed concurrently with other procedures   [x] Review HEP    [] Progressed/Changed HEP, detail:    [] Other detail:       Objective Information/Functional Measures/Assessment    Pt tolerated session well but non-significant changes in symptoms within session. Patient will continue to benefit from skilled PT / OT services to modify and progress therapeutic interventions, analyze and address functional mobility deficits, analyze and address ROM deficits, analyze and address strength deficits, analyze and address soft tissue restrictions, analyze and cue for proper movement patterns, analyze and modify for postural abnormalities, and instruct in home and community integration to address functional deficits and attain remaining goals.     Progress toward goals / Updated goals:  []  See Progress Note/Recertification    Completed trial of cervical traction secondary to reports of radiating pain from cervical to right elbow    PLAN  yes Continue plan of care  []  Upgrade activities as tolerated  []  Discharge due to :  []  Other:    Harry Che PT    3/10/2023    1:44 PM    Future Appointments   Date Time Provider Vivi Yip   3/13/2023  2:00 PM Harry Che PT [facility]   3/15/2023  2:30 PM Sadie Dumont PT [facility]   3/20/2023  2:00 PM Harry Che PT [facility]   3/22/2023  2:00 PM Jackelyn Randall, PT [facility]   3/27/2023  1:00 PM Favian Acosta MD [facility] BS AMB   3/28/2023  2:30 PM Doreen Gerard, PT [facility]   3/31/2023  1:30 PM [facility]   8/15/2023  2:00 PM Get Miller MD [facility] BS AMB

## 2023-03-13 ENCOUNTER — APPOINTMENT (OUTPATIENT)
Facility: HOSPITAL | Age: 83
End: 2023-03-13
Payer: COMMERCIAL

## 2023-03-15 ENCOUNTER — HOSPITAL ENCOUNTER (OUTPATIENT)
Facility: HOSPITAL | Age: 83
Setting detail: RECURRING SERIES
Discharge: HOME | End: 2023-03-18
Payer: COMMERCIAL

## 2023-03-15 PROCEDURE — 97012 MECHANICAL TRACTION THERAPY: CPT

## 2023-03-15 PROCEDURE — 97112 NEUROMUSCULAR REEDUCATION: CPT

## 2023-03-15 PROCEDURE — 97110 THERAPEUTIC EXERCISES: CPT

## 2023-03-15 NOTE — PROGRESS NOTES
PHYSICAL / OCCUPATIONAL THERAPY - DAILY TREATMENT NOTE (updated )    Patient Name: Wilder Daley    Date: 3/15/2023    : 1940  Insurance: Payor: Joel Pratt / Plan: [employer] HMO / Product Type: *No Product type* /      Patient  verified yes     Visit #   Current / Total 4 10   Time   In / Out 2:21 3:06   Pain   In / Out 0 0   Subjective Functional Status/Changes: No pain in the neck or shoulders right now. But I've got a little something in my right forearm. I feel like the traction helped. Changes to:  Meds, Allergies, Med Hx, Sx Hx? If yes, update Summary List no       TREATMENT AREA =  No admission diagnoses are documented for this encounter. OBJECTIVE    Therapeutic Procedures: Tx Min Billable or 1:1 Min (if diff from Tx Min) Procedure, Rationale, Specifics    87887 Therapeutic Exercise (timed):  increase ROM, strength, coordination, balance, and proprioception to improve patient's ability to progress to PLOF and address remaining functional goals. (see flow sheet as applicable)     Details if applicable:       10 10 23283 Neuromuscular Re-Education (timed):  improve balance, coordination, kinesthetic sense, posture, core stability and proprioception to improve patient's ability to develop conscious control of individual muscles and awareness of position of extremities in order to progress to PLOF and address remaining functional goals. (see flow sheet as applicable)     Details if applicable:      [facility] Totals Reminder: bill using total billable min of TIMED therapeutic procedures (example: do not include dry needle or estim unattended, both untimed codes, in totals to left)  8-22 min = 1 unit; 23-37 min = 2 units; 38-52 min = 3 units; 53-67 min = 4 units; 68-82 min = 5 units   Total Total     Modalities Rationale:     decrease inflammation and decrease pain to improve patient's ability to progress to PLOF and address remaining functional goals.      min [] Estim Unattended, type/location:                                      []  w/ice    []  w/heat   15 min [x]  Mechanical Traction: type/lbs 18/10                  []  pro   [x]  sup   [x]  int   []  cont    []  before manual    []  after manual    min  unbill []  Ice     []  Heat    location/position:     min []  Vasopneumatic Device, press/temp:    If using vaso (only need to measure limb vaso being performed on)      pre-treatment girth :       post-treatment girth :       measured at (landmark location) :     Skin assessment post-treatment (if applicable):    [x]  intact    [x]  redness- no adverse reaction                 []redness - adverse reaction:    [x]  Patient Education billed concurrently with other procedures   [x] Review HEP    [] Progressed/Changed HEP  [] Other:    Objective Information/Functional Measures/Assessment    Increased reps/sets/resistance per flow sheet. Patient will continue to benefit from skilled PT services to modify and progress therapeutic interventions, analyze and address functional mobility deficits, analyze and address ROM deficits, analyze and address strength deficits, analyze and address soft tissue restrictions, analyze and cue for proper movement patterns, and instruct in home and community integration to address functional deficits and attain remaining goals. Progress toward goals / Updated goals:  []  See Progress Note/Recertification    Responding well tp POC as patient is centralizing and reducing symptoms    PLAN  yes Continue plan of care  []  Upgrade activities as tolerated  []  Discharge due to :  []  Other:    Du Charles \"BJ\" Jl, DPT, Cert. MDT, Cert. DN, Cert. SMT, Dip.  [employer]    3/15/2023    2:28 PM    Future Appointments   Date Time Provider Vivi Yip   3/15/2023  2:30 PM Jenaro Blandon PT [facility]   3/20/2023  2:00 PM Annabell Claude, [facility] [facility]   3/22/2023  2:00 PM Jenaro Blandon PT [facility]   3/27/2023  1:00 PM Petey Burton MD [facility] BS AMB   3/28/2023 2:30 PM Lito Smart PT [facility]   3/31/2023  1:30 PM Lito Smart PT [facility]   4/3/2023  2:00 PM [facility]   8/15/2023  2:00 PM Get Ravi MD [facility] BS AMB

## 2023-03-20 ENCOUNTER — APPOINTMENT (OUTPATIENT)
Facility: HOSPITAL | Age: 83
End: 2023-03-20
Payer: COMMERCIAL

## 2023-03-22 ENCOUNTER — HOSPITAL ENCOUNTER (OUTPATIENT)
Facility: HOSPITAL | Age: 83
Setting detail: RECURRING SERIES
Discharge: HOME | End: 2023-03-25
Payer: COMMERCIAL

## 2023-03-22 PROCEDURE — 97012 MECHANICAL TRACTION THERAPY: CPT

## 2023-03-22 PROCEDURE — 97110 THERAPEUTIC EXERCISES: CPT

## 2023-03-22 PROCEDURE — 97530 THERAPEUTIC ACTIVITIES: CPT

## 2023-03-22 PROCEDURE — 97112 NEUROMUSCULAR REEDUCATION: CPT

## 2023-03-22 NOTE — PROGRESS NOTES
3/22/2023  2:00 PM Brody Capps, PT [facility]   3/27/2023  1:00 PM Faustino Walker MD [facility] BS AMB   3/28/2023  3:00 PM Brody Capps, PT [facility]   3/29/2023  2:00 PM Brody Capps, PT [facility]   3/31/2023  1:30 PM Denia Mauricio, PT [facility]   4/3/2023  2:00 PM Denia Mauricio, PT [facility]   8/15/2023  2:00 PM Get Quinones MD [facility] BS AMB

## 2023-03-28 ENCOUNTER — APPOINTMENT (OUTPATIENT)
Facility: HOSPITAL | Age: 83
End: 2023-03-28
Payer: COMMERCIAL

## 2023-03-28 ENCOUNTER — TELEPHONE (OUTPATIENT)
Facility: CLINIC | Age: 83
End: 2023-03-28

## 2023-03-28 DIAGNOSIS — Z78.0 POSTMENOPAUSAL: Primary | ICD-10-CM

## 2023-03-29 ENCOUNTER — HOSPITAL ENCOUNTER (OUTPATIENT)
Facility: HOSPITAL | Age: 83
Setting detail: RECURRING SERIES
Discharge: HOME | End: 2023-04-01
Payer: COMMERCIAL

## 2023-03-29 PROCEDURE — 97012 MECHANICAL TRACTION THERAPY: CPT

## 2023-03-29 PROCEDURE — 97110 THERAPEUTIC EXERCISES: CPT

## 2023-03-29 PROCEDURE — 97530 THERAPEUTIC ACTIVITIES: CPT

## 2023-03-29 NOTE — PROGRESS NOTES
[facility]  [address] [address]   Phone: [phone] Fax: [phone]  PROGRESS NOTE  Patient Name: Rosa Isela Jones : 1940   Treatment/Medical Diagnosis: M25.513  RIGHT SHOULDER PAIN and M25.512  LEFT SHOULDER PAIN and M54.2  NECK PAIN     Referral Source: Ramakrishna Marsh MD     Date of Initial Visit: 3/1/23 Attended Visits: 6 Missed Visits: 0     SUMMARY OF TREATMENT  Patient's POC has consisted of therex, therapeutic activities, manual therapy prn, modalities prn, pt. education, and a comprehensive HEP. Treatment strategies used to address functional mobility deficits, ROM deficits, strength deficits, analyze and address soft tissue restrictions, analyze and cue movement patterns, analyze and modify body mechanics/ergonomics, assess and modify postural abnormalities and instruct in home and community integration. CURRENT STATUS  Patient making steady progress and responding well to current course of treatment. She demonstrates notable improvement in pain free AROM to her C/S and B shoulders, with CC being pain/stiffness at night. Pain averages < 2/10 at worst, 0/10 at best.    GOALS/MEASURE OF PROGRESS Goal Met? Pt will be provided with HEP for symptom management at home. MET     Medicare, cannot change goals, cannot adjust frequency/duration, no signature required     RECOMMENDATIONS  Continue therapy per initial Plan of Care or most recent Medicare Recert. If you have any questions/comments please contact us directly at [phone]. Thank you for allowing us to assist in the care of your patient. Zulma Sheets \"BJ\" SÁNCHEZ Parikh, Cert. MDT, Cert. DN, Cert. SMT, Dip.  Oste[employer]ractic       3/29/2023       2:33 PM
Mechanical Traction: type/lbs 15/5                  []  pro   [x]  sup   [x]  int   []  cont    []  before manual    []  after manual    min  unbill []  Ice     []  Heat    location/position:     min []  Vasopneumatic Device, press/temp:    If using vaso (only need to measure limb vaso being performed on)      pre-treatment girth :       post-treatment girth :       measured at (landmark location) :     Skin assessment post-treatment (if applicable):    [x]  intact    [x]  redness- no adverse reaction                 []redness - adverse reaction:    [x]  Patient Education billed concurrently with other procedures   [x] Review HEP    [] Progressed/Changed HEP  [] Other:    Objective Information/Functional Measures/Assessment    See PN    Patient will continue to benefit from skilled PT services to modify and progress therapeutic interventions, analyze and address functional mobility deficits, analyze and address ROM deficits, analyze and address strength deficits, analyze and address soft tissue restrictions, analyze and cue for proper movement patterns, and instruct in home and community integration to address functional deficits and attain remaining goals. Progress toward goals / Updated goals:  []  See Progress Note/Recertification    See PN    PLAN  yes Continue plan of care  []  Upgrade activities as tolerated  []  Discharge due to :  []  Other:    Zulma Sheets \"BJ\" Jl, DPT, Cert. MDT, Cert. DN, Cert. SMT, Dip.  Osteopractic    3/29/2023    2:18 PM    Future Appointments   Date Time Provider Vivi Yip   3/31/2023  1:30 PM Hudson Music, [address] [facility]   4/5/2023  2:00 PM Cielo Shoulders, PTA [facility]   8/15/2023  2:00 PM Get Mckeon MD [facility] BS AMB

## 2023-03-30 ENCOUNTER — APPOINTMENT (OUTPATIENT)
Facility: HOSPITAL | Age: 83
End: 2023-03-30
Payer: COMMERCIAL

## 2023-03-31 ENCOUNTER — HOSPITAL ENCOUNTER (OUTPATIENT)
Facility: HOSPITAL | Age: 83
Setting detail: RECURRING SERIES
End: 2023-03-31
Payer: COMMERCIAL

## 2023-03-31 PROCEDURE — 97530 THERAPEUTIC ACTIVITIES: CPT

## 2023-03-31 PROCEDURE — 97012 MECHANICAL TRACTION THERAPY: CPT

## 2023-03-31 PROCEDURE — 97110 THERAPEUTIC EXERCISES: CPT

## 2023-03-31 NOTE — PROGRESS NOTES
PHYSICAL / OCCUPATIONAL THERAPY - DAILY TREATMENT NOTE (updated )    Patient Name: Lady Singh    Date: 3/31/2023    : 1940  Insurance: Payor: Emeli Key / Plan: [employer] HMO / Product Type: *No Product type* /      Patient  verified YES    Visit #   Current / Total 7 10   Time   In / Out 122 211   Pain   In / Out 6 5   Subjective Functional Status/Changes: Pt reports increased upper trap stiffness   Changes to:  Meds, Allergies, Med Hx, Sx Hx? If yes, update Summary List no       TREATMENT AREA =  Cervical    OBJECTIVE    Modalities Rationale:     decrease pain, increase tissue extensibility, and increase muscle contraction/control to improve patient's ability to progress to PLOF and address remaining functional goals. 15 min []  Mechanical Traction: type/lbs 15/5lbs 60\"on/20\" off  20 degree angle                  []  pro   []  sup   [x]  int   []  cont    []  before manual    []  after manual   Skin assessment post-treatment (if applicable):    [x]  intact    []  redness- no adverse reaction                 []redness - adverse reaction:        Therapeutic Procedures: Tx Min Billable or 1:1 Min (if diff from Tx Min) Procedure, Rationale, Specifics   26  43825 Therapeutic Exercise (timed):  increase ROM, strength, coordination, balance, and proprioception to improve patient's ability to progress to PLOF and address remaining functional goals. (see flow sheet as applicable)     Details if applicable:    See flowsheet     8  63211 Therapeutic Activity (timed):  use of dynamic activities replicating functional movements to increase ROM, strength, coordination, balance, and proprioception in order to improve patient's ability to progress to PLOF and address remaining functional goals.   (see flow sheet as applicable)     Details if applicable:    See flow sheet     29  [facility] Totals Reminder: bill using total billable min of TIMED therapeutic procedures (example: do not include dry needle or estim unattended, both untimed codes, in totals to left)  8-22 min = 1 unit; 23-37 min = 2 units; 38-52 min = 3 units; 53-67 min = 4 units; 68-82 min = 5 units   Total Total     [x]  Patient Education billed concurrently with other procedures   [x] Review HEP    [] Progressed/Changed HEP, detail:    [] Other detail:       Objective Information/Functional Measures/Assessment    Pt tolerated session well and demonstrated increased difficulty with attempting TRX flexion AAROM instead of supine flexion AAROM. Patient will continue to benefit from skilled PT / OT services to modify and progress therapeutic interventions, analyze and address functional mobility deficits, analyze and address ROM deficits, analyze and address strength deficits, analyze and address soft tissue restrictions, analyze and cue for proper movement patterns, analyze and modify for postural abnormalities, and instruct in home and community integration to address functional deficits and attain remaining goals.     Progress toward goals / Updated goals:  []  See Progress Note/Recertification    1st session since progress note, no notable progress yet    PLAN  yes Continue plan of care  []  Upgrade activities as tolerated  []  Discharge due to :  []  Other:    Yary Monae PT    3/31/2023    1:27 PM    Future Appointments   Date Time Provider Vivi Yip   3/31/2023  1:30 PM Yary Monae PT [facility]   4/4/2023  1:00 PM Dwayne Tyson, PT [facility]   4/5/2023  2:00 PM Kevin Zacarias PTA [facility]   4/11/2023  1:30 PM Kevin Zacarias, PTA [facility]   4/13/2023  3:00 PM Dwayne Tyson PT [facility]   4/17/2023  2:00 PM Yary Monae PT [facility]   4/19/2023  2:00 PM Yary Monae PT [facility]   4/24/2023  2:00 PM Dwayne Tyson, PT [facility]   4/26/2023  2:00 PM Dwayne Tyson, FELA [facility]   8/15/2023  2:00 PM Get Mcfarland MD [facility] BS AMB

## 2023-03-31 NOTE — PROGRESS NOTES
PHYSICAL / OCCUPATIONAL THERAPY - DAILY TREATMENT NOTE (updated )    Patient Name: Maximo Crook    Date: 3/31/2023    : 1940  Insurance: Payor: Rodolfo Sadler / Plan: [employer] HMO / Product Type: *No Product type* /      Patient  verified YES    Visit #   Current / Total *** ***   Time   In / Out *** ***   Pain   In / Out *** ***   Subjective Functional Status/Changes: ***   Changes to:  Meds, Allergies, Med Hx, Sx Hx? If yes, update Summary List {YES/NO DIET:775471799}       TREATMENT AREA =  No admission diagnoses are documented for this encounter. OBJECTIVE    {InMotion Modality XYFA:90045}    Therapeutic Procedures: Tx Min Billable or 1:1 Min (if diff from Tx Min) Procedure, Rationale, Specifics     30846 Therapeutic Exercise (timed):  increase ROM, strength, coordination, balance, and proprioception to improve patient's ability to progress to PLOF and address remaining functional goals. (see flow sheet as applicable)     Details if applicable:    See flowsheet       06614 Therapeutic Activity (timed):  use of dynamic activities replicating functional movements to increase ROM, strength, coordination, balance, and proprioception in order to improve patient's ability to progress to PLOF and address remaining functional goals. (see flow sheet as applicable)     Details if applicable:    See flow sheet       99603 Neuromuscular Re-Education (timed):  improve balance, coordination, kinesthetic sense, posture, core stability and proprioception to improve patient's ability to develop conscious control of individual muscles and awareness of position of extremities in order to progress to PLOF and address remaining functional goals. (see flow sheet as applicable)     Details if applicable:    See flowsheet     30579 Gait Training (timed):    *** feet with *** (assistive device) over *** surfaces with *** level of assist. Cuing for ***.   To improve safety and dynamic movement with household/community ambulation. (see flow sheet as applicable)     Details if applicable:       {InMotion Ther Procedures:96274}     Details if applicable:       [facility] Tot[facility] Reminder: bill using total billable min of TIMED therapeutic procedures (example: do not include dry needle or estim unattended, both untimed codes, in totals to left)  8-22 min = 1 unit; 23-37 min = 2 units; 38-52 min = 3 units; 53-67 min = 4 units; 68-82 min = 5 units   Total Total     [x]  Patient Education billed concurrently with other procedures   [x] Review HEP    [] Progressed/Changed HEP, detail:    [] Other detail:       Objective Information/Functional Measures/Assessment    Pt tolerated session *** and demonstrated *** apprehension during movements. Required *** cuing throughout session. Difficulty with ***. Improvement with ***    Patient will continue to benefit from skilled PT / OT services to modify and progress therapeutic interventions, analyze and address functional mobility deficits, analyze and address ROM deficits, analyze and address strength deficits, analyze and address soft tissue restrictions, analyze and cue for proper movement patterns, analyze and modify for postural abnormalities, and instruct in home and community integration to address functional deficits and attain remaining goals.     Progress toward goals / Updated goals:  []  See Progress Note/Recertification    ***    PLAN  {YES/NO DIET:861844339} Continue plan of care  []  Upgrade activities as tolerated  []  Discharge due to :  []  Other:    Shanna Estrada, PT    3/31/2023    12:38 PM    Future Appointments   Date Time Provider Vivi Yip   3/31/2023  1:30 PM Ambrocio Perez [facility]   4/5/2023  2:00 PM Maria Del Rosario Hernandez PTA [facility]   8/15/2023  2:00 PM Get Vasquez MD [facility] BS AMB

## 2023-04-04 RX ORDER — LOSARTAN POTASSIUM 100 MG/1
TABLET ORAL
Qty: 90 TABLET | Refills: 3 | Status: SHIPPED | OUTPATIENT
Start: 2023-04-04

## 2023-04-28 ENCOUNTER — TELEPHONE (OUTPATIENT)
Age: 83
End: 2023-04-28

## 2023-05-08 NOTE — TELEPHONE ENCOUNTER
Nay Guerin MD  You 10 days ago     SP  Stress test at [facility], low risk. No ischemia     Echo in our office with normal EF and mild-moderate MR. Will need repeat echo in 1 year        Called and spoke with patient. advise of test results.

## 2023-05-12 ENCOUNTER — TELEPHONE (OUTPATIENT)
Facility: CLINIC | Age: 83
End: 2023-05-12

## 2023-05-12 NOTE — TELEPHONE ENCOUNTER
Pt called states her rx for the Gabapentin is not working requesting that she please have something else sent to her pharmacy?

## 2023-05-12 NOTE — TELEPHONE ENCOUNTER
Called pt after reviewing PDMP. Prescribed gabapentin since February by Trever Mack. Recommended pt reach out to ortho for evaluation of increase in gabapentin vs trial of another agent as I have you to evaluate her pain since she established care. Pt amenable to plan.

## 2023-05-24 ENCOUNTER — OFFICE VISIT (OUTPATIENT)
Facility: CLINIC | Age: 83
End: 2023-05-24
Payer: COMMERCIAL

## 2023-05-24 VITALS
OXYGEN SATURATION: 97 % | TEMPERATURE: 97.5 F | HEART RATE: 73 BPM | SYSTOLIC BLOOD PRESSURE: 138 MMHG | BODY MASS INDEX: 27.75 KG/M2 | DIASTOLIC BLOOD PRESSURE: 70 MMHG | WEIGHT: 147 LBS | RESPIRATION RATE: 17 BRPM | HEIGHT: 61 IN

## 2023-05-24 DIAGNOSIS — Z01.818 PRE-OPERATIVE CLEARANCE: Primary | ICD-10-CM

## 2023-05-24 DIAGNOSIS — I10 PRIMARY HYPERTENSION: ICD-10-CM

## 2023-05-24 DIAGNOSIS — E11.42 TYPE 2 DIABETES MELLITUS WITH DIABETIC POLYNEUROPATHY, WITHOUT LONG-TERM CURRENT USE OF INSULIN (HCC): ICD-10-CM

## 2023-05-24 DIAGNOSIS — R82.90 ABNORMAL FINDING ON URINALYSIS: ICD-10-CM

## 2023-05-24 PROCEDURE — 3078F DIAST BP <80 MM HG: CPT | Performed by: STUDENT IN AN ORGANIZED HEALTH CARE EDUCATION/TRAINING PROGRAM

## 2023-05-24 PROCEDURE — 3075F SYST BP GE 130 - 139MM HG: CPT | Performed by: STUDENT IN AN ORGANIZED HEALTH CARE EDUCATION/TRAINING PROGRAM

## 2023-05-24 PROCEDURE — 3052F HG A1C>EQUAL 8.0%<EQUAL 9.0%: CPT | Performed by: STUDENT IN AN ORGANIZED HEALTH CARE EDUCATION/TRAINING PROGRAM

## 2023-05-24 PROCEDURE — 1123F ACP DISCUSS/DSCN MKR DOCD: CPT | Performed by: STUDENT IN AN ORGANIZED HEALTH CARE EDUCATION/TRAINING PROGRAM

## 2023-05-24 PROCEDURE — 99214 OFFICE O/P EST MOD 30 MIN: CPT | Performed by: STUDENT IN AN ORGANIZED HEALTH CARE EDUCATION/TRAINING PROGRAM

## 2023-05-24 ASSESSMENT — ENCOUNTER SYMPTOMS
ABDOMINAL PAIN: 0
BACK PAIN: 1
SHORTNESS OF BREATH: 0

## 2023-05-24 NOTE — PROGRESS NOTES
Diabetic foot exam  06/07/2023
normal.     * Normal stress and rest myocardial perfusion study without evidence of   inducible ischemia or scar. * Normal left ventricular function with calculated left ventricular ejection   fraction (LVEF) 82 %. * Stress Single Photon Emission Computed Tomography (SPECT) tomographic   images reveal homogeneous tracer uptake throughout the myocardium. Rest images   show no significant change. Impression:    Dlaila Cordova was seen today for pre-op exam.    Diagnoses and all orders for this visit:    Pre-operative clearance  -Pt does have the ability to perform > 4 MET levels of activity and has no active cardiac conditions. Pt may proceed with surgery with no additional cardiac testing or procedures. -Based on the [place] perioperative cardiac risk, patient's estimated risk probability for perioperative myocardial infarct or cardiac arrest is: 0.12 %     Type 2 diabetes mellitus with diabetic polyneuropathy, without long-term current use of insulin (HCC)  -POC A1c of 7.7 today, improved glycemic control  -continue current management  -DME for freestyle tierra 2, would benefit due to hx of problematic hypoglycemia  -     AMB POC HEMOGLOBIN A1C  -     DME Order for (Specify) as OP    Primary hypertension  -at goal today  -continue current management    Abnormal finding on urinalysis  -UA with glucose and leukocyte esterase. No bacteria or nitrite. Pt asymptomatic. In absence of symptoms, antibiotic treatment not indicated. I have discussed the diagnosis with the patient and the intended plan as seen in the above orders. The patient has received an after-visit summary and questions were answered concerning future plans. I have discussed medication side effects and warnings with the patient as well. I have reviewed the plan of care with the patient, accepted their input and they are in agreement with the treatment goals.      Follow-up and Dispositions    Return in about 3 months (around 8/24/2023) for T2DM

## 2023-06-08 ENCOUNTER — TELEPHONE (OUTPATIENT)
Facility: CLINIC | Age: 83
End: 2023-06-08

## 2023-06-08 NOTE — TELEPHONE ENCOUNTER
Pt called and stated she needs an appointment due to swelling in the throat and cant swallow. Called Pt back to get a better understanding of symptoms and reached VM. Advised via VM if it was effecting her breathing she needs to go to ER or urgent care but if not we will better access her tomorrow 6/9/23 at her visit with DR. Luna Schultz

## 2023-06-29 ENCOUNTER — OFFICE VISIT (OUTPATIENT)
Facility: CLINIC | Age: 83
End: 2023-06-29
Payer: COMMERCIAL

## 2023-06-29 VITALS
HEART RATE: 85 BPM | OXYGEN SATURATION: 98 % | TEMPERATURE: 98.7 F | BODY MASS INDEX: 25.49 KG/M2 | RESPIRATION RATE: 17 BRPM | SYSTOLIC BLOOD PRESSURE: 130 MMHG | DIASTOLIC BLOOD PRESSURE: 78 MMHG | HEIGHT: 61 IN | WEIGHT: 135 LBS

## 2023-06-29 DIAGNOSIS — R19.7 DIARRHEA, UNSPECIFIED TYPE: ICD-10-CM

## 2023-06-29 DIAGNOSIS — R30.0 DYSURIA: ICD-10-CM

## 2023-06-29 DIAGNOSIS — R13.10 DYSPHAGIA, UNSPECIFIED TYPE: ICD-10-CM

## 2023-06-29 DIAGNOSIS — Z09 HOSPITAL DISCHARGE FOLLOW-UP: Primary | ICD-10-CM

## 2023-06-29 PROCEDURE — 1111F DSCHRG MED/CURRENT MED MERGE: CPT | Performed by: STUDENT IN AN ORGANIZED HEALTH CARE EDUCATION/TRAINING PROGRAM

## 2023-06-29 PROCEDURE — 3075F SYST BP GE 130 - 139MM HG: CPT | Performed by: STUDENT IN AN ORGANIZED HEALTH CARE EDUCATION/TRAINING PROGRAM

## 2023-06-29 PROCEDURE — 3078F DIAST BP <80 MM HG: CPT | Performed by: STUDENT IN AN ORGANIZED HEALTH CARE EDUCATION/TRAINING PROGRAM

## 2023-06-29 PROCEDURE — 99214 OFFICE O/P EST MOD 30 MIN: CPT | Performed by: STUDENT IN AN ORGANIZED HEALTH CARE EDUCATION/TRAINING PROGRAM

## 2023-06-29 PROCEDURE — 1123F ACP DISCUSS/DSCN MKR DOCD: CPT | Performed by: STUDENT IN AN ORGANIZED HEALTH CARE EDUCATION/TRAINING PROGRAM

## 2023-06-29 ASSESSMENT — PATIENT HEALTH QUESTIONNAIRE - PHQ9
2. FEELING DOWN, DEPRESSED OR HOPELESS: 0
SUM OF ALL RESPONSES TO PHQ QUESTIONS 1-9: 0
SUM OF ALL RESPONSES TO PHQ9 QUESTIONS 1 & 2: 0
SUM OF ALL RESPONSES TO PHQ QUESTIONS 1-9: 0
SUM OF ALL RESPONSES TO PHQ QUESTIONS 1-9: 0
1. LITTLE INTEREST OR PLEASURE IN DOING THINGS: 0
SUM OF ALL RESPONSES TO PHQ QUESTIONS 1-9: 0

## 2023-07-02 LAB — BACTERIA UR CULT: ABNORMAL

## 2023-07-04 LAB — BACTERIA UR CULT: ABNORMAL

## 2023-07-05 ENCOUNTER — TELEPHONE (OUTPATIENT)
Facility: CLINIC | Age: 83
End: 2023-07-05

## 2023-07-05 DIAGNOSIS — N30.00 ACUTE CYSTITIS WITHOUT HEMATURIA: Primary | ICD-10-CM

## 2023-07-05 RX ORDER — NITROFURANTOIN 25; 75 MG/1; MG/1
100 CAPSULE ORAL 2 TIMES DAILY
Qty: 10 CAPSULE | Refills: 0 | Status: SHIPPED | OUTPATIENT
Start: 2023-07-05 | End: 2023-07-10

## 2023-07-10 RX ORDER — OXYBUTYNIN CHLORIDE 5 MG/1
5 TABLET ORAL DAILY
Qty: 90 TABLET | Refills: 3 | Status: SHIPPED | OUTPATIENT
Start: 2023-07-10

## 2023-07-10 RX ORDER — SIMVASTATIN 10 MG
10 TABLET ORAL NIGHTLY
Qty: 90 TABLET | Refills: 3 | Status: SHIPPED | OUTPATIENT
Start: 2023-07-10

## 2023-07-10 NOTE — TELEPHONE ENCOUNTER
This patient contacted the office for the following prescriptions to be refilled:    Medication requested :   Requested Prescriptions      No prescriptions requested or ordered in this encounter      PCP: Will Donohue MD  LOV:           06/29/23 an IN OFFICE  NOV DMA: 8/24/2023  FUTURE APPT:   Future Appointments   Date Time Provider [address]   8/15/2023  2:00 PM Jen Beatty MD [facility] BS AMB   8/24/2023  2:00 PM Will Donohue MD [facility]         Thank you.

## 2023-08-08 ENCOUNTER — CLINICAL DOCUMENTATION (OUTPATIENT)
Facility: CLINIC | Age: 83
End: 2023-08-08

## 2023-08-09 NOTE — PROGRESS NOTES
On call provider called patient back with concern of elevated BP. She is accompanied by boyfriend who is assisting to check BP with home monitor. She reports waxing and waning headaches, no changes in vision, no nausea, no chest pain. Home BP was 624/ 78 systolic. BP was checked twice while patient was seated. Repeat BP was 230/76. She reports taking her BP medications today as prescribed. Boyfriend was asked to call 911 for medical transport to hospital for patient. Patient and boyfriend verbalized agreement and will go to hospital via EMS.

## 2023-08-11 ENCOUNTER — TELEPHONE (OUTPATIENT)
Facility: CLINIC | Age: 83
End: 2023-08-11

## 2023-08-11 NOTE — TELEPHONE ENCOUNTER
----- Message from Leonie Elise sent at 8/11/2023  2:22 PM EDT -----  Subject: Message to Provider    QUESTIONS  Information for Provider? Dr. Will Richmond dentist was inquiring the office to   please send confirmation to whether or not the patient is diabetic fax to   [phone]  ---------------------------------------------------------------------------  --------------  [address]  [phone]; OK to leave message on voicemail  ---------------------------------------------------------------------------  --------------  SCRIPT ANSWERS  Relationship to Patient?  Self

## 2023-08-14 ENCOUNTER — TELEPHONE (OUTPATIENT)
Facility: CLINIC | Age: 83
End: 2023-08-14

## 2023-08-14 NOTE — TELEPHONE ENCOUNTER
----- Message from Margi Vallecillo sent at 8/11/2023  2:22 PM EDT -----  Subject: Message to Provider    QUESTIONS  Information for Provider? Dr. Wili Sutton dentist was inquiring the office to   please send confirmation to whether or not the patient is diabetic fax to   [phone]  ---------------------------------------------------------------------------  --------------  Steffanie Ferney Kristine  [phone]; OK to leave message on voicemail  ---------------------------------------------------------------------------  --------------  SCRIPT ANSWERS  Relationship to Patient?  Self

## 2023-08-15 ENCOUNTER — OFFICE VISIT (OUTPATIENT)
Age: 83
End: 2023-08-15
Payer: COMMERCIAL

## 2023-08-15 VITALS
SYSTOLIC BLOOD PRESSURE: 138 MMHG | DIASTOLIC BLOOD PRESSURE: 68 MMHG | BODY MASS INDEX: 24.37 KG/M2 | OXYGEN SATURATION: 100 % | HEART RATE: 84 BPM | WEIGHT: 129 LBS

## 2023-08-15 DIAGNOSIS — R07.9 CHEST PAIN, UNSPECIFIED TYPE: ICD-10-CM

## 2023-08-15 DIAGNOSIS — E78.00 PURE HYPERCHOLESTEROLEMIA: ICD-10-CM

## 2023-08-15 DIAGNOSIS — I10 ESSENTIAL HYPERTENSION WITH GOAL BLOOD PRESSURE LESS THAN 140/90: Primary | ICD-10-CM

## 2023-08-15 PROCEDURE — 3078F DIAST BP <80 MM HG: CPT | Performed by: INTERNAL MEDICINE

## 2023-08-15 PROCEDURE — 3075F SYST BP GE 130 - 139MM HG: CPT | Performed by: INTERNAL MEDICINE

## 2023-08-15 PROCEDURE — 99214 OFFICE O/P EST MOD 30 MIN: CPT | Performed by: INTERNAL MEDICINE

## 2023-08-15 PROCEDURE — 1123F ACP DISCUSS/DSCN MKR DOCD: CPT | Performed by: INTERNAL MEDICINE

## 2023-08-15 NOTE — PROGRESS NOTES
Identified pt with two pt identifiers(name and ). Reviewed record in preparation for visit and have obtained necessary documentation. Rosalino Redmond presents today for   Chief Complaint   Patient presents with    Follow-up     Cardiac testing       Pt c/o DIZZINESS, CHEST PAIN/ PRESSURE, SWELLING. Rosalino Redmond preferred language for health care discussion is english/other. Personal Protective Equipment:   Personal Protective Equipment was used including: mask-surgical and hands-gloves. Patient was placed on no precaution(s). Patient was not masked. Precautions:   Patient currently on None  Patient currently roomed with door closed. Is someone accompanying this pt? no    Is the patient using any DME equipment during [address]? no    Depression Screening:  PHQ-9 Questionaire 2023   Little interest or pleasure in doing things 0 1 0 1 0 0 0   Feeling down, depressed, or hopeless 0 1 0 1 0 0 0   Trouble falling or staying asleep, or sleeping too much - - - 0 - 1 -   Feeling tired or having little energy - - - 1 - 1 -   Poor appetite or overeating - - - 1 - 0 -   Feeling bad about yourself - or that you are a failure or have let yourself or your family down - - - 1 - 1 -   Trouble concentrating on things, such as reading the newspaper or watching television - - - 0 - 1 -   Moving or speaking so slowly that other people could have noticed. Or the opposite - being so fidgety or restless that you have been moving around a lot more than usual - - - 0 - 0 -   PHQ-9 Total Score 0 2 0 5 0 4 0        Learning Assessment:  No question data found. Abuse Screening:  Completed      Fall Risk  Completed      Pt currently taking Anticoagulant therapy? no  Pt currently taking Antiplatelet therapy?aspirin 81 mg tab daily     Coordination of Care:  1. Have you been to the ER, urgent care clinic since your last visit?  Hospitalized since your
note: This document has been produced using voice recognition software. Unrecognized errors in transcription may be present.

## 2023-08-18 ENCOUNTER — TELEPHONE (OUTPATIENT)
Facility: CLINIC | Age: 83
End: 2023-08-18

## 2023-08-18 NOTE — TELEPHONE ENCOUNTER
Pt called states she needs a refill for the Farxiga 5mg sent to her pharmacy([facility]) but she was last instructed by provider to take twice a day instead of once a day. Pt request a new rx be sent to the pharmacy to reflect the change.

## 2023-08-24 ENCOUNTER — OFFICE VISIT (OUTPATIENT)
Facility: CLINIC | Age: 83
End: 2023-08-24
Payer: COMMERCIAL

## 2023-08-24 VITALS
WEIGHT: 134 LBS | DIASTOLIC BLOOD PRESSURE: 73 MMHG | BODY MASS INDEX: 25.3 KG/M2 | TEMPERATURE: 98 F | OXYGEN SATURATION: 97 % | HEART RATE: 76 BPM | SYSTOLIC BLOOD PRESSURE: 136 MMHG | HEIGHT: 61 IN | RESPIRATION RATE: 17 BRPM

## 2023-08-24 DIAGNOSIS — I10 PRIMARY HYPERTENSION: ICD-10-CM

## 2023-08-24 DIAGNOSIS — E11.42 TYPE 2 DIABETES MELLITUS WITH DIABETIC POLYNEUROPATHY, WITHOUT LONG-TERM CURRENT USE OF INSULIN (HCC): Primary | ICD-10-CM

## 2023-08-24 DIAGNOSIS — N39.0 RECURRENT UTI: ICD-10-CM

## 2023-08-24 PROCEDURE — 3078F DIAST BP <80 MM HG: CPT | Performed by: STUDENT IN AN ORGANIZED HEALTH CARE EDUCATION/TRAINING PROGRAM

## 2023-08-24 PROCEDURE — 99214 OFFICE O/P EST MOD 30 MIN: CPT | Performed by: STUDENT IN AN ORGANIZED HEALTH CARE EDUCATION/TRAINING PROGRAM

## 2023-08-24 PROCEDURE — 3052F HG A1C>EQUAL 8.0%<EQUAL 9.0%: CPT | Performed by: STUDENT IN AN ORGANIZED HEALTH CARE EDUCATION/TRAINING PROGRAM

## 2023-08-24 PROCEDURE — 3075F SYST BP GE 130 - 139MM HG: CPT | Performed by: STUDENT IN AN ORGANIZED HEALTH CARE EDUCATION/TRAINING PROGRAM

## 2023-08-24 PROCEDURE — 1123F ACP DISCUSS/DSCN MKR DOCD: CPT | Performed by: STUDENT IN AN ORGANIZED HEALTH CARE EDUCATION/TRAINING PROGRAM

## 2023-08-24 ASSESSMENT — ENCOUNTER SYMPTOMS
SHORTNESS OF BREATH: 0
ABDOMINAL PAIN: 0

## 2023-09-05 RX ORDER — CALCIUM CITRATE/VITAMIN D3 200MG-6.25
TABLET ORAL
Qty: 100 STRIP | Refills: 2 | Status: SHIPPED | OUTPATIENT
Start: 2023-09-05

## 2023-09-05 RX ORDER — ATENOLOL 50 MG/1
TABLET ORAL
Qty: 90 TABLET | Refills: 2 | Status: SHIPPED | OUTPATIENT
Start: 2023-09-05

## 2023-09-12 NOTE — TELEPHONE ENCOUNTER
Mikaela French with [address] [place] [address] called to request medication refill for Metformin 500 mg. Pharmacy:  [facility]   [address] [address]   Phone:  [phone]    Fax:  [phone]    Please assist.  Thank you.

## 2023-10-25 ENCOUNTER — OFFICE VISIT (OUTPATIENT)
Facility: CLINIC | Age: 83
End: 2023-10-25
Payer: COMMERCIAL

## 2023-10-25 VITALS
BODY MASS INDEX: 25.11 KG/M2 | DIASTOLIC BLOOD PRESSURE: 68 MMHG | TEMPERATURE: 98.4 F | RESPIRATION RATE: 17 BRPM | SYSTOLIC BLOOD PRESSURE: 137 MMHG | WEIGHT: 133 LBS | HEIGHT: 61 IN | OXYGEN SATURATION: 97 % | HEART RATE: 70 BPM

## 2023-10-25 DIAGNOSIS — I10 PRIMARY HYPERTENSION: ICD-10-CM

## 2023-10-25 DIAGNOSIS — E11.42 TYPE 2 DIABETES MELLITUS WITH DIABETIC POLYNEUROPATHY, WITHOUT LONG-TERM CURRENT USE OF INSULIN (HCC): ICD-10-CM

## 2023-10-25 DIAGNOSIS — Z00.00 MEDICARE ANNUAL WELLNESS VISIT, SUBSEQUENT: Primary | ICD-10-CM

## 2023-10-25 DIAGNOSIS — R13.10 DYSPHAGIA, UNSPECIFIED TYPE: ICD-10-CM

## 2023-10-25 DIAGNOSIS — Z71.89 ACP (ADVANCE CARE PLANNING): ICD-10-CM

## 2023-10-25 PROCEDURE — G0439 PPPS, SUBSEQ VISIT: HCPCS | Performed by: STUDENT IN AN ORGANIZED HEALTH CARE EDUCATION/TRAINING PROGRAM

## 2023-10-25 PROCEDURE — 1123F ACP DISCUSS/DSCN MKR DOCD: CPT | Performed by: STUDENT IN AN ORGANIZED HEALTH CARE EDUCATION/TRAINING PROGRAM

## 2023-10-25 PROCEDURE — 99214 OFFICE O/P EST MOD 30 MIN: CPT | Performed by: STUDENT IN AN ORGANIZED HEALTH CARE EDUCATION/TRAINING PROGRAM

## 2023-10-25 PROCEDURE — 3044F HG A1C LEVEL LT 7.0%: CPT | Performed by: STUDENT IN AN ORGANIZED HEALTH CARE EDUCATION/TRAINING PROGRAM

## 2023-10-25 PROCEDURE — 3075F SYST BP GE 130 - 139MM HG: CPT | Performed by: STUDENT IN AN ORGANIZED HEALTH CARE EDUCATION/TRAINING PROGRAM

## 2023-10-25 PROCEDURE — 99497 ADVNCD CARE PLAN 30 MIN: CPT | Performed by: STUDENT IN AN ORGANIZED HEALTH CARE EDUCATION/TRAINING PROGRAM

## 2023-10-25 PROCEDURE — 3078F DIAST BP <80 MM HG: CPT | Performed by: STUDENT IN AN ORGANIZED HEALTH CARE EDUCATION/TRAINING PROGRAM

## 2023-10-25 ASSESSMENT — PATIENT HEALTH QUESTIONNAIRE - PHQ9
4. FEELING TIRED OR HAVING LITTLE ENERGY: 0
SUM OF ALL RESPONSES TO PHQ QUESTIONS 1-9: 8
SUM OF ALL RESPONSES TO PHQ9 QUESTIONS 1 & 2: 4
5. POOR APPETITE OR OVEREATING: 2
6. FEELING BAD ABOUT YOURSELF - OR THAT YOU ARE A FAILURE OR HAVE LET YOURSELF OR YOUR FAMILY DOWN: 2
SUM OF ALL RESPONSES TO PHQ QUESTIONS 1-9: 8
8. MOVING OR SPEAKING SO SLOWLY THAT OTHER PEOPLE COULD HAVE NOTICED. OR THE OPPOSITE, BEING SO FIGETY OR RESTLESS THAT YOU HAVE BEEN MOVING AROUND A LOT MORE THAN USUAL: 0
9. THOUGHTS THAT YOU WOULD BE BETTER OFF DEAD, OR OF HURTING YOURSELF: 0
2. FEELING DOWN, DEPRESSED OR HOPELESS: 2
SUM OF ALL RESPONSES TO PHQ QUESTIONS 1-9: 8
1. LITTLE INTEREST OR PLEASURE IN DOING THINGS: 2
3. TROUBLE FALLING OR STAYING ASLEEP: 0
10. IF YOU CHECKED OFF ANY PROBLEMS, HOW DIFFICULT HAVE THESE PROBLEMS MADE IT FOR YOU TO DO YOUR WORK, TAKE CARE OF THINGS AT HOME, OR GET ALONG WITH OTHER PEOPLE: 1
7. TROUBLE CONCENTRATING ON THINGS, SUCH AS READING THE NEWSPAPER OR WATCHING TELEVISION: 0
SUM OF ALL RESPONSES TO PHQ QUESTIONS 1-9: 8

## 2023-10-25 ASSESSMENT — COLUMBIA-SUICIDE SEVERITY RATING SCALE - C-SSRS
3. HAVE YOU BEEN THINKING ABOUT HOW YOU MIGHT KILL YOURSELF?: NO
5. HAVE YOU STARTED TO WORK OUT OR WORKED OUT THE DETAILS OF HOW TO KILL YOURSELF? DO YOU INTEND TO CARRY OUT THIS PLAN?: NO
4. HAVE YOU HAD THESE THOUGHTS AND HAD SOME INTENTION OF ACTING ON THEM?: NO
7. DID THIS OCCUR IN THE LAST THREE MONTHS: NO

## 2023-10-25 ASSESSMENT — ENCOUNTER SYMPTOMS
BACK PAIN: 1
SHORTNESS OF BREATH: 0
TROUBLE SWALLOWING: 1
ABDOMINAL PAIN: 0

## 2023-10-25 ASSESSMENT — LIFESTYLE VARIABLES
HOW MANY STANDARD DRINKS CONTAINING ALCOHOL DO YOU HAVE ON A TYPICAL DAY: PATIENT DOES NOT DRINK
HOW OFTEN DO YOU HAVE A DRINK CONTAINING ALCOHOL: NEVER

## 2023-10-25 NOTE — PATIENT INSTRUCTIONS
Incorporated. Care instructions adapted under license by [facility] ([facility]). If you have questions about a medical condition or this instruction, always ask your healthcare professional. [address] any warranty or liability for your use of this information. Learning About Mindfulness for Stress  What are mindfulness and stress? Stress is your body's response to a hard situation. Your body can have a physical, emotional, or mental response. A lot of things can cause stress. You may feel stress when you go on a job interview, take a test, or run a race. This kind of short-term stress is normal and even useful. It can help you if you need to work hard or react quickly. Stress also can last a long time. Long-term stress is caused by stressful situations or events. Examples of long-term stress include long-term health problems, ongoing problems at work, and conflicts in your family. Long-term stress can harm your health. Mindfulness is a focus only on things happening in the present moment. It's a process of purposefully paying attention to and being aware of your surroundings, your emotions, your thoughts, and how your body feels. You are aware of these things, but you aren't judging these experiences as \"good\" or \"bad. \" Mindfulness can help you learn to calm your mind and body to help you cope with illness, pain, and stress. How does mindfulness help to relieve stress? Mindfulness can help quiet your mind and relax your body. Studies show that it can help some people sleep better, feel less anxious, and bring their blood pressure down. And it's been shown to help some people live and cope better with certain health problems like heart disease, depression, chronic pain, and cancer. How do you practice mindfulness? To be mindful is to pay attention, to be present, and to be accepting. Like any new skill or habit, being mindful can take practice.   When you're mindful, you do just

## 2023-10-26 ENCOUNTER — CLINICAL DOCUMENTATION (OUTPATIENT)
Dept: SPIRITUAL SERVICES | Age: 83
End: 2023-10-26

## 2023-11-02 ENCOUNTER — CLINICAL DOCUMENTATION (OUTPATIENT)
Dept: SPIRITUAL SERVICES | Age: 83
End: 2023-11-02

## 2023-11-09 ENCOUNTER — CARE COORDINATION (OUTPATIENT)
Facility: CLINIC | Age: 83
End: 2023-11-09
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
## 2023-11-10 ENCOUNTER — CLINICAL DOCUMENTATION (OUTPATIENT)
Dept: SPIRITUAL SERVICES | Age: 83
End: 2023-11-10

## 2023-11-14 ENCOUNTER — TELEPHONE (OUTPATIENT)
Facility: CLINIC | Age: 83
End: 2023-11-14

## 2023-11-14 NOTE — TELEPHONE ENCOUNTER
Patient would like to know if she had a Flu vaccine during her [address] on 10/25/23? Please call to advise. Thank you.

## 2023-11-16 ENCOUNTER — CARE COORDINATION (OUTPATIENT)
Facility: CLINIC | Age: 83
End: 2023-11-16

## 2023-11-16 NOTE — CARE COORDINATION
.Complex Case Management Deferred 90 days      Date/Time:  11/16/2023 8:36 AM    Method of communication with patient:phone    [address] ( [facility]) attempted contact the patient by telephone to perform Ambulatory Care Coordination.  Patient remains admiitted to [address] [facility] calls will be suspended for 90 days 2/16/2023

## 2023-12-05 ENCOUNTER — HOSPITAL ENCOUNTER (OUTPATIENT)
Facility: HOSPITAL | Age: 83
Setting detail: SPECIMEN
Discharge: HOME | End: 2023-12-08
Payer: COMMERCIAL

## 2023-12-05 ENCOUNTER — OFFICE VISIT (OUTPATIENT)
Facility: CLINIC | Age: 83
End: 2023-12-05
Payer: COMMERCIAL

## 2023-12-05 VITALS
HEIGHT: 61 IN | WEIGHT: 133 LBS | DIASTOLIC BLOOD PRESSURE: 72 MMHG | SYSTOLIC BLOOD PRESSURE: 117 MMHG | RESPIRATION RATE: 17 BRPM | BODY MASS INDEX: 25.11 KG/M2 | HEART RATE: 81 BPM | OXYGEN SATURATION: 96 %

## 2023-12-05 DIAGNOSIS — I10 PRIMARY HYPERTENSION: ICD-10-CM

## 2023-12-05 DIAGNOSIS — S20.211S: Primary | ICD-10-CM

## 2023-12-05 DIAGNOSIS — R30.0 DYSURIA: ICD-10-CM

## 2023-12-05 DIAGNOSIS — S22.49XS CLOSED FRACTURE OF MULTIPLE RIBS, UNSPECIFIED LATERALITY, SEQUELA: ICD-10-CM
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
## 2023-12-05 DIAGNOSIS — J94.2 HEMOPNEUMOTHORAX ON RIGHT: ICD-10-CM

## 2023-12-05 LAB
APPEARANCE UR: CLEAR
BACTERIA URNS QL MICRO: ABNORMAL /HPF
BILIRUB UR QL: NEGATIVE
COLOR UR: YELLOW
EPITH CASTS URNS QL MICRO: ABNORMAL /LPF (ref 0–5)
GLUCOSE UR STRIP.AUTO-MCNC: >1000 MG/DL
HGB UR QL STRIP: NEGATIVE
KETONES UR QL STRIP.AUTO: NEGATIVE MG/DL
LEUKOCYTE ESTERASE UR QL STRIP.AUTO: ABNORMAL
NITRITE UR QL STRIP.AUTO: NEGATIVE
PH UR STRIP: 5.5 (ref 5–8)
PROT UR STRIP-MCNC: NEGATIVE MG/DL
RBC #/AREA URNS HPF: ABNORMAL /HPF (ref 0–5)
SP GR UR REFRACTOMETRY: 1.02 (ref 1–1.03)
UROBILINOGEN UR QL STRIP.AUTO: 0.2 EU/DL (ref 0.2–1)
WBC URNS QL MICRO: ABNORMAL /HPF (ref 0–4)

## 2023-12-05 PROCEDURE — 87086 URINE CULTURE/COLONY COUNT: CPT

## 2023-12-05 PROCEDURE — 99214 OFFICE O/P EST MOD 30 MIN: CPT | Performed by: STUDENT IN AN ORGANIZED HEALTH CARE EDUCATION/TRAINING PROGRAM

## 2023-12-05 PROCEDURE — 1123F ACP DISCUSS/DSCN MKR DOCD: CPT | Performed by: STUDENT IN AN ORGANIZED HEALTH CARE EDUCATION/TRAINING PROGRAM

## 2023-12-05 PROCEDURE — 3078F DIAST BP <80 MM HG: CPT | Performed by: STUDENT IN AN ORGANIZED HEALTH CARE EDUCATION/TRAINING PROGRAM

## 2023-12-05 PROCEDURE — 3074F SYST BP LT 130 MM HG: CPT | Performed by: STUDENT IN AN ORGANIZED HEALTH CARE EDUCATION/TRAINING PROGRAM

## 2023-12-05 PROCEDURE — 81001 URINALYSIS AUTO W/SCOPE: CPT

## 2023-12-05 RX ORDER — TRAMADOL HYDROCHLORIDE 50 MG/1
50 TABLET ORAL EVERY 8 HOURS PRN
Qty: 15 TABLET | Refills: 0 | Status: SHIPPED | OUTPATIENT
Start: 2023-12-05 | End: 2023-12-10

## 2023-12-05 NOTE — PROGRESS NOTES
Rosalino Redmond is a 82 y.o. presents today for   Chief Complaint   Patient presents with    Follow-up     Pt here to follow up with provider        Is someone accompanying this pt? no    Is the patient using any DME equipment during OV? no    Depression Screening:       10/25/2023     2:50 PM 2023     1:25 PM 2023     2:39 PM 2022     3:01 PM 2022     1:46 PM 2022     2:03 PM 2022     1:37 PM   PHQ-9 Questionaire   Little interest or pleasure in doing things 2 0 1 0 1 0 0   Feeling down, depressed, or hopeless 2 0 1 0 1 0 0   Trouble falling or staying asleep, or sleeping too much 0    0  1   Feeling tired or having little energy 0    1  1   Poor appetite or overeating 2    1  0   Feeling bad about yourself - or that you are a failure or have let yourself or your family down 2    1  1   Trouble concentrating on things, such as reading the newspaper or watching television 0    0  1   Moving or speaking so slowly that other people could have noticed. Or the opposite - being so fidgety or restless that you have been moving around a lot more than usual 0    0  0   Thoughts that you would be better off dead, or of hurting yourself in some way 0         PHQ-9 Total Score 8 0 2 0 5 0 4   If you checked off any problems, how difficult have these problems made it for you to do your work, take care of things at home, or get along with other people? 1             Abuse Screenin/15/2023     2:00 PM   AMB Abuse Screening   Do you ever feel afraid of your partner? N   Are you in a relationship with someone who physically or mentally threatens you? N   Is it safe for you to go home? Y       Learning Assessment:  No question data found. Fall Risk:      10/25/2023     2:50 PM 8/15/2023     2:04 PM   Fall Risk   2 or more falls in past year? yes no   Fall with injury in past year? yes no           Coordination of Care:   1.  \"Have you been to the ER, urgent care clinic since your last
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
## 2023-12-06 ASSESSMENT — ENCOUNTER SYMPTOMS
ABDOMINAL PAIN: 0
SHORTNESS OF BREATH: 0

## 2023-12-07 LAB
BACTERIA SPEC CULT: NORMAL
CC UR VC: NORMAL
SERVICE CMNT-IMP: NORMAL

## 2023-12-18 ENCOUNTER — TELEPHONE (OUTPATIENT)
Facility: CLINIC | Age: 83
End: 2023-12-18

## 2023-12-18 NOTE — TELEPHONE ENCOUNTER
Patient is calling for an order ultrasound for her neck area.  Please order to [facility].      Please advise    Thank you

## 2024-01-03 ENCOUNTER — TELEMEDICINE (OUTPATIENT)
Facility: CLINIC | Age: 84
End: 2024-01-03
Payer: COMMERCIAL

## 2024-01-03 DIAGNOSIS — S20.211S: ICD-10-CM

## 2024-01-03 DIAGNOSIS — S22.49XS CLOSED FRACTURE OF MULTIPLE RIBS, UNSPECIFIED LATERALITY, SEQUELA: ICD-10-CM
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
## 2024-01-03 DIAGNOSIS — J94.2 HEMOPNEUMOTHORAX ON RIGHT: ICD-10-CM

## 2024-01-03 PROCEDURE — 1123F ACP DISCUSS/DSCN MKR DOCD: CPT | Performed by: STUDENT IN AN ORGANIZED HEALTH CARE EDUCATION/TRAINING PROGRAM

## 2024-01-03 PROCEDURE — 99214 OFFICE O/P EST MOD 30 MIN: CPT | Performed by: STUDENT IN AN ORGANIZED HEALTH CARE EDUCATION/TRAINING PROGRAM

## 2024-01-03 ASSESSMENT — ENCOUNTER SYMPTOMS
ABDOMINAL PAIN: 0
BACK PAIN: 1
SHORTNESS OF BREATH: 0
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
## 2024-01-08 RX ORDER — LEVOTHYROXINE SODIUM 0.05 MG/1
50 TABLET ORAL
Qty: 90 TABLET | Refills: 1 | Status: SHIPPED | OUTPATIENT
Start: 2024-01-08

## 2024-01-15 RX ORDER — AMLODIPINE BESYLATE 5 MG/1
5 TABLET ORAL DAILY
Qty: 90 TABLET | Refills: 3 | Status: SHIPPED | OUTPATIENT
Start: 2024-01-15

## 2024-01-15 NOTE — TELEPHONE ENCOUNTER
PCP: Elida Corona MD    Last appt:  8/15/2023   Future Appointments   Date Time Provider Department Center   1/25/2024  2:00 PM Elida Corona MD [facility] BS AMB   8/15/2024  1:15 PM Get Ortiz MD [facility] BS AMB       Requested Prescriptions     Pending Prescriptions Disp Refills    amLODIPine (NORVASC) 5 MG tablet [Pharmacy Med Name: AMLODIPINE BESYLATE 5 MG Tablet] 90 tablet 3     Sig: TAKE 1 TABLET EVERY DAY

## 2024-01-19 RX ORDER — LEVOTHYROXINE SODIUM 0.05 MG/1
50 TABLET ORAL
Qty: 90 TABLET | Refills: 1 | Status: SHIPPED | OUTPATIENT
Start: 2024-01-19

## 2024-01-19 RX ORDER — SIMVASTATIN 10 MG
10 TABLET ORAL NIGHTLY
Qty: 90 TABLET | Refills: 1 | Status: SHIPPED | OUTPATIENT
Start: 2024-01-19

## 2024-01-19 RX ORDER — ATENOLOL 50 MG/1
50 TABLET ORAL DAILY
Qty: 90 TABLET | Refills: 1 | Status: SHIPPED | OUTPATIENT
Start: 2024-01-19

## 2024-01-19 RX ORDER — LOSARTAN POTASSIUM 100 MG/1
100 TABLET ORAL DAILY
Qty: 90 TABLET | Refills: 1 | Status: SHIPPED | OUTPATIENT
Start: 2024-01-19

## 2024-01-24 NOTE — TELEPHONE ENCOUNTER
PCP: Elida Corona MD    Last appt:  8/15/2023   Future Appointments   Date Time Provider Department Center   1/25/2024  2:00 PM Elida Corona MD [facility] BS AMB   8/15/2024  1:15 PM Get Ortiz MD [facility] BS AMB       Requested Prescriptions     Pending Prescriptions Disp Refills    amLODIPine (NORVASC) 5 MG tablet 90 tablet 2     Sig: Take 1 tablet by mouth daily

## 2024-01-25 RX ORDER — AMLODIPINE BESYLATE 5 MG/1
5 TABLET ORAL DAILY
Qty: 90 TABLET | Refills: 2 | Status: SHIPPED | OUTPATIENT
Start: 2024-01-25

## 2024-02-22 RX ORDER — DAPAGLIFLOZIN 10 MG/1
10 TABLET, FILM COATED ORAL DAILY
Qty: 90 TABLET | Refills: 1 | Status: SHIPPED | OUTPATIENT
Start: 2024-02-22

## 2024-02-22 NOTE — TELEPHONE ENCOUNTER
Patient is requesting a medication refill for the following medication     dapagliflozin (FARXIGA) 10 MG tablet     LOV: 01/03/24    FOV: 03/06/24        Please assist, Thank you.

## 2024-03-25 RX ORDER — LEVOTHYROXINE SODIUM 0.05 MG/1
50 TABLET ORAL
Qty: 90 TABLET | Refills: 1 | Status: SHIPPED | OUTPATIENT
Start: 2024-03-25

## 2024-03-25 NOTE — TELEPHONE ENCOUNTER
Medication(s) requesting:   Requested Prescriptions      No prescriptions requested or ordered in this encounter       Last office visit:  12/05/23  Next office visit DMA: Visit date not found

## 2024-03-26 RX ORDER — OSPEMIFENE 60 MG/1
1 TABLET, FILM COATED ORAL DAILY
Qty: 90 TABLET | Refills: 1 | Status: SHIPPED | OUTPATIENT
Start: 2024-03-26

## 2024-03-26 RX ORDER — DAPAGLIFLOZIN 10 MG/1
10 TABLET, FILM COATED ORAL DAILY
Qty: 90 TABLET | Refills: 1 | Status: SHIPPED | OUTPATIENT
Start: 2024-03-26

## 2024-03-26 NOTE — TELEPHONE ENCOUNTER
----- Message from Nancy Littlejohn sent at 3/26/2024  2:38 PM EDT -----  Subject: Refill Request    QUESTIONS  Name of Medication? dapagliflozin (FARXIGA) 10 MG tablet  Patient-reported dosage and instructions? 10mg tablet take one tablet by   mouth QD   How many days do you have left? 0  Preferred Pharmacy? [facility]  Pharmacy phone number (if available)? [phone]  Additional Information for Provider? patient is out of her medication   ---------------------------------------------------------------------------  --------------,  Name of Medication? metFORMIN (GLUCOPHAGE) 500 MG tablet  Patient-reported dosage and instructions? 500 mg tablet. Take one tablet   by mouth BID   How many days do you have left? 1  Preferred Pharmacy? [facility]  Pharmacy phone number (if available)? [phone]  ---------------------------------------------------------------------------  --------------,  Name of Medication? Other - Osphena   Patient-reported dosage and instructions? 60mg tablet. Take one tablet by   mouth QD   How many days do you have left? 0  Preferred Pharmacy? [facility]  Pharmacy phone number (if available)? [phone]  Additional Information for Provider? This medication is prescribed by her   OB-GYN   ---------------------------------------------------------------------------  --------------  CALL BACK INFO  What is the best way for the office to contact you? OK to leave message on   voicemail  Preferred Call Back Phone Number? [phone]  ---------------------------------------------------------------------------  --------------  SCRIPT ANSWERS  Relationship to Patient? Self

## 2024-03-27 RX ORDER — OSPEMIFENE 60 MG/1
1 TABLET, FILM COATED ORAL DAILY
Qty: 90 TABLET | Refills: 1 | OUTPATIENT
Start: 2024-03-27

## 2024-03-27 NOTE — TELEPHONE ENCOUNTER
Medication(s) requesting:   Requested Prescriptions     Pending Prescriptions Disp Refills    Ospemifene (OSPHENA) 60 MG TABS 90 tablet 1     Sig: Take 1 tablet by mouth daily       Last office visit:  12/05/23  Next office visit DMA: 4/9/2024

## 2024-04-15 ENCOUNTER — TELEPHONE (OUTPATIENT)
Facility: CLINIC | Age: 84
End: 2024-04-15

## 2024-04-15 NOTE — TELEPHONE ENCOUNTER
Janeen with [facility] called requesting a PA for medication that is expiring today 04/15/24    Ospemifene (OSPHENA) 60 MG TABS       Ph: [phone]        Please assist, Thank you.

## 2024-04-18 ENCOUNTER — OFFICE VISIT (OUTPATIENT)
Facility: CLINIC | Age: 84
End: 2024-04-18

## 2024-04-18 VITALS
SYSTOLIC BLOOD PRESSURE: 145 MMHG | OXYGEN SATURATION: 92 % | BODY MASS INDEX: 35.61 KG/M2 | RESPIRATION RATE: 15 BRPM | HEART RATE: 64 BPM | DIASTOLIC BLOOD PRESSURE: 68 MMHG | TEMPERATURE: 97.2 F | HEIGHT: 61 IN | WEIGHT: 188.6 LBS

## 2024-04-18 DIAGNOSIS — M81.0 AGE RELATED OSTEOPOROSIS, UNSPECIFIED PATHOLOGICAL FRACTURE PRESENCE: ICD-10-CM

## 2024-04-18 DIAGNOSIS — Z13.220 SCREENING CHOLESTEROL LEVEL: ICD-10-CM

## 2024-04-18 DIAGNOSIS — E11.42 TYPE 2 DIABETES MELLITUS WITH DIABETIC POLYNEUROPATHY, WITHOUT LONG-TERM CURRENT USE OF INSULIN (HCC): ICD-10-CM

## 2024-04-18 DIAGNOSIS — I10 PRIMARY HYPERTENSION: Primary | ICD-10-CM

## 2024-04-18 DIAGNOSIS — F43.10 POST TRAUMATIC STRESS DISORDER: ICD-10-CM

## 2024-04-18 DIAGNOSIS — G89.29 OTHER CHRONIC PAIN: ICD-10-CM

## 2024-04-18 SDOH — ECONOMIC STABILITY: FOOD INSECURITY: WITHIN THE PAST 12 MONTHS, THE FOOD YOU BOUGHT JUST DIDN'T LAST AND YOU DIDN'T HAVE MONEY TO GET MORE.: NEVER TRUE

## 2024-04-18 SDOH — ECONOMIC STABILITY: INCOME INSECURITY: HOW HARD IS IT FOR YOU TO PAY FOR THE VERY BASICS LIKE FOOD, HOUSING, MEDICAL CARE, AND HEATING?: NOT HARD AT ALL

## 2024-04-18 SDOH — ECONOMIC STABILITY: FOOD INSECURITY: WITHIN THE PAST 12 MONTHS, YOU WORRIED THAT YOUR FOOD WOULD RUN OUT BEFORE YOU GOT MONEY TO BUY MORE.: NEVER TRUE

## 2024-04-18 ASSESSMENT — PATIENT HEALTH QUESTIONNAIRE - PHQ9
SUM OF ALL RESPONSES TO PHQ QUESTIONS 1-9: 11
8. MOVING OR SPEAKING SO SLOWLY THAT OTHER PEOPLE COULD HAVE NOTICED. OR THE OPPOSITE, BEING SO FIGETY OR RESTLESS THAT YOU HAVE BEEN MOVING AROUND A LOT MORE THAN USUAL: NOT AT ALL
1. LITTLE INTEREST OR PLEASURE IN DOING THINGS: NEARLY EVERY DAY
SUM OF ALL RESPONSES TO PHQ QUESTIONS 1-9: 11
SUM OF ALL RESPONSES TO PHQ9 QUESTIONS 1 & 2: 4
4. FEELING TIRED OR HAVING LITTLE ENERGY: NEARLY EVERY DAY
SUM OF ALL RESPONSES TO PHQ QUESTIONS 1-9: 11
10. IF YOU CHECKED OFF ANY PROBLEMS, HOW DIFFICULT HAVE THESE PROBLEMS MADE IT FOR YOU TO DO YOUR WORK, TAKE CARE OF THINGS AT HOME, OR GET ALONG WITH OTHER PEOPLE: NOT DIFFICULT AT ALL
6. FEELING BAD ABOUT YOURSELF - OR THAT YOU ARE A FAILURE OR HAVE LET YOURSELF OR YOUR FAMILY DOWN: SEVERAL DAYS
9. THOUGHTS THAT YOU WOULD BE BETTER OFF DEAD, OR OF HURTING YOURSELF: NOT AT ALL
2. FEELING DOWN, DEPRESSED OR HOPELESS: SEVERAL DAYS
5. POOR APPETITE OR OVEREATING: SEVERAL DAYS
SUM OF ALL RESPONSES TO PHQ QUESTIONS 1-9: 11
3. TROUBLE FALLING OR STAYING ASLEEP: SEVERAL DAYS
7. TROUBLE CONCENTRATING ON THINGS, SUCH AS READING THE NEWSPAPER OR WATCHING TELEVISION: SEVERAL DAYS

## 2024-04-18 NOTE — PROGRESS NOTES
Rona Carole Goldberg is a 83 y.o. year old female who presents today for   Chief Complaint   Patient presents with    Follow-up       Is someone accompanying this pt? no    Is the patient using any DME equipment during OV? no    Depression Screenin/18/2024     2:55 PM 10/25/2023     2:50 PM 2023     1:25 PM 2023     2:39 PM 2022     3:01 PM 2022     1:46 PM 2022     2:03 PM   PHQ-9 Questionaire   Little interest or pleasure in doing things 3 2 0 1 0 1 0   Feeling down, depressed, or hopeless 1 2 0 1 0 1 0   Trouble falling or staying asleep, or sleeping too much 1 0    0    Feeling tired or having little energy 3 0    1    Poor appetite or overeating 1 2    1    Feeling bad about yourself - or that you are a failure or have let yourself or your family down 1 2    1    Trouble concentrating on things, such as reading the newspaper or watching television 1 0    0    Moving or speaking so slowly that other people could have noticed. Or the opposite - being so fidgety or restless that you have been moving around a lot more than usual 0 0    0    Thoughts that you would be better off dead, or of hurting yourself in some way 0 0        PHQ-9 Total Score 11 8 0 2 0 5 0   If you checked off any problems, how difficult have these problems made it for you to do your work, take care of things at home, or get along with other people? 0 1            Abuse Screenin/15/2023     2:00 PM   AMB Abuse Screening   Do you ever feel afraid of your partner? N   Are you in a relationship with someone who physically or mentally threatens you? N   Is it safe for you to go home? Y       Learning Assessment:  No question data found.    Fall Risk:      10/25/2023     2:50 PM 8/15/2023     2:04 PM   Fall Risk   2 or more falls in past year? yes no   Fall with injury in past year? yes no           Coordination of Care:   1. \"Have you been to the ER, urgent care clinic since your last visit?  Hospitalized
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
## 2024-04-24 ENCOUNTER — TELEPHONE (OUTPATIENT)
Facility: CLINIC | Age: 84
End: 2024-04-24

## 2024-04-24 NOTE — TELEPHONE ENCOUNTER
Patient called to advise provider  that she has a cold, coughing up ramos and blowing her nose a lot, feels weak, no fever.    Would like a for provider to script to pharmacy,, or would like to get a virtual       Pharmacy would be     [facility]      Please advise    Thank you

## 2024-04-25 NOTE — TELEPHONE ENCOUNTER
Called patient and left her a voicemail to schedule a virtual appointment to talk to Dr. Corona about her symptoms. Dr. Corona wanted her to schedule a virtual for 1 p.m. today.

## 2024-04-26 ENCOUNTER — TELEPHONE (OUTPATIENT)
Facility: CLINIC | Age: 84
End: 2024-04-26

## 2024-04-26 RX ORDER — AZITHROMYCIN 250 MG/1
TABLET, FILM COATED ORAL
Qty: 6 TABLET | Refills: 0 | Status: SHIPPED | OUTPATIENT
Start: 2024-04-26 | End: 2024-05-06

## 2024-04-26 RX ORDER — GUAIFENESIN 200 MG/1
400 TABLET ORAL EVERY 8 HOURS PRN
Qty: 30 TABLET | Refills: 2 | Status: SHIPPED | OUTPATIENT
Start: 2024-04-26

## 2024-04-26 NOTE — TELEPHONE ENCOUNTER
Patient called to advise she has a very bad cold, and would like for provider to suggest something else.     Please advise    Thank you       precipitous delivery

## 2024-05-02 ASSESSMENT — ENCOUNTER SYMPTOMS
BACK PAIN: 1
SHORTNESS OF BREATH: 0

## 2024-05-10 ENCOUNTER — COMPREHENSIVE EXAM (OUTPATIENT)
Dept: URBAN - METROPOLITAN AREA CLINIC 1 | Facility: CLINIC | Age: 84
End: 2024-05-10

## 2024-05-10 DIAGNOSIS — H04.123: ICD-10-CM

## 2024-05-10 DIAGNOSIS — H40.023: ICD-10-CM

## 2024-05-10 DIAGNOSIS — E11.9: ICD-10-CM

## 2024-05-10 PROCEDURE — 92133 CPTRZD OPH DX IMG PST SGM ON: CPT

## 2024-05-10 PROCEDURE — 99214 OFFICE O/P EST MOD 30 MIN: CPT

## 2024-05-10 ASSESSMENT — TONOMETRY
OD_IOP_MMHG: 12
OS_IOP_MMHG: 12

## 2024-05-10 ASSESSMENT — VISUAL ACUITY
OS_SC: 20/40
OD_SC: 20/30

## 2024-05-13 ENCOUNTER — HOSPITAL ENCOUNTER (OUTPATIENT)
Facility: HOSPITAL | Age: 84
Discharge: HOME | End: 2024-05-16
Attending: STUDENT IN AN ORGANIZED HEALTH CARE EDUCATION/TRAINING PROGRAM
Payer: MEDICARE

## 2024-05-13 DIAGNOSIS — M81.0 AGE RELATED OSTEOPOROSIS, UNSPECIFIED PATHOLOGICAL FRACTURE PRESENCE: ICD-10-CM

## 2024-05-13 PROCEDURE — 77080 DXA BONE DENSITY AXIAL: CPT

## 2024-05-14 ENCOUNTER — NURSE ONLY (OUTPATIENT)
Facility: CLINIC | Age: 84
End: 2024-05-14

## 2024-05-14 DIAGNOSIS — E11.42 TYPE 2 DIABETES MELLITUS WITH DIABETIC POLYNEUROPATHY, WITHOUT LONG-TERM CURRENT USE OF INSULIN (HCC): ICD-10-CM

## 2024-05-14 DIAGNOSIS — Z13.220 SCREENING CHOLESTEROL LEVEL: ICD-10-CM

## 2024-05-14 DIAGNOSIS — I10 PRIMARY HYPERTENSION: ICD-10-CM

## 2024-05-15 LAB
A/G RATIO: 1.9 RATIO (ref 1.1–2.6)
ALBUMIN: 4.2 G/DL (ref 3.5–5)
ALP BLD-CCNC: 71 U/L (ref 40–120)
ALT SERPL-CCNC: 14 U/L (ref 5–40)
ANION GAP SERPL CALCULATED.3IONS-SCNC: 12 MMOL/L (ref 3–15)
AST SERPL-CCNC: 17 U/L (ref 10–37)
BASOPHILS ABSOLUTE: 0 K/UL (ref 0–0.2)
BASOPHILS RELATIVE PERCENT: 0 % (ref 0–2)
BILIRUB SERPL-MCNC: 0.2 MG/DL (ref 0.2–1.2)
BUN BLDV-MCNC: 10 MG/DL (ref 6–22)
CALCIUM SERPL-MCNC: 9.2 MG/DL (ref 8.4–10.5)
CHLORIDE BLD-SCNC: 95 MMOL/L (ref 98–110)
CHOLESTEROL, TOTAL: 134 MG/DL (ref 110–200)
CHOLESTEROL/HDL RATIO: 2.2 (ref 0–5)
CO2: 25 MMOL/L (ref 20–32)
CREAT SERPL-MCNC: 0.8 MG/DL (ref 0.8–1.4)
EOSINOPHIL # BLD: 2 % (ref 0–6)
EOSINOPHILS ABSOLUTE: 0.1 K/UL (ref 0–0.5)
ESTIMATED AVERAGE GLUCOSE: 156 MG/DL (ref 91–123)
GFR, ESTIMATED: >60 ML/MIN/1.73 SQ.M.
GLOBULIN: 2.2 G/DL (ref 2–4)
GLUCOSE: 199 MG/DL (ref 70–99)
HBA1C MFR BLD: 7.1 % (ref 4.8–5.6)
HCT VFR BLD CALC: 36.1 % (ref 35.1–48.3)
HDLC SERPL-MCNC: 62 MG/DL
HEMOGLOBIN: 11.1 G/DL (ref 11.7–16.1)
LDL CHOLESTEROL: 45 MG/DL (ref 50–99)
LDL/HDL RATIO: 0.7
LYMPHOCYTES # BLD: 21 % (ref 20–45)
LYMPHOCYTES ABSOLUTE: 0.8 K/UL (ref 1–4.8)
MCH RBC QN AUTO: 28 PG (ref 26–34)
MCHC RBC AUTO-ENTMCNC: 31 G/DL (ref 31–36)
MCV RBC AUTO: 91 FL (ref 80–99)
MONOCYTES ABSOLUTE: 0.4 K/UL (ref 0.1–1)
MONOCYTES: 10 % (ref 3–12)
NEUTROPHILS ABSOLUTE: 2.6 K/UL (ref 1.8–7.7)
NEUTROPHILS: 67 % (ref 40–75)
NON-HDL CHOLESTEROL: 72 MG/DL
PDW BLD-RTO: 15.1 % (ref 10–15.5)
PLATELET # BLD: 194 K/UL (ref 140–440)
PMV BLD AUTO: 11.5 FL (ref 9–13)
POTASSIUM SERPL-SCNC: 4 MMOL/L (ref 3.5–5.5)
RBC # BLD: 3.96 M/UL (ref 3.8–5.2)
SODIUM BLD-SCNC: 132 MMOL/L (ref 133–145)
TOTAL PROTEIN: 6.4 G/DL (ref 6.2–8.1)
TRIGL SERPL-MCNC: 131 MG/DL (ref 40–149)
VLDLC SERPL CALC-MCNC: 26 MG/DL (ref 8–30)
WBC # BLD: 3.9 K/UL (ref 4–11)

## 2024-05-22 ENCOUNTER — OFFICE VISIT (OUTPATIENT)
Age: 84
End: 2024-05-22
Payer: MEDICARE

## 2024-05-22 VITALS — BODY MASS INDEX: 26.24 KG/M2 | WEIGHT: 139 LBS | HEIGHT: 61 IN

## 2024-05-22 DIAGNOSIS — M17.12 PRIMARY OSTEOARTHRITIS OF LEFT KNEE: ICD-10-CM

## 2024-05-22 DIAGNOSIS — M25.562 CHRONIC PAIN OF LEFT KNEE: Primary | ICD-10-CM

## 2024-05-22 DIAGNOSIS — G89.29 CHRONIC PAIN OF LEFT KNEE: Primary | ICD-10-CM

## 2024-05-22 PROCEDURE — 99204 OFFICE O/P NEW MOD 45 MIN: CPT | Performed by: PHYSICIAN ASSISTANT

## 2024-05-22 PROCEDURE — 20611 DRAIN/INJ JOINT/BURSA W/US: CPT | Performed by: PHYSICIAN ASSISTANT

## 2024-05-22 PROCEDURE — 1123F ACP DISCUSS/DSCN MKR DOCD: CPT | Performed by: PHYSICIAN ASSISTANT

## 2024-05-22 PROCEDURE — 73564 X-RAY EXAM KNEE 4 OR MORE: CPT | Performed by: PHYSICIAN ASSISTANT

## 2024-05-22 RX ORDER — LIDOCAINE HYDROCHLORIDE 10 MG/ML
3 INJECTION, SOLUTION INFILTRATION; PERINEURAL ONCE
Status: COMPLETED | OUTPATIENT
Start: 2024-05-22 | End: 2024-05-22

## 2024-05-22 RX ORDER — BETAMETHASONE SODIUM PHOSPHATE AND BETAMETHASONE ACETATE 3; 3 MG/ML; MG/ML
6 INJECTION, SUSPENSION INTRA-ARTICULAR; INTRALESIONAL; INTRAMUSCULAR; SOFT TISSUE ONCE
Status: COMPLETED | OUTPATIENT
Start: 2024-05-22 | End: 2024-05-22

## 2024-05-22 RX ADMIN — BETAMETHASONE SODIUM PHOSPHATE AND BETAMETHASONE ACETATE 6 MG: 3; 3 INJECTION, SUSPENSION INTRA-ARTICULAR; INTRALESIONAL; INTRAMUSCULAR; SOFT TISSUE at 11:08

## 2024-05-22 RX ADMIN — LIDOCAINE HYDROCHLORIDE 3 ML: 10 INJECTION, SOLUTION INFILTRATION; PERINEURAL at 11:09

## 2024-05-22 NOTE — PROGRESS NOTES
articulation and narrowing of the medial joint line.  No acute abnormalities noted.    Assessment: Left knee advancing osteoarthritis, status post fall    Plan: At this point, we discussed treatment options.  We will move forward with a cortisone injection for the left knee today.  After informed consent, under aseptic conditions, with US guided assitance, the left knee was prepped with betadine and a mxiture of 3ml 1% lidocaine and 6mg of celestone was injected without complications.  The patient tolerated the injection well.  The patient is instructed on post-injection care.  Will see her back in a month's time for reevaluation to  efficacy of the injection.  We may consider an MRI of the left knee at that point.    Care plan outlined and precautions discussed. Radiographs and Results were reviewed with the patient.  Medications were reviewed with the patient. All of pt's questions and concerns were addressed.  Increasing symptoms and return precautions associated with chief complaint and evaluation were reviewed with the patient in detail.  The patient demonstrated adequate understanding.  Social determinents of health were discussed and did not alter treatment plan.          Chart reviewed for the following:  Chalino BASSETT PA-C, have reviewed the History, Physical and updated the Allergic reactions for Rona Carole Goldberg?    TIME OUT performed immediately prior to start of procedure:  Chalino BASSETT PA-C, have performed the following reviews on Rona Carole Goldberg prior to the start of the procedure:  ????????  * Patient was identified by name and date of birth   * Agreement on procedure being performed was verified  * Risks and Benefits explained to the patient  * Procedure site verified and marked as necessary  * Patient was positioned for comfort  * Consent was signed and verified    Time:11:00 AM    Body part: left knee, intra-articular    Medication & Dose: 3ml 1% lidocaine and 6mg

## 2024-06-07 RX ORDER — AMLODIPINE BESYLATE 5 MG/1
5 TABLET ORAL DAILY
Qty: 30 TABLET | Refills: 0 | Status: SHIPPED | OUTPATIENT
Start: 2024-06-07

## 2024-06-20 NOTE — TELEPHONE ENCOUNTER
Pt called to request med refill for:    Metformin 500 mg tablet    LOV:  4/18/2024    Future Appointments   Date Time Provider Department   6/25/2024  3:30 PM Elida Corona MD [facility]     Please assist. Thank you.

## 2024-06-21 ENCOUNTER — TELEPHONE (OUTPATIENT)
Facility: CLINIC | Age: 84
End: 2024-06-21

## 2024-06-21 NOTE — TELEPHONE ENCOUNTER
Pt called back stating she also needs the med refill for Metformin 500 mg sent to her mail delivery pharmacy.    Please assist. Thank you.

## 2024-07-05 RX ORDER — PANTOPRAZOLE SODIUM 40 MG/1
40 TABLET, DELAYED RELEASE ORAL DAILY
Qty: 30 TABLET | Refills: 4 | Status: SHIPPED | OUTPATIENT
Start: 2024-07-05

## 2024-07-05 NOTE — TELEPHONE ENCOUNTER
PCP: Elida Corona MD    Last appt:  8/15/2023   Future Appointments   Date Time Provider Department Center   7/25/2024  2:30 PM Elida Corona MD [facility]   8/15/2024  2:30 PM Get Ortiz MD [facility]       Requested Prescriptions     Pending Prescriptions Disp Refills    amLODIPine (NORVASC) 5 MG tablet [Pharmacy Med Name: AMLODIPINE BESYLATE TABS 5MG]  0       Request for a 30 or 90 day supply? Provider Discretion    Pharmacy: confirmed    Other Comments:n/a     18

## 2024-07-08 RX ORDER — AMLODIPINE BESYLATE 5 MG/1
5 TABLET ORAL DAILY
Qty: 90 TABLET | Refills: 2 | Status: SHIPPED | OUTPATIENT
Start: 2024-07-08

## 2024-07-11 RX ORDER — PANTOPRAZOLE SODIUM 40 MG/1
40 TABLET, DELAYED RELEASE ORAL DAILY
Qty: 30 TABLET | Refills: 4 | OUTPATIENT
Start: 2024-07-11

## 2024-07-31 RX ORDER — PANTOPRAZOLE SODIUM 40 MG/1
40 TABLET, DELAYED RELEASE ORAL DAILY
Qty: 30 TABLET | Refills: 4 | Status: SHIPPED | OUTPATIENT
Start: 2024-07-31

## 2024-07-31 NOTE — TELEPHONE ENCOUNTER
Medication(s) requesting:   Requested Prescriptions     Pending Prescriptions Disp Refills    pantoprazole (PROTONIX) 40 MG tablet 30 tablet 4     Sig: Take 1 tablet by mouth daily       Last office visit:  05/22/24  Next office visit DMA: 8/19/2024

## 2024-08-15 ENCOUNTER — OFFICE VISIT (OUTPATIENT)
Age: 84
End: 2024-08-15

## 2024-08-15 VITALS
HEIGHT: 61 IN | BODY MASS INDEX: 25.3 KG/M2 | WEIGHT: 134 LBS | OXYGEN SATURATION: 98 % | DIASTOLIC BLOOD PRESSURE: 68 MMHG | HEART RATE: 71 BPM | SYSTOLIC BLOOD PRESSURE: 137 MMHG

## 2024-08-15 DIAGNOSIS — I10 ESSENTIAL HYPERTENSION WITH GOAL BLOOD PRESSURE LESS THAN 140/90: Primary | ICD-10-CM

## 2024-08-15 ASSESSMENT — PATIENT HEALTH QUESTIONNAIRE - PHQ9
4. FEELING TIRED OR HAVING LITTLE ENERGY: NOT AT ALL
SUM OF ALL RESPONSES TO PHQ9 QUESTIONS 1 & 2: 0
3. TROUBLE FALLING OR STAYING ASLEEP: NOT AT ALL
6. FEELING BAD ABOUT YOURSELF - OR THAT YOU ARE A FAILURE OR HAVE LET YOURSELF OR YOUR FAMILY DOWN: NOT AT ALL
8. MOVING OR SPEAKING SO SLOWLY THAT OTHER PEOPLE COULD HAVE NOTICED. OR THE OPPOSITE, BEING SO FIGETY OR RESTLESS THAT YOU HAVE BEEN MOVING AROUND A LOT MORE THAN USUAL: NOT AT ALL
7. TROUBLE CONCENTRATING ON THINGS, SUCH AS READING THE NEWSPAPER OR WATCHING TELEVISION: NOT AT ALL
SUM OF ALL RESPONSES TO PHQ QUESTIONS 1-9: 0
SUM OF ALL RESPONSES TO PHQ QUESTIONS 1-9: 0
5. POOR APPETITE OR OVEREATING: NOT AT ALL
SUM OF ALL RESPONSES TO PHQ QUESTIONS 1-9: 0
1. LITTLE INTEREST OR PLEASURE IN DOING THINGS: NOT AT ALL
2. FEELING DOWN, DEPRESSED OR HOPELESS: NOT AT ALL
10. IF YOU CHECKED OFF ANY PROBLEMS, HOW DIFFICULT HAVE THESE PROBLEMS MADE IT FOR YOU TO DO YOUR WORK, TAKE CARE OF THINGS AT HOME, OR GET ALONG WITH OTHER PEOPLE: NOT DIFFICULT AT ALL
SUM OF ALL RESPONSES TO PHQ QUESTIONS 1-9: 0
9. THOUGHTS THAT YOU WOULD BE BETTER OFF DEAD, OR OF HURTING YOURSELF: NOT AT ALL

## 2024-08-15 NOTE — PROGRESS NOTES
Identified pt with two pt identifiers(name and ). Reviewed record in preparation for visit and have obtained necessary documentation.    Rona Carole Goldberg presents today for   Chief Complaint   Patient presents with    Follow-up     1 year        Pt c/o CHEST PAIN/ PRESSURE, falls x2             Rona Carole Goldberg preferred language for health care discussion is english/other.    Personal Protective Equipment:   Personal Protective Equipment was used including: mask-surgical and hands-gloves. Patient was placed on no precaution(s). Patient was not masked.    Precautions:   Patient currently on None  Patient currently roomed with door closed.    Is someone accompanying this pt? no    Is the patient using any DME equipment during OV? no    Depression Screenin/15/2024     2:54 PM 2024     2:55 PM 10/25/2023     2:50 PM 2023     1:25 PM 2023     2:39 PM 2022     3:01 PM 2022     1:46 PM   PHQ-9 Questionaire   Little interest or pleasure in doing things 0 3 2 0 1 0 1   Feeling down, depressed, or hopeless 0 1 2 0 1 0 1   Trouble falling or staying asleep, or sleeping too much 0 1 0    0   Feeling tired or having little energy 0 3 0    1   Poor appetite or overeating 0 1 2    1   Feeling bad about yourself - or that you are a failure or have let yourself or your family down 0 1 2    1   Trouble concentrating on things, such as reading the newspaper or watching television 0 1 0    0   Moving or speaking so slowly that other people could have noticed. Or the opposite - being so fidgety or restless that you have been moving around a lot more than usual 0 0 0    0   Thoughts that you would be better off dead, or of hurting yourself in some way 0 0 0       PHQ-9 Total Score 0 11 8 0 2 0 5   If you checked off any problems, how difficult have these problems made it for you to do your work, take care of things at home, or get along with other people? 0 0 1            Learning 
03:31 PM    CALCIUM 9.2 05/14/2024 03:31 PM           Latest Ref Rng & Units 5/14/2024     3:31 PM 8/24/2023     3:20 PM 2/27/2023     3:30 PM 12/7/2022     3:37 AM 10/12/2022     1:37 PM   Lipids   Chol 110 - 200 mg/dL 134  143       HDL >=40 mg/dL 62  71       LDL Calc 50 - 99 mg/dL 45  50       VLDL Calc 8 - 30 mg/dL 26  22       Trig 40 - 149 mg/dL 131  112       Ratio 0.0 - 5.0 2.2  2.0       ALT 5 - 40 U/L 14   25  15  15    AST 10 - 37 U/L 17   19  17  18    LDL 50 - 99 mg/dL 45  50         Lab Results   Component Value Date/Time    ALT 14 05/14/2024 03:31 PM     Hemoglobin A1C   Date Value Ref Range Status   05/14/2024 7.1 (H) 4.8 - 5.6 % Final     Lab Results   Component Value Date    TSH 1.52 02/27/2023     TRANSTHORACIC ECHOCARDIOGRAM (TTE) LIMITED (CONTRAST/BUBBLE/3D PRN) 02/28/2023  2:46 PM, 02/28/2023 12:00 AM (Final)  Interpretation Summary    Left Ventricle: Normal left ventricular systolic function with a visually estimated EF of 55 - 60%. Normal wall motion.    Right Ventricle: Right ventricle size is normal. Normal systolic function. TAPSE is normal.    Aortic Valve: No regurgitation. No stenosis.    Mitral Valve: Mild to moderate regurgitation. No stenosis noted.    Tricuspid Valve: Trace regurgitation. No stenosis noted.    Left Atrium: Left atrium is dilated.    Pulmonary Arteries: Pulmonary hypertension not present. The estimated PASP is 17 mmHg.    Technical qualifiers: Color flow Doppler was performed and pulse wave and/or continuous wave Doppler was performed.    STRESS TEST (03/2023)  CONCLUSIONS     * Myocardial perfusion imaging is normal.     * Normal stress and rest myocardial perfusion study without evidence of inducible ischemia or scar.     * Normal left ventricular function with calculated left ventricular ejection fraction (LVEF) 82 %.     * Stress Single Photon Emission Computed Tomography (SPECT) tomographic images reveal homogeneous tracer uptake throughout the myocardium. Rest

## 2024-08-19 ENCOUNTER — OFFICE VISIT (OUTPATIENT)
Facility: CLINIC | Age: 84
End: 2024-08-19

## 2024-08-19 VITALS
HEIGHT: 61 IN | HEART RATE: 69 BPM | RESPIRATION RATE: 15 BRPM | TEMPERATURE: 98.2 F | DIASTOLIC BLOOD PRESSURE: 70 MMHG | WEIGHT: 134.2 LBS | OXYGEN SATURATION: 96 % | BODY MASS INDEX: 25.34 KG/M2 | SYSTOLIC BLOOD PRESSURE: 133 MMHG

## 2024-08-19 DIAGNOSIS — I10 PRIMARY HYPERTENSION: ICD-10-CM

## 2024-08-19 DIAGNOSIS — L98.9 LESION OF FACE: ICD-10-CM

## 2024-08-19 DIAGNOSIS — N64.4 BREAST PAIN: ICD-10-CM

## 2024-08-19 DIAGNOSIS — G89.29 CHRONIC NECK PAIN: ICD-10-CM

## 2024-08-19 DIAGNOSIS — E11.42 TYPE 2 DIABETES MELLITUS WITH DIABETIC POLYNEUROPATHY, WITHOUT LONG-TERM CURRENT USE OF INSULIN (HCC): ICD-10-CM

## 2024-08-19 DIAGNOSIS — N39.46 MIXED STRESS AND URGE URINARY INCONTINENCE: ICD-10-CM

## 2024-08-19 DIAGNOSIS — M54.2 CHRONIC NECK PAIN: ICD-10-CM

## 2024-08-19 DIAGNOSIS — Z00.00 MEDICARE ANNUAL WELLNESS VISIT, SUBSEQUENT: Primary | ICD-10-CM

## 2024-08-19 LAB — HBA1C MFR BLD: 6.6 %

## 2024-08-19 ASSESSMENT — PATIENT HEALTH QUESTIONNAIRE - PHQ9
7. TROUBLE CONCENTRATING ON THINGS, SUCH AS READING THE NEWSPAPER OR WATCHING TELEVISION: NOT AT ALL
SUM OF ALL RESPONSES TO PHQ QUESTIONS 1-9: 0
10. IF YOU CHECKED OFF ANY PROBLEMS, HOW DIFFICULT HAVE THESE PROBLEMS MADE IT FOR YOU TO DO YOUR WORK, TAKE CARE OF THINGS AT HOME, OR GET ALONG WITH OTHER PEOPLE: NOT DIFFICULT AT ALL
8. MOVING OR SPEAKING SO SLOWLY THAT OTHER PEOPLE COULD HAVE NOTICED. OR THE OPPOSITE, BEING SO FIGETY OR RESTLESS THAT YOU HAVE BEEN MOVING AROUND A LOT MORE THAN USUAL: NOT AT ALL
SUM OF ALL RESPONSES TO PHQ QUESTIONS 1-9: 0
4. FEELING TIRED OR HAVING LITTLE ENERGY: MORE THAN HALF THE DAYS
6. FEELING BAD ABOUT YOURSELF - OR THAT YOU ARE A FAILURE OR HAVE LET YOURSELF OR YOUR FAMILY DOWN: SEVERAL DAYS
7. TROUBLE CONCENTRATING ON THINGS, SUCH AS READING THE NEWSPAPER OR WATCHING TELEVISION: NOT AT ALL
5. POOR APPETITE OR OVEREATING: NOT AT ALL
5. POOR APPETITE OR OVEREATING: NOT AT ALL
2. FEELING DOWN, DEPRESSED OR HOPELESS: MORE THAN HALF THE DAYS
9. THOUGHTS THAT YOU WOULD BE BETTER OFF DEAD, OR OF HURTING YOURSELF: NOT AT ALL
SUM OF ALL RESPONSES TO PHQ9 QUESTIONS 1 & 2: 0
4. FEELING TIRED OR HAVING LITTLE ENERGY: NOT AT ALL
6. FEELING BAD ABOUT YOURSELF - OR THAT YOU ARE A FAILURE OR HAVE LET YOURSELF OR YOUR FAMILY DOWN: NOT AT ALL
SUM OF ALL RESPONSES TO PHQ QUESTIONS 1-9: 8
1. LITTLE INTEREST OR PLEASURE IN DOING THINGS: NOT AT ALL
SUM OF ALL RESPONSES TO PHQ QUESTIONS 1-9: 8
8. MOVING OR SPEAKING SO SLOWLY THAT OTHER PEOPLE COULD HAVE NOTICED. OR THE OPPOSITE, BEING SO FIGETY OR RESTLESS THAT YOU HAVE BEEN MOVING AROUND A LOT MORE THAN USUAL: NOT AT ALL
SUM OF ALL RESPONSES TO PHQ QUESTIONS 1-9: 8
SUM OF ALL RESPONSES TO PHQ QUESTIONS 1-9: 8
3. TROUBLE FALLING OR STAYING ASLEEP: SEVERAL DAYS
SUM OF ALL RESPONSES TO PHQ QUESTIONS 1-9: 0
10. IF YOU CHECKED OFF ANY PROBLEMS, HOW DIFFICULT HAVE THESE PROBLEMS MADE IT FOR YOU TO DO YOUR WORK, TAKE CARE OF THINGS AT HOME, OR GET ALONG WITH OTHER PEOPLE: NOT DIFFICULT AT ALL
SUM OF ALL RESPONSES TO PHQ QUESTIONS 1-9: 0
9. THOUGHTS THAT YOU WOULD BE BETTER OFF DEAD, OR OF HURTING YOURSELF: NOT AT ALL
2. FEELING DOWN, DEPRESSED OR HOPELESS: NOT AT ALL
SUM OF ALL RESPONSES TO PHQ9 QUESTIONS 1 & 2: 4
1. LITTLE INTEREST OR PLEASURE IN DOING THINGS: MORE THAN HALF THE DAYS
3. TROUBLE FALLING OR STAYING ASLEEP: NOT AT ALL

## 2024-08-26 ENCOUNTER — HOSPITAL ENCOUNTER (OUTPATIENT)
Facility: HOSPITAL | Age: 84
Setting detail: RECURRING SERIES
Discharge: HOME | End: 2024-08-29
Payer: MEDICARE

## 2024-08-26 PROCEDURE — 97161 PT EVAL LOW COMPLEX 20 MIN: CPT

## 2024-08-26 PROCEDURE — 97535 SELF CARE MNGMENT TRAINING: CPT

## 2024-08-26 NOTE — PROGRESS NOTES
[facility]  [address], [address] [place] [address]  Ph [phone]   Fx [phone]    Plan of Care/ Statement of Necessity for Physical Therapy Services    Patient Name: Rona Carole Goldberg : 1940   Medical   Diagnosis: Mixed incontinence [N39.46] Treatment Diagnosis: R39.89  Other signs and symptoms of genitourinary system and N39.46  MIXED INCONTINENCE      Onset Date: 2023 Payor :  Payor: [employer] / Plan: [employer] VALUE HMO / Product Type: *No Product type* /    Referral Source: Elida Corona MD Start of Care (SOC): 2024   Prior Hospitalization: See medical history Provider #: [id number]   Prior Level of Function: Independent, active   Comorbidities: Osteoporosis, past surgeries - R shoulder arthroscopy, thumb repair, hysterectomy, colectomy, esophagogastroduodenoscopy, 3 slopped discs in neck (as per pt)              The Plan of Care and following information is based on the information from the initial evaluation.  Assessment/ key information: Patient is a 83 year old female presenting to Physical Therapy with c/o mixed urinary incontinence and urinary retention which is limiting ability function at previous level.  Patient presents with decreased strength, coordination, and endurance of the pelvic floor muscles.  Patient presents with fair understanding of bladder and bowel anatomy/function, dietary irritants, and urge suppression. I feel patient would benefit from skilled therapeutic intervention to optimize highest functional level possible.      Patient will continue to benefit from skilled PT services to modify and progress therapeutic interventions, address functional mobility deficits, address ROM deficits, address strength deficits, analyze and address soft tissue restrictions, analyze and cue movement patterns, analyze and modify body mechanics/ergonomics, assess and modify postural abnormalities, and instruct in home and community  sessions:  Patient will report bladder continence 50-75% of the time with cough/sneeze/laugh and walking to the toilet.   Status at eval: patient stress and urgency incontinence 2 times  per week     Patient will only have urinary retention episodes <25% of the time  Status at eval: urinary retention 50% of the time      Patient will demonstrate improvement of current complaints evidenced by a 12 point  improvement in VILMA  Status at Eval: VILMA 33     Patient will demonstrate independence with management tools and exercise program that are beneficial for current condition in order to feel comfortable with Pelvic floor PT D/C and not fear exacerbation of current condition or symptoms returning.   Status at eval : patient fearful of return to exercise and unaware of what activities to avoid to avoid exacerbation of current condition    Frequency / Duration: Patient to be seen 1-2 times per week for 12 weeks    Patient/ Caregiver education and instruction: Diagnosis, prognosis, Proper Voiding Habits, Diet, Pain Management, Exercises, and Bladder Retraining    Certification Period: 8/26/24 - 11/18/24    Anca Reilly, PT 8/26/2024 3:12 PM    ________________________________________________________________________    I certify that the above Therapy Services are being furnished while the patient is under my care. I agree with the treatment plan and certify that this therapy is necessary.    Physician's Signature:____________Date:_________TIME:________     Elida Corona MD  ** Signature, Date and Time must be completed for valid certification **      Please sign and return to [facility]  [address] [place] [address]  Ph [phone]  Fx [phone]
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
## 2024-08-27 ENCOUNTER — HOSPITAL ENCOUNTER (OUTPATIENT)
Facility: HOSPITAL | Age: 84
Setting detail: RECURRING SERIES
Discharge: HOME | End: 2024-08-30
Payer: MEDICARE

## 2024-08-27 PROCEDURE — 97535 SELF CARE MNGMENT TRAINING: CPT

## 2024-08-27 PROCEDURE — 97112 NEUROMUSCULAR REEDUCATION: CPT

## 2024-08-27 PROCEDURE — 97161 PT EVAL LOW COMPLEX 20 MIN: CPT
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
## 2024-08-29 NOTE — TELEPHONE ENCOUNTER
CHILD LIFE PREPARATION NOTE    Patient Name: Bettye  Age: 10 year old  Developmental Considerations: Not known  Does this patient need an Adaptive Care Plan? Not known   Services Utilized: not applicable    CCLS transitioned bedside to provide preparation for upcoming surgery in order to promote positive coping (i.e.increase understanding, gain mastery and control of experiences, desensitize equipment, familiarize hospital environment, etc.).    Situational Information  Location of preparation: ASC/pre-surgery  Prior surgery/procedure experience: Unknown  Family present: Yes: mother  Preparation techniques/materials utilized: verbal explanation on what to expect with going to surgery using dolls and actual medical items to help explain. Patient smelled the anesthesia mask, looked at the surgical hat and IV, and engaged in medical play.   Explanation included: sensory information, sequence of events, length of event, address misconceptions, introduction of coping techniques, medical play.    Patient Response  Patient affect and engagement throughout preparation session: sweet, attentive  Questions/concerns expressed by patient and/or caregiver: non asked    Plan for procedure support/coping: Besides medical play, CCLS provided a sticker scene and search and find book for distraction and relaxation. Patient actively worked on these activities.  CCLS engaged in conversation to provide support and normalization of the environment.    Child life services will remain available to provide support to patient and family as needed throughout healthcare experiences.    Left VM to call back.

## 2024-09-03 ENCOUNTER — TELEPHONE (OUTPATIENT)
Facility: CLINIC | Age: 84
End: 2024-09-03

## 2024-09-03 NOTE — TELEPHONE ENCOUNTER
Pt called requesting 2 copies of the Advance Directive form and would like them mailed to her at the address on file.    Please assist. Thank you.

## 2024-09-11 ENCOUNTER — HOSPITAL ENCOUNTER (OUTPATIENT)
Facility: HOSPITAL | Age: 84
Setting detail: RECURRING SERIES
End: 2024-09-11
Payer: MEDICARE
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
## 2024-09-12 ENCOUNTER — HOSPITAL ENCOUNTER (OUTPATIENT)
Facility: HOSPITAL | Age: 84
Setting detail: RECURRING SERIES
End: 2024-09-12
Payer: MEDICARE

## 2024-09-12 NOTE — PROGRESS NOTES
PHYSICAL / OCCUPATIONAL THERAPY - DAILY TREATMENT NOTE (updated )    Patient Name: Rona Carole Goldberg    Date: 2024    : 1940  Insurance: [facility] / Plan: [employer] MEDICARE VALUE HMO / Product Type: *No Product type* /      Patient  verified Yes     Visit #   Current / Total 2 16   Time   In / Out *** ***   Pain   In / Out *** ***   Subjective Functional Status/Changes: ***     TREATMENT AREA =  Mixed incontinence [N39.46]    OBJECTIVE    {[facility] Modality Grid:11640}     Therapeutic Procedures:    Tx Min Billable or 1:1 Min (if diff from Tx Min) Procedure, Rationale, Specifics     {[facility] Ther Procedures:17698}     Details if applicable:         {[facility] Ther Procedures:44378}     Details if applicable:       {[facility] Ther Procedures:44021}     Details if applicable:       {[facility] Ther Procedures:94680}     Details if applicable:       {[facility] Ther Procedures:80133}     Details if applicable:       [facility] Totals Reminder: bill using total billable min of TIMED therapeutic procedures (example: do not include dry needle or estim unattended, both untimed codes, in totals to left)  8-22 min = 1 unit; 23-37 min = 2 units; 38-52 min = 3 units; 53-67 min = 4 units; 68-82 min = 5 units   Total Total     [x]  Patient Education billed concurrently with other procedures   [x] Review HEP    [] Progressed/Changed HEP, detail:    [] Other detail:       Objective Information/Functional Measures/Assessment    ***    Patient will continue to benefit from skilled PT / OT services to {[facility]:61631} to address functional deficits and attain remaining goals.    Progress toward goals / Updated goals:  []  See Progress Note/Recertification    Short term goals: To be achieved in 6 sessions:  Patient will demonstrate proper dietary/fluid habits and urge suppression strategies that promote bladder and bowel health to aid in management of urinary urgency and incontinence.  Status

## 2024-09-13 ENCOUNTER — APPOINTMENT (OUTPATIENT)
Facility: HOSPITAL | Age: 84
End: 2024-09-13
Payer: MEDICARE

## 2024-09-18 ENCOUNTER — APPOINTMENT (OUTPATIENT)
Facility: HOSPITAL | Age: 84
End: 2024-09-18
Payer: MEDICARE

## 2024-09-18 RX ORDER — DAPAGLIFLOZIN 10 MG/1
10 TABLET, FILM COATED ORAL DAILY
Qty: 90 TABLET | Refills: 2 | Status: SHIPPED | OUTPATIENT
Start: 2024-09-18

## 2024-09-19 ENCOUNTER — HOSPITAL ENCOUNTER (OUTPATIENT)
Facility: HOSPITAL | Age: 84
Setting detail: RECURRING SERIES
Discharge: HOME | End: 2024-09-22
Payer: MEDICARE

## 2024-09-19 PROCEDURE — 97535 SELF CARE MNGMENT TRAINING: CPT

## 2024-09-19 PROCEDURE — 97112 NEUROMUSCULAR REEDUCATION: CPT

## 2024-09-20 ENCOUNTER — HOSPITAL ENCOUNTER (OUTPATIENT)
Facility: HOSPITAL | Age: 84
Setting detail: RECURRING SERIES
Discharge: HOME | End: 2024-09-23
Payer: MEDICARE

## 2024-09-20 PROCEDURE — 97530 THERAPEUTIC ACTIVITIES: CPT

## 2024-09-20 PROCEDURE — 97112 NEUROMUSCULAR REEDUCATION: CPT

## 2024-09-20 PROCEDURE — 97110 THERAPEUTIC EXERCISES: CPT

## 2024-09-20 RX ORDER — LEVOTHYROXINE SODIUM 50 UG/1
50 TABLET ORAL
Qty: 90 TABLET | Refills: 3 | Status: SHIPPED | OUTPATIENT
Start: 2024-09-20

## 2024-09-20 ASSESSMENT — ENCOUNTER SYMPTOMS
BACK PAIN: 1
SHORTNESS OF BREATH: 0
ABDOMINAL PAIN: 0

## 2024-09-24 ENCOUNTER — HOSPITAL ENCOUNTER (OUTPATIENT)
Facility: HOSPITAL | Age: 84
Setting detail: RECURRING SERIES
End: 2024-09-24
Payer: MEDICARE

## 2024-09-24 NOTE — PROGRESS NOTES
PHYSICAL / OCCUPATIONAL THERAPY - DAILY TREATMENT NOTE (updated )    Patient Name: Rona Carole Goldberg    Date: 2024    : 1940  Insurance: Payor: [employer] MEDICARE / Plan: [employer] MEDICARE VALUE HMO / Product Type: *No Product type* /      Patient  verified Yes     Visit #   Current / Total 3 16   Time   In / Out 12:40 1:20   Pain   In / Out 0 0   Subjective Functional Status/Changes: Pt reports having diarrhea lately. She reports she's been bending forward while on the toilet and that helps her eliminate more urine. Pt has been able to delay urge to go, is only urinating 3 times a day. She denies leaking, but had one moment of bowel leakage.      TREATMENT AREA =  Mixed incontinence [N39.46]    OBJECTIVE         Therapeutic Procedures:    Tx Min Billable or 1:1 Min (if diff from Tx Min) Procedure, Rationale, Specifics   12  59998 Self Care/Home Management (timed):  improve patient knowledge and understanding of see below  to improve patient's ability to progress to PLOF and address remaining functional goals.  (see flow sheet as applicable)     Details if applicable:  pt edu proper urinary frequency per day and other urinary habits, pt discussion on inflammatory foods that can contribute to diarrhea     28  41374 Neuromuscular Re-Education (timed):  improve balance, coordination, kinesthetic sense, posture, core stability and proprioception to improve patient's ability to develop conscious control of individual muscles and awareness of position of extremities in order to progress to PLOF and address remaining functional goals. (see flow sheet as applicable)     Details if applicable:            Details if applicable:            Details if applicable:            Details if applicable:     40  [facility] Totals Reminder: bill using total billable min of TIMED therapeutic procedures (example: do not include dry needle or estim unattended, both untimed codes, in totals to left)  8-22 min = 1 unit; 23-37

## 2024-09-25 ENCOUNTER — HOSPITAL ENCOUNTER (OUTPATIENT)
Facility: HOSPITAL | Age: 84
Setting detail: RECURRING SERIES
End: 2024-09-25
Payer: MEDICARE

## 2024-09-25 NOTE — PROGRESS NOTES
PHYSICAL / OCCUPATIONAL THERAPY - DAILY TREATMENT NOTE (updated )    Patient Name: Rona Carole Goldberg    Date: 2024    : 1940  Insurance: Payor: [employer] MEDICARE / Plan: [employer] MEDICARE VALUE HMO / Product Type: *No Product type* /      Patient  verified Yes     Visit #   Current / Total 3 16   Time   In / Out 12:40 1:20   Pain   In / Out 0 0   Subjective Functional Status/Changes: Pt reports having diarrhea lately. She reports she's been bending forward while on the toilet and that helps her eliminate more urine. Pt has been able to delay urge to go, is only urinating 3 times a day. She denies leaking, but had one moment of bowel leakage.      TREATMENT AREA =  Cervicalgia [M54.2]    OBJECTIVE         Therapeutic Procedures:    Tx Min Billable or 1:1 Min (if diff from Tx Min) Procedure, Rationale, Specifics   12  30145 Self Care/Home Management (timed):  improve patient knowledge and understanding of see below  to improve patient's ability to progress to PLOF and address remaining functional goals.  (see flow sheet as applicable)     Details if applicable:  pt edu proper urinary frequency per day and other urinary habits, pt discussion on inflammatory foods that can contribute to diarrhea     28  57120 Neuromuscular Re-Education (timed):  improve balance, coordination, kinesthetic sense, posture, core stability and proprioception to improve patient's ability to develop conscious control of individual muscles and awareness of position of extremities in order to progress to PLOF and address remaining functional goals. (see flow sheet as applicable)     Details if applicable:            Details if applicable:            Details if applicable:            Details if applicable:     40  [facility] Totals Reminder: bill using total billable min of TIMED therapeutic procedures (example: do not include dry needle or estim unattended, both untimed codes, in totals to left)  8-22 min = 1 unit; 23-37 min = 2

## 2024-09-27 ENCOUNTER — TELEPHONE (OUTPATIENT)
Facility: CLINIC | Age: 84
End: 2024-09-27

## 2024-09-27 ENCOUNTER — HOSPITAL ENCOUNTER (OUTPATIENT)
Facility: HOSPITAL | Age: 84
Setting detail: RECURRING SERIES
End: 2024-09-27
Payer: MEDICARE

## 2024-09-27 DIAGNOSIS — N64.4 BREAST PAIN: Primary | ICD-10-CM

## 2024-09-27 NOTE — TELEPHONE ENCOUNTER
[facility] of breast order be fax to [phone]. Patient has a upcoming appointment on Monday. Please be advised, thank you.

## 2024-10-09 RX ORDER — ATENOLOL 50 MG/1
50 TABLET ORAL DAILY
Qty: 90 TABLET | Refills: 2 | Status: SHIPPED | OUTPATIENT
Start: 2024-10-09

## 2024-10-09 RX ORDER — AMLODIPINE BESYLATE 5 MG/1
5 TABLET ORAL DAILY
Qty: 90 TABLET | Refills: 2 | Status: SHIPPED | OUTPATIENT
Start: 2024-10-09

## 2024-10-09 NOTE — TELEPHONE ENCOUNTER
Pt called to request med refill for Atenolol 40 mg tab and Amlodipine 5 mg.    LOV:  8/16/24    Future Appointments   Date Time Provider Department   11/19/2024  2:00 PM Elida Corona MD [facility]     Please assist. Thank you.

## 2024-10-24 ENCOUNTER — OFFICE VISIT (OUTPATIENT)
Facility: CLINIC | Age: 84
End: 2024-10-24

## 2024-10-24 VITALS
HEART RATE: 71 BPM | WEIGHT: 131.4 LBS | RESPIRATION RATE: 17 BRPM | BODY MASS INDEX: 24.81 KG/M2 | TEMPERATURE: 97.1 F | OXYGEN SATURATION: 97 % | SYSTOLIC BLOOD PRESSURE: 138 MMHG | DIASTOLIC BLOOD PRESSURE: 68 MMHG | HEIGHT: 61 IN

## 2024-10-24 DIAGNOSIS — I10 PRIMARY HYPERTENSION: ICD-10-CM

## 2024-10-24 DIAGNOSIS — H81.13 BENIGN PAROXYSMAL POSITIONAL VERTIGO DUE TO BILATERAL VESTIBULAR DISORDER: Primary | ICD-10-CM

## 2024-10-24 DIAGNOSIS — E11.42 TYPE 2 DIABETES MELLITUS WITH DIABETIC POLYNEUROPATHY, WITHOUT LONG-TERM CURRENT USE OF INSULIN (HCC): ICD-10-CM

## 2024-10-24 DIAGNOSIS — F90.0 ATTENTION DEFICIT HYPERACTIVITY DISORDER (ADHD), PREDOMINANTLY INATTENTIVE TYPE: ICD-10-CM

## 2024-10-24 RX ORDER — MECLIZINE HYDROCHLORIDE 25 MG/1
25 TABLET ORAL 3 TIMES DAILY PRN
Qty: 15 TABLET | Refills: 0 | Status: SHIPPED | OUTPATIENT
Start: 2024-10-24 | End: 2024-11-03

## 2024-10-24 SDOH — ECONOMIC STABILITY: FOOD INSECURITY: WITHIN THE PAST 12 MONTHS, YOU WORRIED THAT YOUR FOOD WOULD RUN OUT BEFORE YOU GOT MONEY TO BUY MORE.: NEVER TRUE

## 2024-10-24 SDOH — ECONOMIC STABILITY: FOOD INSECURITY: WITHIN THE PAST 12 MONTHS, THE FOOD YOU BOUGHT JUST DIDN'T LAST AND YOU DIDN'T HAVE MONEY TO GET MORE.: NEVER TRUE

## 2024-10-24 SDOH — ECONOMIC STABILITY: INCOME INSECURITY: HOW HARD IS IT FOR YOU TO PAY FOR THE VERY BASICS LIKE FOOD, HOUSING, MEDICAL CARE, AND HEATING?: NOT HARD AT ALL

## 2024-10-24 ASSESSMENT — PATIENT HEALTH QUESTIONNAIRE - PHQ9
6. FEELING BAD ABOUT YOURSELF - OR THAT YOU ARE A FAILURE OR HAVE LET YOURSELF OR YOUR FAMILY DOWN: SEVERAL DAYS
SUM OF ALL RESPONSES TO PHQ QUESTIONS 1-9: 8
4. FEELING TIRED OR HAVING LITTLE ENERGY: MORE THAN HALF THE DAYS
SUM OF ALL RESPONSES TO PHQ QUESTIONS 1-9: 8
SUM OF ALL RESPONSES TO PHQ QUESTIONS 1-9: 8
8. MOVING OR SPEAKING SO SLOWLY THAT OTHER PEOPLE COULD HAVE NOTICED. OR THE OPPOSITE, BEING SO FIGETY OR RESTLESS THAT YOU HAVE BEEN MOVING AROUND A LOT MORE THAN USUAL: NOT AT ALL
3. TROUBLE FALLING OR STAYING ASLEEP: SEVERAL DAYS
9. THOUGHTS THAT YOU WOULD BE BETTER OFF DEAD, OR OF HURTING YOURSELF: NOT AT ALL
7. TROUBLE CONCENTRATING ON THINGS, SUCH AS READING THE NEWSPAPER OR WATCHING TELEVISION: NOT AT ALL
10. IF YOU CHECKED OFF ANY PROBLEMS, HOW DIFFICULT HAVE THESE PROBLEMS MADE IT FOR YOU TO DO YOUR WORK, TAKE CARE OF THINGS AT HOME, OR GET ALONG WITH OTHER PEOPLE: NOT DIFFICULT AT ALL
1. LITTLE INTEREST OR PLEASURE IN DOING THINGS: MORE THAN HALF THE DAYS
5. POOR APPETITE OR OVEREATING: NOT AT ALL
SUM OF ALL RESPONSES TO PHQ9 QUESTIONS 1 & 2: 4
SUM OF ALL RESPONSES TO PHQ QUESTIONS 1-9: 8
2. FEELING DOWN, DEPRESSED OR HOPELESS: MORE THAN HALF THE DAYS

## 2024-10-24 NOTE — PROGRESS NOTES
Rona Carole Goldberg is a 83 y.o. year old female who presents today for   Chief Complaint   Patient presents with    Dizziness    Nausea        \"Have you been to the ER, urgent care clinic since your last visit?  Hospitalized since your last visit?\"   NO     “Have you seen or consulted any other health care providers outside our system since your last visit?”   NO       “Have you had a diabetic eye exam?”    NO     Date of last diabetic eye exam: 1/25/2021           Beatris Mon [facility]  [address] [place] [address] [address]  Ph: [phone]  Direct Fax: [phone]

## 2024-10-24 NOTE — PROGRESS NOTES
Rona Carole Goldberg is a 83 y.o.  female and presents with    Chief Complaint   Patient presents with    Dizziness    Nausea     Subjective:  Ms. Goldberg presents with a two day hx of dizziness and vertigo. Pt reports that at approximately 4am Tuesday morning she was woken up by a severe sensation of the room spinning around her. It was accompanied by nausea, vomiting, diaphoresis, and feeling of being warm and flush. Pt also reports difficulty tracking with both eyes. Symptoms remained throughout the day Tuesday and began to gradually improve throughout the day Wednesday.    Pt denies any associated headaches, tinnitus, hearing loss, vision loss, weakness, hemiplegia, hemiparesis, dysphasia, dysphagia, loss of consciousness or altered mental status. Pt denies any recent illness, sick contacts, shortness of breath, chest pain, dysuria, or edema.     Pt has had episodes of vertigo in the past but felt that this episode was more severe and warranted further workup.   ROS   History obtained from chart review and the patient  General ROS: negative  Psychological ROS: negative  ENT ROS: negative  Allergy and Immunology ROS: negative  Respiratory ROS: no cough, shortness of breath, or wheezing  Cardiovascular ROS: no chest pain or dyspnea on exertion  Genito-Urinary ROS: no dysuria, trouble voiding, or hematuria  Musculoskeletal ROS: negative  Dermatological ROS: negative    All other systems reviewed and are negative.      Objective:  Vitals:    10/24/24 1431   BP: 138/68   Pulse: 71   Resp: 17   Temp: 97.1 °F (36.2 °C)   SpO2: 97%       General appearance - alert, well appearing, and in no distress  Mental status - alert, oriented to person, place, and time  Eyes - pupils equal and reactive, extraocular eye movements intact, nystagmus noted with lateral eye movement to both the left and right  Mouth - mucous membranes dry, pharynx normal without lesions  Neck - supple, no significant adenopathy  Chest - clear

## 2024-11-07 RX ORDER — AMLODIPINE BESYLATE 5 MG/1
5 TABLET ORAL DAILY
Qty: 90 TABLET | Refills: 2 | Status: SHIPPED | OUTPATIENT
Start: 2024-11-07

## 2024-11-07 NOTE — TELEPHONE ENCOUNTER
Medication(s) requesting:   Requested Prescriptions     Pending Prescriptions Disp Refills    amLODIPine (NORVASC) 5 MG tablet 90 tablet 2     Sig: Take 1 tablet by mouth daily       Last office visit:  10/24/24  Next office visit DMA: 11/19/2024

## 2024-11-18 ENCOUNTER — FOLLOW UP (OUTPATIENT)
Dept: URBAN - METROPOLITAN AREA CLINIC 1 | Facility: CLINIC | Age: 84
End: 2024-11-18

## 2024-11-18 DIAGNOSIS — H04.123: ICD-10-CM

## 2024-11-18 DIAGNOSIS — H16.143: ICD-10-CM

## 2024-11-18 PROCEDURE — 99213 OFFICE O/P EST LOW 20 MIN: CPT

## 2024-11-19 ENCOUNTER — OFFICE VISIT (OUTPATIENT)
Facility: CLINIC | Age: 84
End: 2024-11-19
Payer: MEDICARE

## 2024-11-19 VITALS
HEART RATE: 76 BPM | WEIGHT: 134.6 LBS | OXYGEN SATURATION: 96 % | TEMPERATURE: 97.8 F | HEIGHT: 61 IN | SYSTOLIC BLOOD PRESSURE: 122 MMHG | BODY MASS INDEX: 25.41 KG/M2 | DIASTOLIC BLOOD PRESSURE: 74 MMHG | RESPIRATION RATE: 15 BRPM

## 2024-11-19 DIAGNOSIS — R19.5 LOOSE STOOLS: ICD-10-CM

## 2024-11-19 DIAGNOSIS — I10 PRIMARY HYPERTENSION: ICD-10-CM

## 2024-11-19 DIAGNOSIS — N89.8 VAGINAL DRYNESS: ICD-10-CM

## 2024-11-19 DIAGNOSIS — E11.42 TYPE 2 DIABETES MELLITUS WITH DIABETIC POLYNEUROPATHY, WITHOUT LONG-TERM CURRENT USE OF INSULIN (HCC): Primary | ICD-10-CM

## 2024-11-19 DIAGNOSIS — N39.46 MIXED STRESS AND URGE URINARY INCONTINENCE: ICD-10-CM

## 2024-11-19 LAB — HBA1C MFR BLD: 6.8 %

## 2024-11-19 PROCEDURE — 3078F DIAST BP <80 MM HG: CPT | Performed by: STUDENT IN AN ORGANIZED HEALTH CARE EDUCATION/TRAINING PROGRAM

## 2024-11-19 PROCEDURE — 99214 OFFICE O/P EST MOD 30 MIN: CPT | Performed by: STUDENT IN AN ORGANIZED HEALTH CARE EDUCATION/TRAINING PROGRAM

## 2024-11-19 PROCEDURE — 1125F AMNT PAIN NOTED PAIN PRSNT: CPT | Performed by: STUDENT IN AN ORGANIZED HEALTH CARE EDUCATION/TRAINING PROGRAM

## 2024-11-19 PROCEDURE — 1159F MED LIST DOCD IN RCRD: CPT | Performed by: STUDENT IN AN ORGANIZED HEALTH CARE EDUCATION/TRAINING PROGRAM

## 2024-11-19 PROCEDURE — 3051F HG A1C>EQUAL 7.0%<8.0%: CPT | Performed by: STUDENT IN AN ORGANIZED HEALTH CARE EDUCATION/TRAINING PROGRAM

## 2024-11-19 PROCEDURE — 3074F SYST BP LT 130 MM HG: CPT | Performed by: STUDENT IN AN ORGANIZED HEALTH CARE EDUCATION/TRAINING PROGRAM

## 2024-11-19 PROCEDURE — 83036 HEMOGLOBIN GLYCOSYLATED A1C: CPT | Performed by: STUDENT IN AN ORGANIZED HEALTH CARE EDUCATION/TRAINING PROGRAM

## 2024-11-19 PROCEDURE — 1123F ACP DISCUSS/DSCN MKR DOCD: CPT | Performed by: STUDENT IN AN ORGANIZED HEALTH CARE EDUCATION/TRAINING PROGRAM

## 2024-11-19 RX ORDER — CALCIUM POLYCARBOPHIL 625 MG
625 TABLET ORAL DAILY
Qty: 90 TABLET | Refills: 3 | Status: SHIPPED | OUTPATIENT
Start: 2024-11-19

## 2024-11-19 RX ORDER — CONJUGATED ESTROGENS 0.62 MG/G
CREAM VAGINAL
Qty: 30 G | Refills: 3 | Status: SHIPPED | OUTPATIENT
Start: 2024-11-19

## 2024-11-19 SDOH — ECONOMIC STABILITY: INCOME INSECURITY: HOW HARD IS IT FOR YOU TO PAY FOR THE VERY BASICS LIKE FOOD, HOUSING, MEDICAL CARE, AND HEATING?: NOT HARD AT ALL

## 2024-11-19 SDOH — ECONOMIC STABILITY: FOOD INSECURITY: WITHIN THE PAST 12 MONTHS, YOU WORRIED THAT YOUR FOOD WOULD RUN OUT BEFORE YOU GOT MONEY TO BUY MORE.: NEVER TRUE

## 2024-11-19 SDOH — ECONOMIC STABILITY: FOOD INSECURITY: WITHIN THE PAST 12 MONTHS, THE FOOD YOU BOUGHT JUST DIDN'T LAST AND YOU DIDN'T HAVE MONEY TO GET MORE.: NEVER TRUE

## 2024-11-19 ASSESSMENT — PATIENT HEALTH QUESTIONNAIRE - PHQ9
SUM OF ALL RESPONSES TO PHQ QUESTIONS 1-9: 0
SUM OF ALL RESPONSES TO PHQ QUESTIONS 1-9: 0
6. FEELING BAD ABOUT YOURSELF - OR THAT YOU ARE A FAILURE OR HAVE LET YOURSELF OR YOUR FAMILY DOWN: NOT AT ALL
9. THOUGHTS THAT YOU WOULD BE BETTER OFF DEAD, OR OF HURTING YOURSELF: NOT AT ALL
2. FEELING DOWN, DEPRESSED OR HOPELESS: NOT AT ALL
7. TROUBLE CONCENTRATING ON THINGS, SUCH AS READING THE NEWSPAPER OR WATCHING TELEVISION: NOT AT ALL
5. POOR APPETITE OR OVEREATING: NOT AT ALL
1. LITTLE INTEREST OR PLEASURE IN DOING THINGS: NOT AT ALL
SUM OF ALL RESPONSES TO PHQ9 QUESTIONS 1 & 2: 0
SUM OF ALL RESPONSES TO PHQ QUESTIONS 1-9: 0
SUM OF ALL RESPONSES TO PHQ QUESTIONS 1-9: 0
10. IF YOU CHECKED OFF ANY PROBLEMS, HOW DIFFICULT HAVE THESE PROBLEMS MADE IT FOR YOU TO DO YOUR WORK, TAKE CARE OF THINGS AT HOME, OR GET ALONG WITH OTHER PEOPLE: NOT DIFFICULT AT ALL
3. TROUBLE FALLING OR STAYING ASLEEP: NOT AT ALL
4. FEELING TIRED OR HAVING LITTLE ENERGY: NOT AT ALL
8. MOVING OR SPEAKING SO SLOWLY THAT OTHER PEOPLE COULD HAVE NOTICED. OR THE OPPOSITE, BEING SO FIGETY OR RESTLESS THAT YOU HAVE BEEN MOVING AROUND A LOT MORE THAN USUAL: NOT AT ALL

## 2024-11-19 ASSESSMENT — ENCOUNTER SYMPTOMS
DIARRHEA: 1
CONSTIPATION: 0
ABDOMINAL PAIN: 0
SHORTNESS OF BREATH: 0

## 2024-11-19 NOTE — PROGRESS NOTES
Rona Carole Goldberg is a 84 y.o. year old female who presents today for   Chief Complaint   Patient presents with    Hypertension    Diabetes    3 Month Follow-Up        \"Have you been to the ER, urgent care clinic since your last visit?  Hospitalized since your last visit?\"   NO     “Have you seen or consulted any other health care providers outside our system since your last visit?”   NO       “Have you had a diabetic eye exam?”    NO     Date of last diabetic eye exam: 1/25/2021           - Guero Smith/CHRISTINA Olmos  [facility]  Phone: [phone]  Fax: [phone]  
Polycarbophil (FIBER) 625 MG TABS Take 1 tablet by mouth daily 90 tablet 3    amLODIPine (NORVASC) 5 MG tablet Take 1 tablet by mouth daily 90 tablet 2    atenolol (TENORMIN) 50 MG tablet Take 1 tablet by mouth daily 90 tablet 2    levothyroxine (SYNTHROID) 50 MCG tablet TAKE 1 TABLET EVERY MORNING BEFORE BREAKFAST 90 tablet 3    metFORMIN (GLUCOPHAGE) 500 MG tablet Take 2 tablets by mouth 2 times daily (with meals) 180 tablet 1    dapagliflozin (FARXIGA) 10 MG tablet Take 1 tablet by mouth daily 90 tablet 2    pantoprazole (PROTONIX) 40 MG tablet Take 1 tablet by mouth daily 30 tablet 4    Ospemifene (OSPHENA) 60 MG TABS Take 1 tablet by mouth daily 90 tablet 1    losartan (COZAAR) 100 MG tablet Take 1 tablet by mouth daily 90 tablet 1    simvastatin (ZOCOR) 10 MG tablet Take 1 tablet by mouth nightly 90 tablet 1    linaclotide (LINZESS) 145 MCG capsule 1 capsule      gabapentin (NEURONTIN) 100 MG capsule as needed.      Carboxymethylcellulose Sodium (REFRESH PLUS OP) Apply to eye      diclofenac sodium (VOLTAREN) 1 % GEL Apply 4 g topically 4 times daily (Patient taking differently: Apply 4 g topically 4 times daily As needed) 100 g 2    TRUE METRIX BLOOD GLUCOSE TEST strip CHECK BLOOD SUGAR EVERY  strip 2    oxybutynin (DITROPAN) 5 MG tablet Take 1 tablet by mouth daily 90 tablet 3    alendronate (FOSAMAX) 70 MG tablet TAKE 1 TABLET BY MOUTH EVERY SEVEN (7) DAYS. 12 tablet 1    acetaminophen (TYLENOL) 500 MG tablet Take 2 tablets by mouth every 6 hours as needed      ascorbic acid (VITAMIN C) 500 MG tablet Take 1 tablet by mouth daily      Calcium Carbonate-Vitamin D 600-5 MG-MCG TABS Take 2 tablets by mouth 2 times daily      cycloSPORINE (RESTASIS) 0.05 % ophthalmic emulsion ceived the following from [facility]: Outside name: Restasis 0.05 % dpet      folic acid (FOLVITE) 800 MCG tablet Take 1 tablet by mouth daily      L-Methylfolate-Algae-B12-B6 (METANX PO) Take 2 mg by mouth daily

## 2024-11-25 ENCOUNTER — TELEPHONE (OUTPATIENT)
Facility: CLINIC | Age: 84
End: 2024-11-25

## 2024-11-25 NOTE — TELEPHONE ENCOUNTER
Pt is calling in to request a new referral for PT. The previous referral to [facility] did not work out for the pt. Also, requesting a c/b from clinical staff    LOV: 11.19.24

## 2024-12-12 ENCOUNTER — TELEPHONE (OUTPATIENT)
Facility: CLINIC | Age: 84
End: 2024-12-12

## 2024-12-12 NOTE — TELEPHONE ENCOUNTER
Pt called in to request a c/b from PCP. Pt read on the medication details that if she has a hx of hormone related cancer or breast cancer, this medication should not be used.

## 2025-01-03 ENCOUNTER — TELEPHONE (OUTPATIENT)
Facility: CLINIC | Age: 85
End: 2025-01-03

## 2025-01-03 NOTE — TELEPHONE ENCOUNTER
Patient called in would  like to get a referral to       Gynecologist and podiatrist       Please advise      Thank you

## 2025-01-07 ENCOUNTER — TELEPHONE (OUTPATIENT)
Facility: CLINIC | Age: 85
End: 2025-01-07

## 2025-01-07 RX ORDER — LOSARTAN POTASSIUM 100 MG/1
100 TABLET ORAL DAILY
Qty: 90 TABLET | Refills: 1 | Status: SHIPPED | OUTPATIENT
Start: 2025-01-07

## 2025-01-07 RX ORDER — OSPEMIFENE 60 MG/1
1 TABLET, FILM COATED ORAL DAILY
Qty: 90 TABLET | Refills: 1 | Status: SHIPPED | OUTPATIENT
Start: 2025-01-07

## 2025-01-07 NOTE — TELEPHONE ENCOUNTER
Med Refill Request    Medication(s) requesting:   Requested Prescriptions        Pending Prescriptions Disp Refills   losartan (COZAAR) 100 MG tablet  90 tabs 1       Last OV: 11/19/2024  Next Appt: 02/25/2025

## 2025-01-07 NOTE — TELEPHONE ENCOUNTER
Medication(s) requesting:   Requested Prescriptions     Pending Prescriptions Disp Refills    losartan (COZAAR) 100 MG tablet 90 tablet 1     Sig: Take 1 tablet by mouth daily       Last office visit:  11/19/2024  Next office visit DMA: 2/25/2025

## 2025-01-10 ENCOUNTER — TELEPHONE (OUTPATIENT)
Facility: CLINIC | Age: 85
End: 2025-01-10

## 2025-01-10 NOTE — TELEPHONE ENCOUNTER
[facility] is calling to advise that the coverage does not cover the following:      OSPHENA     The alternative would be ESTRADO    Ref# [id number]    Callback:  [phone] Clarise      Please advise    Thank you

## 2025-01-14 RX ORDER — SIMVASTATIN 10 MG
10 TABLET ORAL NIGHTLY
Qty: 90 TABLET | Refills: 1 | Status: SHIPPED | OUTPATIENT
Start: 2025-01-14

## 2025-01-14 NOTE — TELEPHONE ENCOUNTER
[facility] [place] - [employer] approved today . Used info from Insurance card in chart.

## 2025-01-14 NOTE — TELEPHONE ENCOUNTER
Medication(s) requesting:   Requested Prescriptions     Pending Prescriptions Disp Refills    simvastatin (ZOCOR) 10 MG tablet 90 tablet 1     Sig: Take 1 tablet by mouth nightly       Last office visit:  11/19/24  Next office visit DMA: 2/25/2025

## 2025-01-14 NOTE — TELEPHONE ENCOUNTER
Patient called in requesting medication refill on the following:     Simvastatin (ZOCOR) 10 MG tablet     LOV: 11/19/24   NOV: 2/25/25     Please be advised, thank you.

## 2025-01-16 ENCOUNTER — OFFICE VISIT (OUTPATIENT)
Age: 85
End: 2025-01-16

## 2025-01-16 DIAGNOSIS — M17.12 PRIMARY OSTEOARTHRITIS OF LEFT KNEE: Primary | ICD-10-CM

## 2025-01-16 RX ORDER — BETAMETHASONE SODIUM PHOSPHATE AND BETAMETHASONE ACETATE 3; 3 MG/ML; MG/ML
6 INJECTION, SUSPENSION INTRA-ARTICULAR; INTRALESIONAL; INTRAMUSCULAR; SOFT TISSUE ONCE
Status: COMPLETED | OUTPATIENT
Start: 2025-01-16 | End: 2025-01-16

## 2025-01-16 RX ADMIN — BETAMETHASONE SODIUM PHOSPHATE AND BETAMETHASONE ACETATE 6 MG: 3; 3 INJECTION, SUSPENSION INTRA-ARTICULAR; INTRALESIONAL; INTRAMUSCULAR; SOFT TISSUE at 10:46

## 2025-01-16 NOTE — PROGRESS NOTES
Codeine Itching     Other reaction(s): itching    Penicillins Hives and Swelling    Meperidine Itching    Other Itching     codine    Penicillin G Hives       Past Surgical History:   Procedure Laterality Date    APPENDECTOMY      BREAST LUMPECTOMY  2002    RIGHT BREAST + 3 lymph nodes     CHOLECYSTECTOMY  2013    COLONOSCOPY      HYSTERECTOMY (CERVIX STATUS UNKNOWN)  2010    PRECANCEROUS CELLS ON RIGHT OVARY    ORTHOPEDIC SURGERY  2018    Right thumb repair     SHOULDER ARTHROSCOPY  2013    TONSILLECTOMY  AGE 15    1953    UPPER GASTROINTESTINAL ENDOSCOPY N/A 12/29/2023    EGD ESOPHAGOGASTRODUODENOSCOPY w/ esophageal savary dilation, w/ cold forcep bx performed by Gabe Alicia MD at [facility] ENDOSCOPY       Social History     Socioeconomic History    Marital status: Single     Spouse name: Not on file    Number of children: Not on file    Years of education: Not on file    Highest education level: Not on file   Occupational History    Not on file   Tobacco Use    Smoking status: Never    Smokeless tobacco: Never   Vaping Use    Vaping status: Never Used   Substance and Sexual Activity    Alcohol use: No    Drug use: No    Sexual activity: Defer   Other Topics Concern    Not on file   Social History Narrative    Not on file     Social Determinants of Health     Financial Resource Strain: Low Risk  (11/19/2024)    Overall Financial Resource Strain (CARDIA)     Difficulty of Paying Living Expenses: Not hard at all   Food Insecurity: No Food Insecurity (11/19/2024)    Hunger Vital Sign     Worried About Running Out of Food in the Last Year: Never true     Ran Out of Food in the Last Year: Never true   Transportation Needs: Unknown (11/19/2024)    PRAPARE - Transportation     Lack of Transportation (Medical): Not on file     Lack of Transportation (Non-Medical): No   Physical Activity: Insufficiently Active (8/19/2024)    Exercise Vital Sign     Days of Exercise per Week: 1 day     Minutes of Exercise per Session: 10

## 2025-01-23 ENCOUNTER — TELEPHONE (OUTPATIENT)
Facility: CLINIC | Age: 85
End: 2025-01-23

## 2025-01-23 NOTE — TELEPHONE ENCOUNTER
Pt called in advising her insurance [employer] is requesting a letter from her PCP to re-attest to her chronic condition    Letter can be sent to:    [address] [id number]  [place] [address]    Phone # [phone] or [phone]

## 2025-04-08 ENCOUNTER — OFFICE VISIT (OUTPATIENT)
Facility: CLINIC | Age: 85
End: 2025-04-08
Payer: MEDICARE

## 2025-04-08 VITALS
BODY MASS INDEX: 23.98 KG/M2 | DIASTOLIC BLOOD PRESSURE: 75 MMHG | HEIGHT: 61 IN | WEIGHT: 127 LBS | OXYGEN SATURATION: 98 % | HEART RATE: 68 BPM | TEMPERATURE: 98 F | SYSTOLIC BLOOD PRESSURE: 154 MMHG | RESPIRATION RATE: 16 BRPM

## 2025-04-08 DIAGNOSIS — F43.0 STRESS REACTION: ICD-10-CM

## 2025-04-08 DIAGNOSIS — R26.89 POOR BALANCE: ICD-10-CM

## 2025-04-08 DIAGNOSIS — N39.46 MIXED STRESS AND URGE URINARY INCONTINENCE: ICD-10-CM

## 2025-04-08 DIAGNOSIS — N89.8 VAGINAL DRYNESS: ICD-10-CM

## 2025-04-08 DIAGNOSIS — Z00.00 MEDICARE ANNUAL WELLNESS VISIT, SUBSEQUENT: Primary | ICD-10-CM

## 2025-04-08 DIAGNOSIS — F43.10 POST TRAUMATIC STRESS DISORDER: ICD-10-CM

## 2025-04-08 DIAGNOSIS — E11.42 TYPE 2 DIABETES MELLITUS WITH DIABETIC POLYNEUROPATHY, WITHOUT LONG-TERM CURRENT USE OF INSULIN (HCC): ICD-10-CM

## 2025-04-08 DIAGNOSIS — M62.89 PELVIC FLOOR DYSFUNCTION: ICD-10-CM

## 2025-04-08 DIAGNOSIS — F41.9 ANXIETY AND DEPRESSION: ICD-10-CM

## 2025-04-08 DIAGNOSIS — I10 PRIMARY HYPERTENSION: ICD-10-CM

## 2025-04-08 DIAGNOSIS — F32.A ANXIETY AND DEPRESSION: ICD-10-CM

## 2025-04-08 LAB — HBA1C MFR BLD: 6.7 %

## 2025-04-08 PROCEDURE — 3044F HG A1C LEVEL LT 7.0%: CPT | Performed by: STUDENT IN AN ORGANIZED HEALTH CARE EDUCATION/TRAINING PROGRAM

## 2025-04-08 PROCEDURE — 1123F ACP DISCUSS/DSCN MKR DOCD: CPT | Performed by: STUDENT IN AN ORGANIZED HEALTH CARE EDUCATION/TRAINING PROGRAM

## 2025-04-08 PROCEDURE — G0439 PPPS, SUBSEQ VISIT: HCPCS | Performed by: STUDENT IN AN ORGANIZED HEALTH CARE EDUCATION/TRAINING PROGRAM

## 2025-04-08 PROCEDURE — 3077F SYST BP >= 140 MM HG: CPT | Performed by: STUDENT IN AN ORGANIZED HEALTH CARE EDUCATION/TRAINING PROGRAM

## 2025-04-08 PROCEDURE — 99214 OFFICE O/P EST MOD 30 MIN: CPT | Performed by: STUDENT IN AN ORGANIZED HEALTH CARE EDUCATION/TRAINING PROGRAM

## 2025-04-08 PROCEDURE — 1126F AMNT PAIN NOTED NONE PRSNT: CPT | Performed by: STUDENT IN AN ORGANIZED HEALTH CARE EDUCATION/TRAINING PROGRAM

## 2025-04-08 PROCEDURE — 3078F DIAST BP <80 MM HG: CPT | Performed by: STUDENT IN AN ORGANIZED HEALTH CARE EDUCATION/TRAINING PROGRAM

## 2025-04-08 PROCEDURE — 1159F MED LIST DOCD IN RCRD: CPT | Performed by: STUDENT IN AN ORGANIZED HEALTH CARE EDUCATION/TRAINING PROGRAM

## 2025-04-08 PROCEDURE — 83036 HEMOGLOBIN GLYCOSYLATED A1C: CPT | Performed by: STUDENT IN AN ORGANIZED HEALTH CARE EDUCATION/TRAINING PROGRAM

## 2025-04-08 SDOH — ECONOMIC STABILITY: FOOD INSECURITY: WITHIN THE PAST 12 MONTHS, THE FOOD YOU BOUGHT JUST DIDN'T LAST AND YOU DIDN'T HAVE MONEY TO GET MORE.: NEVER TRUE

## 2025-04-08 SDOH — ECONOMIC STABILITY: FOOD INSECURITY: WITHIN THE PAST 12 MONTHS, YOU WORRIED THAT YOUR FOOD WOULD RUN OUT BEFORE YOU GOT MONEY TO BUY MORE.: NEVER TRUE

## 2025-04-08 ASSESSMENT — PATIENT HEALTH QUESTIONNAIRE - PHQ9
7. TROUBLE CONCENTRATING ON THINGS, SUCH AS READING THE NEWSPAPER OR WATCHING TELEVISION: NOT AT ALL
6. FEELING BAD ABOUT YOURSELF - OR THAT YOU ARE A FAILURE OR HAVE LET YOURSELF OR YOUR FAMILY DOWN: NOT AT ALL
5. POOR APPETITE OR OVEREATING: NOT AT ALL
4. FEELING TIRED OR HAVING LITTLE ENERGY: NOT AT ALL
SUM OF ALL RESPONSES TO PHQ QUESTIONS 1-9: 0
10. IF YOU CHECKED OFF ANY PROBLEMS, HOW DIFFICULT HAVE THESE PROBLEMS MADE IT FOR YOU TO DO YOUR WORK, TAKE CARE OF THINGS AT HOME, OR GET ALONG WITH OTHER PEOPLE: NOT DIFFICULT AT ALL
2. FEELING DOWN, DEPRESSED OR HOPELESS: NOT AT ALL
1. LITTLE INTEREST OR PLEASURE IN DOING THINGS: NOT AT ALL
9. THOUGHTS THAT YOU WOULD BE BETTER OFF DEAD, OR OF HURTING YOURSELF: NOT AT ALL
3. TROUBLE FALLING OR STAYING ASLEEP: NOT AT ALL
8. MOVING OR SPEAKING SO SLOWLY THAT OTHER PEOPLE COULD HAVE NOTICED. OR THE OPPOSITE, BEING SO FIGETY OR RESTLESS THAT YOU HAVE BEEN MOVING AROUND A LOT MORE THAN USUAL: NOT AT ALL
SUM OF ALL RESPONSES TO PHQ QUESTIONS 1-9: 0

## 2025-04-08 ASSESSMENT — ENCOUNTER SYMPTOMS
ABDOMINAL PAIN: 0
SHORTNESS OF BREATH: 0

## 2025-04-08 NOTE — PROGRESS NOTES
Rona Carole Goldberg is a 84 y.o. year old female who presents today for   Chief Complaint   Patient presents with    Medicare AWV    Hypertension    Diabetes       \"Have you been to the ER, urgent care clinic since your last visit?  Hospitalized since your last visit?\"    No     “Have you seen or consulted any other health care providers outside our system since your last visit?”    No       “Have you had a diabetic eye exam?”    NO     Date of last diabetic eye exam: 1/25/2021         Click Here for Release of Records Request  
tablet by mouth 2 times daily as needed. Yes ProviderFemi MD   methylphenidate (RITALIN) 20 MG tablet Take 1 tablet by mouth 2 times daily. Yes Provider, MD Femi   ketoconazole (NIZORAL) 2 % cream Apply topically daily Apply topically as needed Yes Provider, MD Femi   valACYclovir (VALTREX) 1 g tablet Take 1 tablet by mouth 2 times daily As needed Yes ProviderFemi MD   OLANZapine (ZYPREXA) 5 MG tablet Take 1 tablet by mouth nightly Yes Provider, MD Femi       CareTeam (Including outside providers/suppliers regularly involved in providing care):   Patient Care Team:  Elida Corona MD as PCP - General (Family Medicine)  [facility]  Garcia Garsia MD as Consulting Physician  Gabe Alicia MD as Consulting Physician  Get Ortiz MD as Consulting Physician  Carlos Rondon MD as Consulting Physician     Recommendations for Preventive Services Due: see orders and patient instructions/AVS.  Recommended screening schedule for the next 5-10 years is provided to the patient in written form: see Patient Instructions/AVS.     Reviewed and updated this visit:  Tobacco  Allergies  Meds       Sexual History: Patient declined to answer.

## 2025-04-08 NOTE — PATIENT INSTRUCTIONS
there is a form of activity that will work for you. And the more physical activity you can do, the better your overall health will be.  What kinds of activity can help you stay healthy?  Being more active will make your daily activities easier. Physical activity includes planned exercise and things you do in daily life. There are four types of activity:  Aerobic.  Doing aerobic activity makes your heart and lungs strong.  Includes walking, dancing, and gardening.  Aim for at least 2½ hours spread throughout the week.  It improves your energy and can help you sleep better.  Muscle-strengthening.  This type of activity can help maintain muscle and strengthen bones.  Includes climbing stairs, using resistance bands, and lifting or carrying heavy loads.  Aim for at least twice a week.  It can help protect the knees and other joints.  Stretching.  Stretching gives you better range of motion in joints and muscles.  Includes upper arm stretches, calf stretches, and gentle yoga.  Aim for at least twice a week, preferably after your muscles are warmed up from other activities.  It can help you function better in daily life.  Balancing.  This helps you stay coordinated and have good posture.  Includes heel-to-toe walking, ramesh chi, and certain types of yoga.  Aim for at least 3 days a week.  It can reduce your risk of falling.  Even if you have a hard time meeting the recommendations, it's better to be more active than less active. All activity done in each category counts toward your weekly total. You'd be surprised how daily things like carrying groceries, keeping up with grandchildren, and taking the stairs can add up.  What keeps you from being active?  If you've had a hard time being more active, you're not alone. Maybe you remember being able to do more. Or maybe you've never thought of yourself as being active. It's frustrating when you can't do the things you want. Being more active can help. What's holding you

## 2025-04-25 ENCOUNTER — LAB (OUTPATIENT)
Facility: CLINIC | Age: 85
End: 2025-04-25

## 2025-04-25 ENCOUNTER — HOSPITAL ENCOUNTER (OUTPATIENT)
Facility: HOSPITAL | Age: 85
Setting detail: SPECIMEN
Discharge: HOME | End: 2025-04-28

## 2025-04-25 DIAGNOSIS — I10 PRIMARY HYPERTENSION: ICD-10-CM

## 2025-04-25 DIAGNOSIS — E11.42 TYPE 2 DIABETES MELLITUS WITH DIABETIC POLYNEUROPATHY, WITHOUT LONG-TERM CURRENT USE OF INSULIN (HCC): ICD-10-CM

## 2025-04-25 LAB
BASOPHILS # BLD: 1 % (ref 0–2)
BASOPHILS ABSOLUTE: 0 K/UL (ref 0–0.2)
EOSINOPHIL # BLD: 3 % (ref 0–6)
EOSINOPHILS ABSOLUTE: 0.2 K/UL (ref 0–0.5)
HCT VFR BLD CALC: 36.4 % (ref 35.1–48.3)
HEMOGLOBIN: 11.7 G/DL (ref 11.7–16.1)
LYMPHOCYTES # BLD: 26 % (ref 20–45)
LYMPHOCYTES ABSOLUTE: 1.4 K/UL (ref 1–4.8)
MCH RBC QN AUTO: 28 PG (ref 26–34)
MCHC RBC AUTO-ENTMCNC: 32 G/DL (ref 31–36)
MCV RBC AUTO: 88 FL (ref 80–99)
MONOCYTES ABSOLUTE: 0.4 K/UL (ref 0.1–1)
MONOCYTES: 8 % (ref 3–12)
NEUTROPHILS ABSOLUTE: 3.2 K/UL (ref 1.8–7.7)
NEUTROPHILS SEGMENTED: 62 % (ref 40–75)
PDW BLD-RTO: 14.6 % (ref 10–15.5)
PLATELET # BLD: 185 K/UL (ref 140–440)
PMV BLD AUTO: 11.3 FL (ref 9–13)
RBC # BLD: 4.12 M/UL (ref 3.8–5.2)
WBC # BLD: 5.2 K/UL (ref 4–11)

## 2025-04-25 PROCEDURE — 99001 SPECIMEN HANDLING PT-LAB: CPT

## 2025-04-26 LAB
A/G RATIO: 2.1 RATIO (ref 1.1–2.6)
ALBUMIN: 4.6 G/DL (ref 3.5–5)
ALP BLD-CCNC: 78 U/L (ref 40–120)
ALT SERPL-CCNC: 11 U/L (ref 5–40)
ANION GAP SERPL CALCULATED.3IONS-SCNC: 12 MMOL/L (ref 3–15)
AST SERPL-CCNC: 16 U/L (ref 10–37)
BILIRUB SERPL-MCNC: 0.3 MG/DL (ref 0.2–1.2)
BUN BLDV-MCNC: 19 MG/DL (ref 6–22)
CALCIUM SERPL-MCNC: 9.2 MG/DL (ref 8.4–10.5)
CHLORIDE BLD-SCNC: 102 MMOL/L (ref 98–110)
CHOLESTEROL, TOTAL: 147 MG/DL (ref 110–200)
CHOLESTEROL/HDL RATIO: 1.9 (ref 0–5)
CO2: 28 MMOL/L (ref 20–32)
CREAT SERPL-MCNC: 1 MG/DL (ref 0.8–1.4)
CREATININE, URINE  MG/DL: 74 MG/DL
GFR, ESTIMATED: 53.4 ML/MIN/1.73 SQ.M.
GLOBULIN: 2.2 G/DL (ref 2–4)
GLUCOSE: 151 MG/DL (ref 70–99)
HDLC SERPL-MCNC: 76 MG/DL
LDL CHOLESTEROL: 50 MG/DL (ref 50–99)
LDL/HDL RATIO: 0.7
MICROALBUMIN/CREAT 24H UR: 13 MG/L (ref 0.1–17)
MICROALBUMIN/CREAT UR-RTO: 17.6 (ref 0–30)
NON-HDL CHOLESTEROL: 71 MG/DL
POTASSIUM SERPL-SCNC: 4.6 MMOL/L (ref 3.5–5.5)
SODIUM BLD-SCNC: 142 MMOL/L (ref 133–145)
TOTAL PROTEIN: 6.8 G/DL (ref 6.2–8.1)
TRIGL SERPL-MCNC: 102 MG/DL (ref 40–149)
VLDLC SERPL CALC-MCNC: 20 MG/DL (ref 8–30)

## 2025-04-29 ENCOUNTER — RESULTS FOLLOW-UP (OUTPATIENT)
Facility: CLINIC | Age: 85
End: 2025-04-29

## 2025-05-01 NOTE — TELEPHONE ENCOUNTER
Med Refill Request  Medication(s) requesting:   Requested Prescriptions        Pending Prescriptions Disp Refills   metFORMIN (GLUCOPHAGE) 500 MG tablet  180 tabs 2       Last OV: 04/08/25  Next Appt: 08/07/25

## 2025-05-01 NOTE — TELEPHONE ENCOUNTER
Medication(s) requesting:   Requested Prescriptions     Pending Prescriptions Disp Refills    metFORMIN (GLUCOPHAGE) 500 MG tablet 180 tablet 2     Sig: Take 2 tablets by mouth 2 times daily (with meals)       Last office visit:  04/08/25  Next office visit DMA: 8/7/2025

## 2025-05-03 LAB — SENTARA SPECIMEN COLLECTION: NORMAL

## 2025-05-14 ENCOUNTER — TELEPHONE (OUTPATIENT)
Facility: CLINIC | Age: 85
End: 2025-05-14

## 2025-05-14 RX ORDER — MECOBAL/LEVOMEFOLAT CA/B6 PHOS 2-3-35 MG
1 CAPSULE ORAL 2 TIMES DAILY
Qty: 180 CAPSULE | Refills: 2 | Status: SHIPPED | OUTPATIENT
Start: 2025-05-14

## 2025-05-14 NOTE — TELEPHONE ENCOUNTER
The patient called stating she needs an Rx for Metanx-fc 35mg caps; 1 cap by mouth 2x a day. She states her previous prescribed is no longer working in the practice.    Pt may need an appt but requested a message first

## 2025-05-22 NOTE — TELEPHONE ENCOUNTER
Medication(s) requesting:   Requested Prescriptions     Pending Prescriptions Disp Refills    pantoprazole (PROTONIX) 40 MG tablet 30 tablet 4     Sig: Take 1 tablet by mouth daily       Last office visit:  04/08/2025  Next office visit DMA: 8/7/2025

## 2025-05-25 RX ORDER — PANTOPRAZOLE SODIUM 40 MG/1
40 TABLET, DELAYED RELEASE ORAL DAILY
Qty: 30 TABLET | Refills: 5 | Status: SHIPPED | OUTPATIENT
Start: 2025-05-25

## 2025-05-29 ENCOUNTER — COMPREHENSIVE EXAM (OUTPATIENT)
Age: 85
End: 2025-05-29

## 2025-05-29 DIAGNOSIS — H04.123: ICD-10-CM

## 2025-05-29 DIAGNOSIS — H16.143: ICD-10-CM

## 2025-05-29 DIAGNOSIS — E11.9: ICD-10-CM

## 2025-05-29 DIAGNOSIS — Z96.1: ICD-10-CM

## 2025-05-29 DIAGNOSIS — H40.023: ICD-10-CM

## 2025-05-29 PROCEDURE — 92133 CPTRZD OPH DX IMG PST SGM ON: CPT

## 2025-05-29 PROCEDURE — 92015 DETERMINE REFRACTIVE STATE: CPT

## 2025-05-29 PROCEDURE — 99214 OFFICE O/P EST MOD 30 MIN: CPT

## 2025-06-16 RX ORDER — DAPAGLIFLOZIN 10 MG/1
10 TABLET, FILM COATED ORAL DAILY
Qty: 90 TABLET | Refills: 2 | Status: SHIPPED | OUTPATIENT
Start: 2025-06-16

## 2025-06-16 RX ORDER — LOSARTAN POTASSIUM 100 MG/1
100 TABLET ORAL DAILY
Qty: 90 TABLET | Refills: 2 | Status: SHIPPED | OUTPATIENT
Start: 2025-06-16

## 2025-06-16 RX ORDER — ATENOLOL 50 MG/1
50 TABLET ORAL DAILY
Qty: 90 TABLET | Refills: 2 | Status: SHIPPED | OUTPATIENT
Start: 2025-06-16

## 2025-06-16 RX ORDER — OSPEMIFENE 60 MG/1
1 TABLET, FILM COATED ORAL DAILY
Qty: 90 TABLET | Refills: 2 | Status: SHIPPED | OUTPATIENT
Start: 2025-06-16

## 2025-06-16 RX ORDER — SIMVASTATIN 10 MG
10 TABLET ORAL NIGHTLY
Qty: 90 TABLET | Refills: 2 | Status: SHIPPED | OUTPATIENT
Start: 2025-06-16

## 2025-06-16 NOTE — TELEPHONE ENCOUNTER
Medication(s) requesting:   Requested Prescriptions     Pending Prescriptions Disp Refills    losartan (COZAAR) 100 MG tablet [Pharmacy Med Name: LOSARTAN TABS 100MG] 90 tablet 3     Sig: TAKE 1 TABLET DAILY    atenolol (TENORMIN) 50 MG tablet [Pharmacy Med Name: ATENOLOL TABS 50MG] 90 tablet 3     Sig: TAKE 1 TABLET DAILY    FARXIGA 10 MG tablet [Pharmacy Med Name: FARXIGA TABS 10MG] 90 tablet 3     Sig: TAKE 1 TABLET DAILY    simvastatin (ZOCOR) 10 MG tablet 90 tablet 1     Sig: Take 1 tablet by mouth nightly    Ospemifene (OSPHENA) 60 MG TABS 90 tablet 1     Sig: Take 1 tablet by mouth daily       Last office visit:  04/08/25  Next office visit DMA: 8/7/2025

## 2025-06-16 NOTE — TELEPHONE ENCOUNTER
Pt called to also request the following med refills:    Ospemifene (OSPHENA) 60 MG TABS [[id number]]     Simvastatin (ZOCOR) 10 MG tablet [[id number]]     LOV:  4/8/2025  NOV:  8/7/2025    Please assist. Thank you.

## 2025-06-17 ENCOUNTER — TELEPHONE (OUTPATIENT)
Facility: CLINIC | Age: 85
End: 2025-06-17

## 2025-06-17 NOTE — TELEPHONE ENCOUNTER
Pt contacted the [facility] regarding a referral. Reached out to the pt to get further details, lvm for a c/b

## 2025-06-20 ENCOUNTER — OFFICE VISIT (OUTPATIENT)
Facility: CLINIC | Age: 85
End: 2025-06-20
Payer: MEDICARE

## 2025-06-20 VITALS
HEIGHT: 61 IN | SYSTOLIC BLOOD PRESSURE: 133 MMHG | BODY MASS INDEX: 24.32 KG/M2 | RESPIRATION RATE: 15 BRPM | TEMPERATURE: 97.3 F | WEIGHT: 128.8 LBS | HEART RATE: 69 BPM | DIASTOLIC BLOOD PRESSURE: 76 MMHG | OXYGEN SATURATION: 97 %

## 2025-06-20 DIAGNOSIS — R94.4 DECREASED GFR: ICD-10-CM

## 2025-06-20 DIAGNOSIS — N89.8 VAGINAL IRRITATION: Primary | ICD-10-CM

## 2025-06-20 DIAGNOSIS — I10 PRIMARY HYPERTENSION: ICD-10-CM

## 2025-06-20 DIAGNOSIS — E11.42 TYPE 2 DIABETES MELLITUS WITH DIABETIC POLYNEUROPATHY, WITHOUT LONG-TERM CURRENT USE OF INSULIN (HCC): ICD-10-CM

## 2025-06-20 PROCEDURE — 1159F MED LIST DOCD IN RCRD: CPT | Performed by: STUDENT IN AN ORGANIZED HEALTH CARE EDUCATION/TRAINING PROGRAM

## 2025-06-20 PROCEDURE — 1123F ACP DISCUSS/DSCN MKR DOCD: CPT | Performed by: STUDENT IN AN ORGANIZED HEALTH CARE EDUCATION/TRAINING PROGRAM

## 2025-06-20 PROCEDURE — 3044F HG A1C LEVEL LT 7.0%: CPT | Performed by: STUDENT IN AN ORGANIZED HEALTH CARE EDUCATION/TRAINING PROGRAM

## 2025-06-20 PROCEDURE — 1126F AMNT PAIN NOTED NONE PRSNT: CPT | Performed by: STUDENT IN AN ORGANIZED HEALTH CARE EDUCATION/TRAINING PROGRAM

## 2025-06-20 PROCEDURE — 3078F DIAST BP <80 MM HG: CPT | Performed by: STUDENT IN AN ORGANIZED HEALTH CARE EDUCATION/TRAINING PROGRAM

## 2025-06-20 PROCEDURE — 99214 OFFICE O/P EST MOD 30 MIN: CPT | Performed by: STUDENT IN AN ORGANIZED HEALTH CARE EDUCATION/TRAINING PROGRAM

## 2025-06-20 PROCEDURE — 3075F SYST BP GE 130 - 139MM HG: CPT | Performed by: STUDENT IN AN ORGANIZED HEALTH CARE EDUCATION/TRAINING PROGRAM

## 2025-06-20 RX ORDER — TRIAMCINOLONE ACETONIDE 0.25 MG/G
OINTMENT TOPICAL
Qty: 15 G | Refills: 0 | Status: SHIPPED | OUTPATIENT
Start: 2025-06-20 | End: 2025-06-27

## 2025-06-20 SDOH — ECONOMIC STABILITY: FOOD INSECURITY: WITHIN THE PAST 12 MONTHS, YOU WORRIED THAT YOUR FOOD WOULD RUN OUT BEFORE YOU GOT MONEY TO BUY MORE.: NEVER TRUE

## 2025-06-20 SDOH — ECONOMIC STABILITY: FOOD INSECURITY: WITHIN THE PAST 12 MONTHS, THE FOOD YOU BOUGHT JUST DIDN'T LAST AND YOU DIDN'T HAVE MONEY TO GET MORE.: NEVER TRUE

## 2025-06-20 ASSESSMENT — PATIENT HEALTH QUESTIONNAIRE - PHQ9
7. TROUBLE CONCENTRATING ON THINGS, SUCH AS READING THE NEWSPAPER OR WATCHING TELEVISION: NOT AT ALL
10. IF YOU CHECKED OFF ANY PROBLEMS, HOW DIFFICULT HAVE THESE PROBLEMS MADE IT FOR YOU TO DO YOUR WORK, TAKE CARE OF THINGS AT HOME, OR GET ALONG WITH OTHER PEOPLE: NOT DIFFICULT AT ALL
4. FEELING TIRED OR HAVING LITTLE ENERGY: NOT AT ALL
SUM OF ALL RESPONSES TO PHQ QUESTIONS 1-9: 0
9. THOUGHTS THAT YOU WOULD BE BETTER OFF DEAD, OR OF HURTING YOURSELF: NOT AT ALL
SUM OF ALL RESPONSES TO PHQ QUESTIONS 1-9: 0
8. MOVING OR SPEAKING SO SLOWLY THAT OTHER PEOPLE COULD HAVE NOTICED. OR THE OPPOSITE, BEING SO FIGETY OR RESTLESS THAT YOU HAVE BEEN MOVING AROUND A LOT MORE THAN USUAL: NOT AT ALL
SUM OF ALL RESPONSES TO PHQ QUESTIONS 1-9: 0
2. FEELING DOWN, DEPRESSED OR HOPELESS: NOT AT ALL
5. POOR APPETITE OR OVEREATING: NOT AT ALL
1. LITTLE INTEREST OR PLEASURE IN DOING THINGS: NOT AT ALL
SUM OF ALL RESPONSES TO PHQ QUESTIONS 1-9: 0
6. FEELING BAD ABOUT YOURSELF - OR THAT YOU ARE A FAILURE OR HAVE LET YOURSELF OR YOUR FAMILY DOWN: NOT AT ALL
3. TROUBLE FALLING OR STAYING ASLEEP: NOT AT ALL

## 2025-06-20 ASSESSMENT — ENCOUNTER SYMPTOMS
SHORTNESS OF BREATH: 0
ABDOMINAL PAIN: 0

## 2025-06-20 NOTE — PROGRESS NOTES
History of Present Illness  Rona Carole Goldberg is a 84 y.o. female who presents today for management of    Chief Complaint   Patient presents with    Vaginal Pain         - Patient requested appointment for evaluation of vaginal pain  - States that there is an area with mild discomfort that she can point towards, states that her roommate looked at the area and he saw a bump.  Patient unable to feel a bump today.  Denies irritation today.      Patient Active Problem List   Diagnosis    Type 2 diabetes mellitus with diabetic polyneuropathy, without long-term current use of insulin (HCC)    Hypercholesterolemia    Acquired hypothyroidism    Grade I diastolic dysfunction    Anxiety and depression    Senile osteoporosis    Hypertension    Abnormal computerized axial tomography of abdomen    Change in bowel habit    Chest pain    Erosive esophagitis    Erosive gastritis    Gastroesophageal reflux disease    Nausea    Recurrent UTI      Past Medical History:   Diagnosis Date    Acid reflux     Breast cancer ([facility]) 2002    Stage 1, right    Depression     Diabetes (HCC)     Hypercholesterolemia     Hypertension     Hypothyroidism     Lyme disease     Neuropathy     OAB (overactive bladder)     Osteoporosis     Post-menopausal     Sepsis ([facility]) 2009      Past Surgical History:   Procedure Laterality Date    APPENDECTOMY      BREAST LUMPECTOMY  2002    RIGHT BREAST + 3 lymph nodes     CHOLECYSTECTOMY  2013    COLONOSCOPY      HYSTERECTOMY (CERVIX STATUS UNKNOWN)  2010    PRECANCEROUS CELLS ON RIGHT OVARY    ORTHOPEDIC SURGERY  2018    Right thumb repair     SHOULDER ARTHROSCOPY  2013    TONSILLECTOMY  AGE 15    1953    UPPER GASTROINTESTINAL ENDOSCOPY N/A 12/29/2023    EGD ESOPHAGOGASTRODUODENOSCOPY w/ esophageal savary dilation, w/ cold forcep bx performed by Gabe Alicia MD at [facility] ENDOSCOPY      Family History   Problem Relation Age of Onset    Heart Disease Mother     Heart Disease Brother     Heart Disease Father

## 2025-06-20 NOTE — PROGRESS NOTES
Rona Carole Goldberg is a 84 y.o. year old female who presents today for   Chief Complaint   Patient presents with    Vaginal Pain        \"Have you been to the ER, urgent care clinic since your last visit?  Hospitalized since your last visit?\"   NO     “Have you seen or consulted any other health care providers outside our system since your last visit?”   NO             - Guero Smith/CHRISTINA Olmos  [facility]  Phone: [phone]  Fax: [phone]

## 2025-06-23 RX ORDER — SIMVASTATIN 10 MG
10 TABLET ORAL NIGHTLY
Qty: 90 TABLET | Refills: 3 | Status: SHIPPED | OUTPATIENT
Start: 2025-06-23

## 2025-06-23 RX ORDER — OSPEMIFENE 60 MG/1
1 TABLET, FILM COATED ORAL DAILY
Qty: 90 TABLET | Refills: 3 | Status: SHIPPED | OUTPATIENT
Start: 2025-06-23

## 2025-07-02 ENCOUNTER — TELEPHONE (OUTPATIENT)
Facility: CLINIC | Age: 85
End: 2025-07-02

## 2025-07-02 DIAGNOSIS — R19.5 LOOSE STOOLS: ICD-10-CM

## 2025-07-02 RX ORDER — CALCIUM POLYCARBOPHIL 625 MG
625 TABLET ORAL DAILY
Qty: 90 TABLET | Refills: 3 | Status: CANCELLED | OUTPATIENT
Start: 2025-07-02

## 2025-07-02 NOTE — TELEPHONE ENCOUNTER
Medication(s) requesting:   Requested Prescriptions     Pending Prescriptions Disp Refills    Calcium Polycarbophil (FIBER) 625 MG TABS 90 tablet 3     Sig: Take 1 tablet by mouth daily       Last office visit:  06/20/25  Next office visit DMA: 8/7/2025

## 2025-07-02 NOTE — TELEPHONE ENCOUNTER
Pharmacy faxed request for alt med due to Fiber Tabs 625 MG being out of stock. Possible alt, Fiberlax Tab 625 MG

## 2025-07-28 NOTE — PROGRESS NOTES
Please advise patient that bone density scan showed osteoporosis of the left wrist and normal on both hips. Continue fosamax. Encourage patient to take calcium and vitamin D daily. Soft tissue ultrasound di not reveal any abnormality. I believe the palpable mass is a collection of fatty tissue. No intervention needed at this time. Working BR  report : Patient declined every service I offered.

## 2025-08-07 ENCOUNTER — OFFICE VISIT (OUTPATIENT)
Facility: CLINIC | Age: 85
End: 2025-08-07
Payer: MEDICARE

## 2025-08-07 VITALS
OXYGEN SATURATION: 98 % | SYSTOLIC BLOOD PRESSURE: 139 MMHG | RESPIRATION RATE: 16 BRPM | BODY MASS INDEX: 23.6 KG/M2 | TEMPERATURE: 97.3 F | HEART RATE: 70 BPM | WEIGHT: 125 LBS | HEIGHT: 61 IN | DIASTOLIC BLOOD PRESSURE: 78 MMHG

## 2025-08-07 DIAGNOSIS — R68.2 DRY MOUTH: ICD-10-CM

## 2025-08-07 DIAGNOSIS — H04.123 DRY EYES: ICD-10-CM

## 2025-08-07 DIAGNOSIS — E11.42 TYPE 2 DIABETES MELLITUS WITH DIABETIC POLYNEUROPATHY, WITHOUT LONG-TERM CURRENT USE OF INSULIN (HCC): Primary | ICD-10-CM

## 2025-08-07 DIAGNOSIS — R94.4 DECREASED GFR: ICD-10-CM

## 2025-08-07 DIAGNOSIS — I10 PRIMARY HYPERTENSION: ICD-10-CM

## 2025-08-07 PROCEDURE — 3044F HG A1C LEVEL LT 7.0%: CPT | Performed by: STUDENT IN AN ORGANIZED HEALTH CARE EDUCATION/TRAINING PROGRAM

## 2025-08-07 PROCEDURE — 1159F MED LIST DOCD IN RCRD: CPT | Performed by: STUDENT IN AN ORGANIZED HEALTH CARE EDUCATION/TRAINING PROGRAM

## 2025-08-07 PROCEDURE — 1126F AMNT PAIN NOTED NONE PRSNT: CPT | Performed by: STUDENT IN AN ORGANIZED HEALTH CARE EDUCATION/TRAINING PROGRAM

## 2025-08-07 PROCEDURE — 3078F DIAST BP <80 MM HG: CPT | Performed by: STUDENT IN AN ORGANIZED HEALTH CARE EDUCATION/TRAINING PROGRAM

## 2025-08-07 PROCEDURE — 3075F SYST BP GE 130 - 139MM HG: CPT | Performed by: STUDENT IN AN ORGANIZED HEALTH CARE EDUCATION/TRAINING PROGRAM

## 2025-08-07 PROCEDURE — 99214 OFFICE O/P EST MOD 30 MIN: CPT | Performed by: STUDENT IN AN ORGANIZED HEALTH CARE EDUCATION/TRAINING PROGRAM

## 2025-08-07 PROCEDURE — 1123F ACP DISCUSS/DSCN MKR DOCD: CPT | Performed by: STUDENT IN AN ORGANIZED HEALTH CARE EDUCATION/TRAINING PROGRAM

## 2025-08-07 SDOH — ECONOMIC STABILITY: FOOD INSECURITY: WITHIN THE PAST 12 MONTHS, YOU WORRIED THAT YOUR FOOD WOULD RUN OUT BEFORE YOU GOT MONEY TO BUY MORE.: NEVER TRUE

## 2025-08-07 SDOH — ECONOMIC STABILITY: FOOD INSECURITY: WITHIN THE PAST 12 MONTHS, THE FOOD YOU BOUGHT JUST DIDN'T LAST AND YOU DIDN'T HAVE MONEY TO GET MORE.: NEVER TRUE

## 2025-08-07 ASSESSMENT — PATIENT HEALTH QUESTIONNAIRE - PHQ9
3. TROUBLE FALLING OR STAYING ASLEEP: NOT AT ALL
2. FEELING DOWN, DEPRESSED OR HOPELESS: NOT AT ALL
SUM OF ALL RESPONSES TO PHQ QUESTIONS 1-9: 0
9. THOUGHTS THAT YOU WOULD BE BETTER OFF DEAD, OR OF HURTING YOURSELF: NOT AT ALL
8. MOVING OR SPEAKING SO SLOWLY THAT OTHER PEOPLE COULD HAVE NOTICED. OR THE OPPOSITE, BEING SO FIGETY OR RESTLESS THAT YOU HAVE BEEN MOVING AROUND A LOT MORE THAN USUAL: NOT AT ALL
5. POOR APPETITE OR OVEREATING: NOT AT ALL
10. IF YOU CHECKED OFF ANY PROBLEMS, HOW DIFFICULT HAVE THESE PROBLEMS MADE IT FOR YOU TO DO YOUR WORK, TAKE CARE OF THINGS AT HOME, OR GET ALONG WITH OTHER PEOPLE: NOT DIFFICULT AT ALL
7. TROUBLE CONCENTRATING ON THINGS, SUCH AS READING THE NEWSPAPER OR WATCHING TELEVISION: NOT AT ALL
4. FEELING TIRED OR HAVING LITTLE ENERGY: NOT AT ALL
SUM OF ALL RESPONSES TO PHQ QUESTIONS 1-9: 0
6. FEELING BAD ABOUT YOURSELF - OR THAT YOU ARE A FAILURE OR HAVE LET YOURSELF OR YOUR FAMILY DOWN: NOT AT ALL
1. LITTLE INTEREST OR PLEASURE IN DOING THINGS: NOT AT ALL

## 2025-08-07 ASSESSMENT — ENCOUNTER SYMPTOMS
SHORTNESS OF BREATH: 0
ABDOMINAL PAIN: 0

## 2025-08-08 LAB
A/G RATIO: 1.9 RATIO (ref 1.1–2.6)
ALBUMIN: 4.5 G/DL (ref 3.5–5)
ALP BLD-CCNC: 79 U/L (ref 40–120)
ALT SERPL-CCNC: 11 U/L (ref 5–40)
ANION GAP SERPL CALCULATED.3IONS-SCNC: 16 MMOL/L (ref 3–15)
ANTI-DNA (DS) AB QN: 1 IU/ML
AST SERPL-CCNC: 16 U/L (ref 10–37)
BILIRUB SERPL-MCNC: 0.2 MG/DL (ref 0.2–1.2)
BUN BLDV-MCNC: 17 MG/DL (ref 6–22)
CALCIUM SERPL-MCNC: 9.5 MG/DL (ref 8.4–10.5)
CENTROMERE B AB: <0.2 AI
CHLORIDE BLD-SCNC: 100 MMOL/L (ref 98–110)
CHROMATIN ANTIBODY: <0.2 AI
CO2: 23 MMOL/L (ref 20–32)
CREAT SERPL-MCNC: 1.2 MG/DL (ref 0.8–1.4)
ESTIMATED AVERAGE GLUCOSE: 147 MG/DL (ref 91–123)
GFR, ESTIMATED: 43.5 ML/MIN/1.73 SQ.M.
GLOBULIN: 2.4 G/DL (ref 2–4)
GLUCOSE: 167 MG/DL (ref 70–99)
HBA1C MFR BLD: 6.8 % (ref 4.8–5.6)
JO-1 ANTIBODY: <0.2 AI
POTASSIUM SERPL-SCNC: 4.7 MMOL/L (ref 3.5–5.5)
RNP ANTIBODY: <0.2 AI
SCLERODERMA (SCL-70) AB: <0.2 AI
SJOGREN'S ANTIBODIES (SSA): 0.6 AI
SJOGREN'S ANTIBODIES (SSA): 0.6 AI
SJOGREN'S ANTIBODIES (SSB): <0.2 AI
SJOGREN'S ANTIBODIES (SSB): <0.2 AI
SMITH ABS: <0.2 AI
SODIUM BLD-SCNC: 139 MMOL/L (ref 133–145)
TOTAL PROTEIN: 6.9 G/DL (ref 6.2–8.1)

## 2025-08-14 ENCOUNTER — TELEPHONE (OUTPATIENT)
Facility: CLINIC | Age: 85
End: 2025-08-14

## 2025-08-19 RX ORDER — PANTOPRAZOLE SODIUM 40 MG/1
40 TABLET, DELAYED RELEASE ORAL DAILY
Qty: 30 TABLET | Refills: 0 | Status: SHIPPED | OUTPATIENT
Start: 2025-08-19

## 2025-08-21 ENCOUNTER — OFFICE VISIT (OUTPATIENT)
Age: 85
End: 2025-08-21
Payer: MEDICARE

## 2025-08-21 VITALS
WEIGHT: 126 LBS | OXYGEN SATURATION: 96 % | DIASTOLIC BLOOD PRESSURE: 66 MMHG | HEART RATE: 73 BPM | SYSTOLIC BLOOD PRESSURE: 124 MMHG | HEIGHT: 61 IN | BODY MASS INDEX: 23.79 KG/M2

## 2025-08-21 DIAGNOSIS — I10 HYPERTENSION, UNSPECIFIED TYPE: ICD-10-CM

## 2025-08-21 DIAGNOSIS — I10 ESSENTIAL HYPERTENSION WITH GOAL BLOOD PRESSURE LESS THAN 140/90: Primary | ICD-10-CM

## 2025-08-21 DIAGNOSIS — I34.0 MITRAL VALVE INSUFFICIENCY, UNSPECIFIED ETIOLOGY: ICD-10-CM

## 2025-08-21 DIAGNOSIS — R06.02 SHORTNESS OF BREATH: ICD-10-CM

## 2025-08-21 DIAGNOSIS — R07.9 CHEST PAIN, UNSPECIFIED TYPE: ICD-10-CM

## 2025-08-21 PROCEDURE — 3078F DIAST BP <80 MM HG: CPT | Performed by: PHYSICIAN ASSISTANT

## 2025-08-21 PROCEDURE — 99214 OFFICE O/P EST MOD 30 MIN: CPT | Performed by: PHYSICIAN ASSISTANT

## 2025-08-21 PROCEDURE — 3074F SYST BP LT 130 MM HG: CPT | Performed by: PHYSICIAN ASSISTANT

## 2025-08-21 PROCEDURE — 1123F ACP DISCUSS/DSCN MKR DOCD: CPT | Performed by: PHYSICIAN ASSISTANT

## 2025-08-21 PROCEDURE — 1126F AMNT PAIN NOTED NONE PRSNT: CPT | Performed by: PHYSICIAN ASSISTANT

## 2025-08-21 PROCEDURE — 93000 ELECTROCARDIOGRAM COMPLETE: CPT | Performed by: PHYSICIAN ASSISTANT

## 2025-08-21 RX ORDER — ASPIRIN 81 MG/1
81 TABLET ORAL DAILY
Qty: 90 TABLET | Refills: 2 | Status: SHIPPED | OUTPATIENT
Start: 2025-08-21